# Patient Record
Sex: MALE | Race: WHITE | NOT HISPANIC OR LATINO | Employment: UNEMPLOYED | URBAN - METROPOLITAN AREA
[De-identification: names, ages, dates, MRNs, and addresses within clinical notes are randomized per-mention and may not be internally consistent; named-entity substitution may affect disease eponyms.]

---

## 2020-08-24 ENCOUNTER — OFFICE VISIT (OUTPATIENT)
Dept: INTERNAL MEDICINE | Facility: CLINIC | Age: 60
End: 2020-08-24
Payer: COMMERCIAL

## 2020-08-24 ENCOUNTER — HOSPITAL ENCOUNTER (OUTPATIENT)
Dept: CARDIOLOGY | Facility: HOSPITAL | Age: 60
Discharge: HOME | End: 2020-08-24
Attending: FAMILY MEDICINE
Payer: COMMERCIAL

## 2020-08-24 ENCOUNTER — HOSPITAL ENCOUNTER (OUTPATIENT)
Dept: RADIOLOGY | Facility: HOSPITAL | Age: 60
Discharge: HOME | End: 2020-08-24
Attending: FAMILY MEDICINE
Payer: COMMERCIAL

## 2020-08-24 VITALS
DIASTOLIC BLOOD PRESSURE: 86 MMHG | WEIGHT: 211.63 LBS | TEMPERATURE: 98 F | HEART RATE: 75 BPM | SYSTOLIC BLOOD PRESSURE: 132 MMHG | HEIGHT: 72 IN | RESPIRATION RATE: 18 BRPM | BODY MASS INDEX: 28.66 KG/M2 | OXYGEN SATURATION: 98 %

## 2020-08-24 DIAGNOSIS — I10 ESSENTIAL HYPERTENSION: ICD-10-CM

## 2020-08-24 DIAGNOSIS — F41.1 GAD (GENERALIZED ANXIETY DISORDER): Primary | ICD-10-CM

## 2020-08-24 DIAGNOSIS — Z00.00 ROUTINE GENERAL MEDICAL EXAMINATION AT HEALTH CARE FACILITY: ICD-10-CM

## 2020-08-24 DIAGNOSIS — Z11.4 ENCOUNTER FOR SCREENING FOR HIV: ICD-10-CM

## 2020-08-24 DIAGNOSIS — M79.89 SWELLING OF FINGER OF BOTH HANDS: ICD-10-CM

## 2020-08-24 PROCEDURE — 99204 PR OFFICE/OUTPT VISIT, NEW, LEVL IV, 45-59 MIN: ICD-10-PCS | Mod: S$GLB,,, | Performed by: FAMILY MEDICINE

## 2020-08-24 PROCEDURE — 93005 ELECTROCARDIOGRAM TRACING: CPT | Mod: PO

## 2020-08-24 PROCEDURE — 3008F PR BODY MASS INDEX (BMI) DOCUMENTED: ICD-10-PCS | Mod: CPTII,S$GLB,, | Performed by: FAMILY MEDICINE

## 2020-08-24 PROCEDURE — 71046 X-RAY EXAM CHEST 2 VIEWS: CPT | Mod: TC,PO

## 2020-08-24 PROCEDURE — 99999 PR PBB SHADOW E&M-NEW PATIENT-LVL IV: ICD-10-PCS | Mod: PBBFAC,,, | Performed by: FAMILY MEDICINE

## 2020-08-24 PROCEDURE — 71046 X-RAY EXAM CHEST 2 VIEWS: CPT | Mod: 26,,, | Performed by: RADIOLOGY

## 2020-08-24 PROCEDURE — 93010 EKG 12-LEAD: ICD-10-PCS | Mod: ,,, | Performed by: INTERNAL MEDICINE

## 2020-08-24 PROCEDURE — 3008F BODY MASS INDEX DOCD: CPT | Mod: CPTII,S$GLB,, | Performed by: FAMILY MEDICINE

## 2020-08-24 PROCEDURE — 93010 ELECTROCARDIOGRAM REPORT: CPT | Mod: ,,, | Performed by: INTERNAL MEDICINE

## 2020-08-24 PROCEDURE — 71046 XR CHEST PA AND LATERAL: ICD-10-PCS | Mod: 26,,, | Performed by: RADIOLOGY

## 2020-08-24 PROCEDURE — 99204 OFFICE O/P NEW MOD 45 MIN: CPT | Mod: S$GLB,,, | Performed by: FAMILY MEDICINE

## 2020-08-24 PROCEDURE — 99999 PR PBB SHADOW E&M-NEW PATIENT-LVL IV: CPT | Mod: PBBFAC,,, | Performed by: FAMILY MEDICINE

## 2020-08-24 RX ORDER — ASPIRIN 81 MG/1
81 TABLET ORAL DAILY
COMMUNITY

## 2020-08-24 RX ORDER — SERTRALINE HYDROCHLORIDE 25 MG/1
25 TABLET, FILM COATED ORAL DAILY
Qty: 30 TABLET | Refills: 6 | Status: SHIPPED | OUTPATIENT
Start: 2020-08-24 | End: 2020-09-22

## 2020-08-24 RX ORDER — LISINOPRIL 10 MG/1
10 TABLET ORAL DAILY
COMMUNITY
End: 2021-06-11 | Stop reason: SDUPTHER

## 2020-08-24 NOTE — PROGRESS NOTES
Patient ID: Aguilar Rowe is a 60 y.o. male.    Chief Complaint: Panic attacks, Hypertention, and Fingers swelling    HPI Patient is a 60-year-old male who presents with complaints of anxiety and panic attack that 1st began a couple months ago.  Says most attack was last week when he began having trouble breathing with chest tightness.  Also had excess sweating.  Was at home when this occurred. No identifiable triggers. Rode around town and felt better.  Has had 2 other episodes prior to this.  Does report having more responsibility at work as he was promoted and oversees other people.  Works at local [employer]. This does cause him a lot of stress and anxiety.  Has been taking Benadryl to help with sleep and symptoms.    Takes lisinopril for hypertension.  Does report that at work earlier today blood pressure was somewhat elevated.    Also reports b/l finger swelling that began about a month ago.  No joint pain or leg swelling. No erythema.  Does work outside in the heat.  He denies PND and orthopnea.  No chest pain. Denies tobacco use. No prior cardiac problems. Drinks 3-5 beers after work each day.    Does report that he has dyslexia and has trouble reading. Besides carpal tunnel denies any other medical problems.    Family History   Problem Relation Age of Onset    No Known Problems Mother     No Known Problems Father        Current Outpatient Medications:     aspirin (ECOTRIN) 81 MG EC tablet, Take 81 mg by mouth once daily., Disp: , Rfl:     lisinopriL 10 MG tablet, Take 10 mg by mouth once daily., Disp: , Rfl:     sertraline (ZOLOFT) 25 MG tablet, Take 1 tablet (25 mg total) by mouth once daily., Disp: 30 tablet, Rfl: 6    Review of Systems   Constitutional: Negative for chills and fever.   HENT: Negative for congestion and sore throat.    Eyes: Negative for visual disturbance.   Respiratory: Negative for cough and wheezing.    Cardiovascular: Negative for leg swelling.   Gastrointestinal: Negative  for abdominal pain, diarrhea and nausea.   Endocrine: Negative for polydipsia and polyuria.   Musculoskeletal: Positive for joint swelling. Negative for arthralgias.   Skin: Negative for rash.   Neurological: Negative for dizziness and headaches.   Psychiatric/Behavioral: Negative for dysphoric mood and sleep disturbance. The patient is nervous/anxious.        Objective:   /86 (BP Location: Left arm, Patient Position: Sitting, BP Method: Large (Manual))   Pulse 75   Temp 97.9 °F (36.6 °C) (Temporal)   Resp 18   Ht 6' (1.829 m)   Wt 96 kg (211 lb 10.3 oz)   SpO2 98%   BMI 28.70 kg/m²      Physical Exam  Constitutional:       Appearance: He is well-developed.   HENT:      Head: Normocephalic and atraumatic.   Eyes:      Conjunctiva/sclera: Conjunctivae normal.   Neck:      Musculoskeletal: Normal range of motion.   Cardiovascular:      Rate and Rhythm: Normal rate and regular rhythm.      Heart sounds: Normal heart sounds.   Pulmonary:      Effort: Pulmonary effort is normal.      Breath sounds: Normal breath sounds.   Abdominal:      Palpations: Abdomen is soft.   Musculoskeletal: Normal range of motion.      Comments: Mild bilateral finger swelling. Finger amputation. No other joint deformities   Skin:     General: Skin is warm.   Neurological:      Mental Status: He is alert and oriented to person, place, and time.   Psychiatric:         Mood and Affect: Mood normal.         Behavior: Behavior normal.         Thought Content: Thought content normal.         Judgment: Judgment normal.             Assessment & Plan     Problem List Items Addressed This Visit        Psychiatric    AYSHA (generalized anxiety disorder) - Primary    Relevant Medications    sertraline (ZOLOFT) 25 MG tablet    Other Relevant Orders    TSH (Completed)       Cardiac/Vascular    Essential hypertension    Current Assessment & Plan     -continue lisinopril         Relevant Orders    CBC auto differential (Completed)    Comprehensive  metabolic panel (Completed)    Hemoglobin A1C    Lipid Panel    X-Ray Chest PA And Lateral    EKG 12-lead       Other    Swelling of finger of both hands    Current Assessment & Plan     -No erythema or joint pain associated with finger swelling. Could be due to excess heat exposure at work.   -f/u labs           Other Visit Diagnoses     Encounter for screening for HIV        Routine general medical examination at health care facility        Relevant Orders    CBC auto differential (Completed)    Comprehensive metabolic panel (Completed)    Hemoglobin A1C    Lipid Panel    EKG 12-lead    Hepatitis C Antibody    HIV 1/2 Ag/Ab (4th Gen)    Zoster Recombinant Vaccine    Tdap Vaccine            Follow up in about 4 weeks (around 9/21/2020) for anxiety f/u.      Disclaimer:  This note may have been prepared using voice recognition software, without extensive proofreading. As such, there could be errors within the text such as sound alike errors.

## 2020-08-24 NOTE — ASSESSMENT & PLAN NOTE
-No erythema or joint pain associated with finger swelling. Could be due to excess heat exposure at work.   -f/u labs

## 2020-09-22 ENCOUNTER — OFFICE VISIT (OUTPATIENT)
Dept: INTERNAL MEDICINE | Facility: CLINIC | Age: 60
End: 2020-09-22
Payer: COMMERCIAL

## 2020-09-22 VITALS
HEIGHT: 72 IN | BODY MASS INDEX: 28.66 KG/M2 | TEMPERATURE: 98 F | RESPIRATION RATE: 20 BRPM | DIASTOLIC BLOOD PRESSURE: 84 MMHG | WEIGHT: 211.63 LBS | OXYGEN SATURATION: 98 % | SYSTOLIC BLOOD PRESSURE: 128 MMHG | HEART RATE: 73 BPM

## 2020-09-22 DIAGNOSIS — F10.29 ALCOHOL DEPENDENCE WITH UNSPECIFIED ALCOHOL-INDUCED DISORDER: ICD-10-CM

## 2020-09-22 DIAGNOSIS — I10 ESSENTIAL HYPERTENSION: ICD-10-CM

## 2020-09-22 DIAGNOSIS — F41.1 GAD (GENERALIZED ANXIETY DISORDER): Primary | ICD-10-CM

## 2020-09-22 PROBLEM — F10.20 ALCOHOL DEPENDENCE: Status: ACTIVE | Noted: 2020-09-22

## 2020-09-22 PROCEDURE — 3008F PR BODY MASS INDEX (BMI) DOCUMENTED: ICD-10-PCS | Mod: CPTII,S$GLB,, | Performed by: FAMILY MEDICINE

## 2020-09-22 PROCEDURE — 99214 OFFICE O/P EST MOD 30 MIN: CPT | Mod: 25,S$GLB,, | Performed by: FAMILY MEDICINE

## 2020-09-22 PROCEDURE — 90471 IMMUNIZATION ADMIN: CPT | Mod: S$GLB,,, | Performed by: FAMILY MEDICINE

## 2020-09-22 PROCEDURE — 99999 PR PBB SHADOW E&M-EST. PATIENT-LVL III: CPT | Mod: PBBFAC,,, | Performed by: FAMILY MEDICINE

## 2020-09-22 PROCEDURE — 99214 PR OFFICE/OUTPT VISIT, EST, LEVL IV, 30-39 MIN: ICD-10-PCS | Mod: 25,S$GLB,, | Performed by: FAMILY MEDICINE

## 2020-09-22 PROCEDURE — 99999 PR PBB SHADOW E&M-EST. PATIENT-LVL III: ICD-10-PCS | Mod: PBBFAC,,, | Performed by: FAMILY MEDICINE

## 2020-09-22 PROCEDURE — 90471 FLU VACCINE (QUAD) GREATER THAN OR EQUAL TO 3YO PRESERVATIVE FREE IM: ICD-10-PCS | Mod: S$GLB,,, | Performed by: FAMILY MEDICINE

## 2020-09-22 PROCEDURE — 3008F BODY MASS INDEX DOCD: CPT | Mod: CPTII,S$GLB,, | Performed by: FAMILY MEDICINE

## 2020-09-22 PROCEDURE — 90686 FLU VACCINE (QUAD) GREATER THAN OR EQUAL TO 3YO PRESERVATIVE FREE IM: ICD-10-PCS | Mod: S$GLB,,, | Performed by: FAMILY MEDICINE

## 2020-09-22 PROCEDURE — 90686 IIV4 VACC NO PRSV 0.5 ML IM: CPT | Mod: S$GLB,,, | Performed by: FAMILY MEDICINE

## 2020-09-22 NOTE — ASSESSMENT & PLAN NOTE
-we discussed most recent labs including elevated T bilirubin at 1.2.  Will continue to monitor.    -He has decreased beer intake from 18 daily to 12. I congratulated him. Offered resources to assist however he declined

## 2020-09-22 NOTE — ASSESSMENT & PLAN NOTE
-discussed prescribing new medication for anxiety.  Would like to hold off for now.  Says anxiety is better controlled since decreasing alcohol intake.  Encouraged his efforts and told him that resources/medications are available if needed

## 2020-09-22 NOTE — PROGRESS NOTES
" Patient ID: Aguilar Rowe is a 60 y.o. male.    Chief Complaint: Follow-up anxiety (patient d/c zoloft made him feel strange)    HPI Patient is 60-year-old male who presents for anxiety follow-up.  Zoloft 25 mg started at last visit.  Says he took medication for 1 week then discontinued because it made him feel "strange".  Says anxiety has been better controlled since last visit. No panic attacks. Takes benadryl at night for sleep.    Has decreased alcohol intake (18 beers--> 12 beers per day).  Plans to continue decrease.  Says alcohol helps him relax at end of day.  Says hand/finger swelling resolved after decreasing alcohol intake.    Last c-scope 12/2017.  Says scope was normal and was told to repeat in 10 years.    Family History   Problem Relation Age of Onset    No Known Problems Mother     No Known Problems Father        Current Outpatient Medications:     aspirin (ECOTRIN) 81 MG EC tablet, Take 81 mg by mouth once daily., Disp: , Rfl:     lisinopriL 10 MG tablet, Take 10 mg by mouth once daily., Disp: , Rfl:     Review of Systems   Constitutional: Negative for chills and fever.   HENT: Negative for congestion and sore throat.    Eyes: Negative for visual disturbance.   Respiratory: Negative for cough, shortness of breath and wheezing.    Cardiovascular: Negative for chest pain.   Gastrointestinal: Negative for abdominal pain, diarrhea and nausea.   Endocrine: Negative for polydipsia and polyuria.   Musculoskeletal: Negative for arthralgias.   Skin: Negative for rash.   Neurological: Negative for dizziness and headaches.   Psychiatric/Behavioral: The patient is not nervous/anxious.        Objective:   /84 (BP Location: Left arm, Patient Position: Sitting, BP Method: Large (Manual))   Pulse 73   Temp 97.9 °F (36.6 °C) (Temporal)   Resp 20   Ht 6' (1.829 m)   Wt 96 kg (211 lb 10.3 oz)   SpO2 98%   BMI 28.70 kg/m²      Physical Exam  Constitutional:       Appearance: He is well-developed. "   HENT:      Head: Normocephalic and atraumatic.   Eyes:      Conjunctiva/sclera: Conjunctivae normal.   Neck:      Musculoskeletal: Normal range of motion.   Cardiovascular:      Rate and Rhythm: Normal rate and regular rhythm.      Heart sounds: Normal heart sounds.   Pulmonary:      Effort: Pulmonary effort is normal.      Breath sounds: Normal breath sounds.   Abdominal:      Palpations: Abdomen is soft.      Tenderness: There is no abdominal tenderness.   Musculoskeletal: Normal range of motion.      Right lower leg: No edema.      Left lower leg: No edema.   Skin:     General: Skin is warm.   Neurological:      Mental Status: He is alert and oriented to person, place, and time.   Psychiatric:         Mood and Affect: Mood normal.         Behavior: Behavior normal.         Thought Content: Thought content normal.         Judgment: Judgment normal.         Assessment & Plan     Problem List Items Addressed This Visit        Psychiatric    AYSHA (generalized anxiety disorder) - Primary    Current Assessment & Plan     -discussed prescribing new medication for anxiety.  Would like to hold off for now.  Says anxiety is better controlled since decreasing alcohol intake.  Encouraged his efforts and told him that resources/medications are available if needed         Alcohol dependence    Current Assessment & Plan     -we discussed most recent labs including elevated T bilirubin at 1.2.  Will continue to monitor.    -He has decreased beer intake from 18 daily to 12. I congratulated him. Offered resources to assist however he declined            Cardiac/Vascular    Essential hypertension    Current Assessment & Plan     -controlled.  Continue lisinopril.             Discussed lab results. Declined shingles and tetanus vaccines. Flu shot today    Disclaimer:  This note may have been prepared using voice recognition software, without extensive proofreading. As such, there could be errors within the text such as sound alike  errors.

## 2021-02-22 ENCOUNTER — OFFICE VISIT (OUTPATIENT)
Dept: ORTHOPEDICS | Facility: CLINIC | Age: 61
End: 2021-02-22
Payer: COMMERCIAL

## 2021-02-22 ENCOUNTER — OFFICE VISIT (OUTPATIENT)
Dept: INTERNAL MEDICINE | Facility: CLINIC | Age: 61
End: 2021-02-22
Payer: COMMERCIAL

## 2021-02-22 VITALS — HEIGHT: 72 IN | BODY MASS INDEX: 29.12 KG/M2 | WEIGHT: 215 LBS

## 2021-02-22 VITALS
BODY MASS INDEX: 29.21 KG/M2 | SYSTOLIC BLOOD PRESSURE: 130 MMHG | DIASTOLIC BLOOD PRESSURE: 70 MMHG | OXYGEN SATURATION: 98 % | HEIGHT: 72 IN | WEIGHT: 215.63 LBS | TEMPERATURE: 98 F | HEART RATE: 72 BPM

## 2021-02-22 DIAGNOSIS — M65.341 TRIGGER FINGER, RIGHT RING FINGER: ICD-10-CM

## 2021-02-22 DIAGNOSIS — M79.641 PAIN OF RIGHT HAND: Primary | ICD-10-CM

## 2021-02-22 DIAGNOSIS — I10 ESSENTIAL HYPERTENSION: ICD-10-CM

## 2021-02-22 DIAGNOSIS — Z12.11 SPECIAL SCREENING FOR MALIGNANT NEOPLASM OF COLON: Primary | ICD-10-CM

## 2021-02-22 DIAGNOSIS — F41.1 GAD (GENERALIZED ANXIETY DISORDER): ICD-10-CM

## 2021-02-22 DIAGNOSIS — G56.01 RIGHT CARPAL TUNNEL SYNDROME: ICD-10-CM

## 2021-02-22 DIAGNOSIS — G56.01 CARPAL TUNNEL SYNDROME OF RIGHT WRIST: Primary | ICD-10-CM

## 2021-02-22 DIAGNOSIS — M79.641 PAIN OF RIGHT HAND: ICD-10-CM

## 2021-02-22 PROCEDURE — 1125F AMNT PAIN NOTED PAIN PRSNT: CPT | Mod: S$GLB,,, | Performed by: STUDENT IN AN ORGANIZED HEALTH CARE EDUCATION/TRAINING PROGRAM

## 2021-02-22 PROCEDURE — 99999 PR PBB SHADOW E&M-EST. PATIENT-LVL III: CPT | Mod: PBBFAC,,, | Performed by: STUDENT IN AN ORGANIZED HEALTH CARE EDUCATION/TRAINING PROGRAM

## 2021-02-22 PROCEDURE — 3008F BODY MASS INDEX DOCD: CPT | Mod: CPTII,S$GLB,, | Performed by: STUDENT IN AN ORGANIZED HEALTH CARE EDUCATION/TRAINING PROGRAM

## 2021-02-22 PROCEDURE — 1125F PR PAIN SEVERITY QUANTIFIED, PAIN PRESENT: ICD-10-PCS | Mod: S$GLB,,, | Performed by: NURSE PRACTITIONER

## 2021-02-22 PROCEDURE — 3008F PR BODY MASS INDEX (BMI) DOCUMENTED: ICD-10-PCS | Mod: CPTII,S$GLB,, | Performed by: NURSE PRACTITIONER

## 2021-02-22 PROCEDURE — 99214 OFFICE O/P EST MOD 30 MIN: CPT | Mod: S$GLB,,, | Performed by: NURSE PRACTITIONER

## 2021-02-22 PROCEDURE — 3078F DIAST BP <80 MM HG: CPT | Mod: CPTII,S$GLB,, | Performed by: NURSE PRACTITIONER

## 2021-02-22 PROCEDURE — 99203 OFFICE O/P NEW LOW 30 MIN: CPT | Mod: S$GLB,,, | Performed by: STUDENT IN AN ORGANIZED HEALTH CARE EDUCATION/TRAINING PROGRAM

## 2021-02-22 PROCEDURE — 3008F BODY MASS INDEX DOCD: CPT | Mod: CPTII,S$GLB,, | Performed by: NURSE PRACTITIONER

## 2021-02-22 PROCEDURE — 99999 PR PBB SHADOW E&M-EST. PATIENT-LVL III: ICD-10-PCS | Mod: PBBFAC,,, | Performed by: STUDENT IN AN ORGANIZED HEALTH CARE EDUCATION/TRAINING PROGRAM

## 2021-02-22 PROCEDURE — 99999 PR PBB SHADOW E&M-EST. PATIENT-LVL IV: ICD-10-PCS | Mod: PBBFAC,,, | Performed by: NURSE PRACTITIONER

## 2021-02-22 PROCEDURE — 3075F SYST BP GE 130 - 139MM HG: CPT | Mod: CPTII,S$GLB,, | Performed by: NURSE PRACTITIONER

## 2021-02-22 PROCEDURE — 3078F PR MOST RECENT DIASTOLIC BLOOD PRESSURE < 80 MM HG: ICD-10-PCS | Mod: CPTII,S$GLB,, | Performed by: NURSE PRACTITIONER

## 2021-02-22 PROCEDURE — 99999 PR PBB SHADOW E&M-EST. PATIENT-LVL IV: CPT | Mod: PBBFAC,,, | Performed by: NURSE PRACTITIONER

## 2021-02-22 PROCEDURE — 99214 PR OFFICE/OUTPT VISIT, EST, LEVL IV, 30-39 MIN: ICD-10-PCS | Mod: S$GLB,,, | Performed by: NURSE PRACTITIONER

## 2021-02-22 PROCEDURE — 1125F PR PAIN SEVERITY QUANTIFIED, PAIN PRESENT: ICD-10-PCS | Mod: S$GLB,,, | Performed by: STUDENT IN AN ORGANIZED HEALTH CARE EDUCATION/TRAINING PROGRAM

## 2021-02-22 PROCEDURE — 3075F PR MOST RECENT SYSTOLIC BLOOD PRESS GE 130-139MM HG: ICD-10-PCS | Mod: CPTII,S$GLB,, | Performed by: NURSE PRACTITIONER

## 2021-02-22 PROCEDURE — 1125F AMNT PAIN NOTED PAIN PRSNT: CPT | Mod: S$GLB,,, | Performed by: NURSE PRACTITIONER

## 2021-02-22 PROCEDURE — 99203 PR OFFICE/OUTPT VISIT, NEW, LEVL III, 30-44 MIN: ICD-10-PCS | Mod: S$GLB,,, | Performed by: STUDENT IN AN ORGANIZED HEALTH CARE EDUCATION/TRAINING PROGRAM

## 2021-02-22 PROCEDURE — 3008F PR BODY MASS INDEX (BMI) DOCUMENTED: ICD-10-PCS | Mod: CPTII,S$GLB,, | Performed by: STUDENT IN AN ORGANIZED HEALTH CARE EDUCATION/TRAINING PROGRAM

## 2021-02-22 RX ORDER — HYDROXYZINE HYDROCHLORIDE 25 MG/1
25 TABLET, FILM COATED ORAL 3 TIMES DAILY PRN
Qty: 30 TABLET | Refills: 5 | Status: SHIPPED | OUTPATIENT
Start: 2021-02-22 | End: 2021-06-11

## 2021-02-22 RX ORDER — ESCITALOPRAM OXALATE 10 MG/1
5 TABLET ORAL DAILY
Qty: 30 TABLET | Refills: 0 | Status: SHIPPED | OUTPATIENT
Start: 2021-02-22 | End: 2021-06-11

## 2021-02-22 RX ORDER — MELOXICAM 7.5 MG/1
7.5 TABLET ORAL DAILY
Qty: 30 TABLET | Refills: 1 | Status: SHIPPED | OUTPATIENT
Start: 2021-02-22 | End: 2021-06-11

## 2021-03-22 ENCOUNTER — HOSPITAL ENCOUNTER (OUTPATIENT)
Dept: RADIOLOGY | Facility: HOSPITAL | Age: 61
Discharge: HOME | End: 2021-03-22
Attending: NURSE PRACTITIONER
Payer: COMMERCIAL

## 2021-03-22 ENCOUNTER — OFFICE VISIT (OUTPATIENT)
Dept: ORTHOPEDICS | Facility: CLINIC | Age: 61
End: 2021-03-22
Payer: COMMERCIAL

## 2021-03-22 VITALS — HEIGHT: 72 IN | WEIGHT: 215 LBS | BODY MASS INDEX: 29.12 KG/M2

## 2021-03-22 DIAGNOSIS — M65.331 TRIGGER FINGER, RIGHT MIDDLE FINGER: ICD-10-CM

## 2021-03-22 DIAGNOSIS — M79.641 PAIN OF RIGHT HAND: Primary | ICD-10-CM

## 2021-03-22 DIAGNOSIS — M25.331 SCAPHOLUNATE DISSOCIATION OF RIGHT WRIST: ICD-10-CM

## 2021-03-22 DIAGNOSIS — M65.341 TRIGGER FINGER, RIGHT RING FINGER: ICD-10-CM

## 2021-03-22 DIAGNOSIS — Z12.11 SPECIAL SCREENING FOR MALIGNANT NEOPLASM OF COLON: Primary | ICD-10-CM

## 2021-03-22 PROCEDURE — 20550 NJX 1 TENDON SHEATH/LIGAMENT: CPT | Mod: 51,F8,S$GLB, | Performed by: STUDENT IN AN ORGANIZED HEALTH CARE EDUCATION/TRAINING PROGRAM

## 2021-03-22 PROCEDURE — 99999 PR PBB SHADOW E&M-EST. PATIENT-LVL III: ICD-10-PCS | Mod: PBBFAC,,, | Performed by: STUDENT IN AN ORGANIZED HEALTH CARE EDUCATION/TRAINING PROGRAM

## 2021-03-22 PROCEDURE — 73130 X-RAY EXAM OF HAND: CPT | Mod: 26,RT,, | Performed by: RADIOLOGY

## 2021-03-22 PROCEDURE — 99999 PR PBB SHADOW E&M-EST. PATIENT-LVL III: CPT | Mod: PBBFAC,,, | Performed by: STUDENT IN AN ORGANIZED HEALTH CARE EDUCATION/TRAINING PROGRAM

## 2021-03-22 PROCEDURE — 99215 OFFICE O/P EST HI 40 MIN: CPT | Mod: 25,S$GLB,, | Performed by: STUDENT IN AN ORGANIZED HEALTH CARE EDUCATION/TRAINING PROGRAM

## 2021-03-22 PROCEDURE — 73130 XR HAND COMPLETE 3 VIEW RIGHT: ICD-10-PCS | Mod: 26,RT,, | Performed by: RADIOLOGY

## 2021-03-22 PROCEDURE — 20550 PR INJECT TENDON SHEATH/LIGAMENT: ICD-10-PCS | Mod: 51,F8,S$GLB, | Performed by: STUDENT IN AN ORGANIZED HEALTH CARE EDUCATION/TRAINING PROGRAM

## 2021-03-22 PROCEDURE — 20550 NJX 1 TENDON SHEATH/LIGAMENT: CPT | Mod: PBBFAC,RT | Performed by: STUDENT IN AN ORGANIZED HEALTH CARE EDUCATION/TRAINING PROGRAM

## 2021-03-22 PROCEDURE — 99215 PR OFFICE/OUTPT VISIT, EST, LEVL V, 40-54 MIN: ICD-10-PCS | Mod: 25,S$GLB,, | Performed by: STUDENT IN AN ORGANIZED HEALTH CARE EDUCATION/TRAINING PROGRAM

## 2021-03-22 PROCEDURE — 73130 X-RAY EXAM OF HAND: CPT | Mod: TC,RT

## 2021-03-22 RX ORDER — LIDOCAINE HYDROCHLORIDE 10 MG/ML
1 INJECTION INFILTRATION; PERINEURAL
Status: DISCONTINUED | OUTPATIENT
Start: 2021-03-22 | End: 2021-03-22 | Stop reason: HOSPADM

## 2021-03-22 RX ORDER — BETAMETHASONE SODIUM PHOSPHATE AND BETAMETHASONE ACETATE 3; 3 MG/ML; MG/ML
3 INJECTION, SUSPENSION INTRA-ARTICULAR; INTRALESIONAL; INTRAMUSCULAR; SOFT TISSUE
Status: DISCONTINUED | OUTPATIENT
Start: 2021-03-22 | End: 2021-03-22 | Stop reason: HOSPADM

## 2021-03-22 RX ADMIN — LIDOCAINE HYDROCHLORIDE 1 ML: 10 INJECTION INFILTRATION; PERINEURAL at 01:03

## 2021-03-22 RX ADMIN — BETAMETHASONE SODIUM PHOSPHATE AND BETAMETHASONE ACETATE 3 MG: 3; 3 INJECTION, SUSPENSION INTRA-ARTICULAR; INTRALESIONAL; INTRAMUSCULAR; SOFT TISSUE at 01:03

## 2021-03-22 RX ADMIN — BETAMETHASONE ACETATE AND BETAMETHASONE SODIUM PHOSPHATE 3 MG: 3; 3 INJECTION, SUSPENSION INTRA-ARTICULAR; INTRALESIONAL; INTRAMUSCULAR; SOFT TISSUE at 01:03

## 2021-03-23 ENCOUNTER — TELEPHONE (OUTPATIENT)
Dept: ENDOSCOPY | Facility: HOSPITAL | Age: 61
End: 2021-03-23

## 2021-04-07 ENCOUNTER — OFFICE VISIT (OUTPATIENT)
Dept: ORTHOPEDICS | Facility: CLINIC | Age: 61
End: 2021-04-07
Payer: COMMERCIAL

## 2021-04-07 VITALS
DIASTOLIC BLOOD PRESSURE: 88 MMHG | SYSTOLIC BLOOD PRESSURE: 132 MMHG | BODY MASS INDEX: 29.12 KG/M2 | HEIGHT: 72 IN | HEART RATE: 75 BPM | WEIGHT: 215 LBS

## 2021-04-07 DIAGNOSIS — M65.341 TRIGGER FINGER, RIGHT RING FINGER: Primary | ICD-10-CM

## 2021-04-07 DIAGNOSIS — G56.01 CARPAL TUNNEL SYNDROME OF RIGHT WRIST: ICD-10-CM

## 2021-04-07 PROCEDURE — 99999 PR PBB SHADOW E&M-EST. PATIENT-LVL III: CPT | Mod: PBBFAC,,, | Performed by: ORTHOPAEDIC SURGERY

## 2021-04-07 PROCEDURE — 99213 PR OFFICE/OUTPT VISIT, EST, LEVL III, 20-29 MIN: ICD-10-PCS | Mod: S$PBB,,, | Performed by: ORTHOPAEDIC SURGERY

## 2021-04-07 PROCEDURE — 99999 PR PBB SHADOW E&M-EST. PATIENT-LVL III: ICD-10-PCS | Mod: PBBFAC,,, | Performed by: ORTHOPAEDIC SURGERY

## 2021-04-07 PROCEDURE — 99213 OFFICE O/P EST LOW 20 MIN: CPT | Mod: S$PBB,,, | Performed by: ORTHOPAEDIC SURGERY

## 2021-06-11 ENCOUNTER — LAB VISIT (OUTPATIENT)
Dept: LAB | Facility: HOSPITAL | Age: 61
End: 2021-06-11
Attending: NURSE PRACTITIONER
Payer: COMMERCIAL

## 2021-06-11 ENCOUNTER — OFFICE VISIT (OUTPATIENT)
Dept: INTERNAL MEDICINE | Facility: CLINIC | Age: 61
End: 2021-06-11
Payer: COMMERCIAL

## 2021-06-11 VITALS
WEIGHT: 212.75 LBS | TEMPERATURE: 99 F | OXYGEN SATURATION: 96 % | HEART RATE: 82 BPM | DIASTOLIC BLOOD PRESSURE: 88 MMHG | BODY MASS INDEX: 28.82 KG/M2 | HEIGHT: 72 IN | SYSTOLIC BLOOD PRESSURE: 136 MMHG

## 2021-06-11 DIAGNOSIS — Z12.5 PROSTATE CANCER SCREENING: ICD-10-CM

## 2021-06-11 DIAGNOSIS — R55 NEAR SYNCOPE: ICD-10-CM

## 2021-06-11 DIAGNOSIS — I10 ESSENTIAL HYPERTENSION: Primary | ICD-10-CM

## 2021-06-11 DIAGNOSIS — I10 ESSENTIAL HYPERTENSION: ICD-10-CM

## 2021-06-11 PROBLEM — K44.9 HIATAL HERNIA: Status: ACTIVE | Noted: 2018-04-16

## 2021-06-11 LAB
ALBUMIN SERPL BCP-MCNC: 4 G/DL (ref 3.5–5.2)
ALP SERPL-CCNC: 57 U/L (ref 55–135)
ALT SERPL W/O P-5'-P-CCNC: 32 U/L (ref 10–44)
ANION GAP SERPL CALC-SCNC: 12 MMOL/L (ref 8–16)
AST SERPL-CCNC: 26 U/L (ref 10–40)
BASOPHILS # BLD AUTO: 0.04 K/UL (ref 0–0.2)
BASOPHILS NFR BLD: 0.5 % (ref 0–1.9)
BILIRUB SERPL-MCNC: 1.1 MG/DL (ref 0.1–1)
BUN SERPL-MCNC: 11 MG/DL (ref 6–20)
CALCIUM SERPL-MCNC: 8.7 MG/DL (ref 8.7–10.5)
CHLORIDE SERPL-SCNC: 108 MMOL/L (ref 95–110)
CO2 SERPL-SCNC: 21 MMOL/L (ref 23–29)
CREAT SERPL-MCNC: 1.1 MG/DL (ref 0.5–1.4)
DIFFERENTIAL METHOD: ABNORMAL
EOSINOPHIL # BLD AUTO: 0 K/UL (ref 0–0.5)
EOSINOPHIL NFR BLD: 0.4 % (ref 0–8)
ERYTHROCYTE [DISTWIDTH] IN BLOOD BY AUTOMATED COUNT: 12 % (ref 11.5–14.5)
EST. GFR  (AFRICAN AMERICAN): >60 ML/MIN/1.73 M^2
EST. GFR  (NON AFRICAN AMERICAN): >60 ML/MIN/1.73 M^2
GLUCOSE SERPL-MCNC: 95 MG/DL (ref 70–110)
HCT VFR BLD AUTO: 40.1 % (ref 40–54)
HGB BLD-MCNC: 14.3 G/DL (ref 14–18)
IMM GRANULOCYTES # BLD AUTO: 0.03 K/UL (ref 0–0.04)
IMM GRANULOCYTES NFR BLD AUTO: 0.4 % (ref 0–0.5)
LYMPHOCYTES # BLD AUTO: 2 K/UL (ref 1–4.8)
LYMPHOCYTES NFR BLD: 25.7 % (ref 18–48)
MCH RBC QN AUTO: 34.3 PG (ref 27–31)
MCHC RBC AUTO-ENTMCNC: 35.7 G/DL (ref 32–36)
MCV RBC AUTO: 96 FL (ref 82–98)
MONOCYTES # BLD AUTO: 0.8 K/UL (ref 0.3–1)
MONOCYTES NFR BLD: 9.9 % (ref 4–15)
NEUTROPHILS # BLD AUTO: 4.8 K/UL (ref 1.8–7.7)
NEUTROPHILS NFR BLD: 63.1 % (ref 38–73)
NRBC BLD-RTO: 0 /100 WBC
PLATELET # BLD AUTO: 216 K/UL (ref 150–450)
PMV BLD AUTO: 9.8 FL (ref 9.2–12.9)
POTASSIUM SERPL-SCNC: 3.7 MMOL/L (ref 3.5–5.1)
PROT SERPL-MCNC: 7.1 G/DL (ref 6–8.4)
RBC # BLD AUTO: 4.17 M/UL (ref 4.6–6.2)
SODIUM SERPL-SCNC: 141 MMOL/L (ref 136–145)
WBC # BLD AUTO: 7.58 K/UL (ref 3.9–12.7)

## 2021-06-11 PROCEDURE — 3008F BODY MASS INDEX DOCD: CPT | Mod: CPTII,S$GLB,, | Performed by: NURSE PRACTITIONER

## 2021-06-11 PROCEDURE — 80053 COMPREHEN METABOLIC PANEL: CPT | Mod: PO | Performed by: NURSE PRACTITIONER

## 2021-06-11 PROCEDURE — 1126F PR PAIN SEVERITY QUANTIFIED, NO PAIN PRESENT: ICD-10-PCS | Mod: S$GLB,,, | Performed by: NURSE PRACTITIONER

## 2021-06-11 PROCEDURE — 99999 PR PBB SHADOW E&M-EST. PATIENT-LVL III: ICD-10-PCS | Mod: PBBFAC,,, | Performed by: NURSE PRACTITIONER

## 2021-06-11 PROCEDURE — 99214 OFFICE O/P EST MOD 30 MIN: CPT | Mod: S$GLB,,, | Performed by: NURSE PRACTITIONER

## 2021-06-11 PROCEDURE — 3075F SYST BP GE 130 - 139MM HG: CPT | Mod: CPTII,S$GLB,, | Performed by: NURSE PRACTITIONER

## 2021-06-11 PROCEDURE — 1126F AMNT PAIN NOTED NONE PRSNT: CPT | Mod: S$GLB,,, | Performed by: NURSE PRACTITIONER

## 2021-06-11 PROCEDURE — 3079F PR MOST RECENT DIASTOLIC BLOOD PRESSURE 80-89 MM HG: ICD-10-PCS | Mod: CPTII,S$GLB,, | Performed by: NURSE PRACTITIONER

## 2021-06-11 PROCEDURE — 84153 ASSAY OF PSA TOTAL: CPT | Performed by: NURSE PRACTITIONER

## 2021-06-11 PROCEDURE — 99214 PR OFFICE/OUTPT VISIT, EST, LEVL IV, 30-39 MIN: ICD-10-PCS | Mod: S$GLB,,, | Performed by: NURSE PRACTITIONER

## 2021-06-11 PROCEDURE — 3008F PR BODY MASS INDEX (BMI) DOCUMENTED: ICD-10-PCS | Mod: CPTII,S$GLB,, | Performed by: NURSE PRACTITIONER

## 2021-06-11 PROCEDURE — 99999 PR PBB SHADOW E&M-EST. PATIENT-LVL III: CPT | Mod: PBBFAC,,, | Performed by: NURSE PRACTITIONER

## 2021-06-11 PROCEDURE — 3079F DIAST BP 80-89 MM HG: CPT | Mod: CPTII,S$GLB,, | Performed by: NURSE PRACTITIONER

## 2021-06-11 PROCEDURE — 36415 COLL VENOUS BLD VENIPUNCTURE: CPT | Mod: PO | Performed by: NURSE PRACTITIONER

## 2021-06-11 PROCEDURE — 85025 COMPLETE CBC W/AUTO DIFF WBC: CPT | Mod: PO | Performed by: NURSE PRACTITIONER

## 2021-06-11 PROCEDURE — 3075F PR MOST RECENT SYSTOLIC BLOOD PRESS GE 130-139MM HG: ICD-10-PCS | Mod: CPTII,S$GLB,, | Performed by: NURSE PRACTITIONER

## 2021-06-11 RX ORDER — LISINOPRIL 10 MG/1
10 TABLET ORAL DAILY
Qty: 90 TABLET | Refills: 1 | Status: SHIPPED | OUTPATIENT
Start: 2021-06-11 | End: 2021-11-29

## 2021-06-12 LAB — COMPLEXED PSA SERPL-MCNC: 0.26 NG/ML (ref 0–4)

## 2021-06-30 DIAGNOSIS — M79.641 RIGHT HAND PAIN: Primary | ICD-10-CM

## 2021-07-06 ENCOUNTER — TELEPHONE (OUTPATIENT)
Dept: ORTHOPEDICS | Facility: CLINIC | Age: 61
End: 2021-07-06

## 2021-07-20 ENCOUNTER — PATIENT OUTREACH (OUTPATIENT)
Dept: ADMINISTRATIVE | Facility: OTHER | Age: 61
End: 2021-07-20

## 2021-07-21 ENCOUNTER — HOSPITAL ENCOUNTER (OUTPATIENT)
Dept: RADIOLOGY | Facility: HOSPITAL | Age: 61
Discharge: HOME | End: 2021-07-21
Attending: ORTHOPAEDIC SURGERY
Payer: COMMERCIAL

## 2021-07-21 ENCOUNTER — OFFICE VISIT (OUTPATIENT)
Dept: ORTHOPEDICS | Facility: CLINIC | Age: 61
End: 2021-07-21
Payer: COMMERCIAL

## 2021-07-21 VITALS
HEART RATE: 79 BPM | HEIGHT: 72 IN | WEIGHT: 212 LBS | BODY MASS INDEX: 28.71 KG/M2 | SYSTOLIC BLOOD PRESSURE: 115 MMHG | DIASTOLIC BLOOD PRESSURE: 78 MMHG

## 2021-07-21 DIAGNOSIS — M65.30 TRIGGER FINGER, ACQUIRED: Primary | ICD-10-CM

## 2021-07-21 DIAGNOSIS — Z01.818 PREOP TESTING: Primary | ICD-10-CM

## 2021-07-21 DIAGNOSIS — G56.01 CARPAL TUNNEL SYNDROME OF RIGHT WRIST: ICD-10-CM

## 2021-07-21 DIAGNOSIS — M79.641 RIGHT HAND PAIN: ICD-10-CM

## 2021-07-21 PROCEDURE — 3074F PR MOST RECENT SYSTOLIC BLOOD PRESSURE < 130 MM HG: ICD-10-PCS | Mod: CPTII,S$GLB,, | Performed by: ORTHOPAEDIC SURGERY

## 2021-07-21 PROCEDURE — 3008F PR BODY MASS INDEX (BMI) DOCUMENTED: ICD-10-PCS | Mod: CPTII,S$GLB,, | Performed by: ORTHOPAEDIC SURGERY

## 2021-07-21 PROCEDURE — 73130 X-RAY EXAM OF HAND: CPT | Mod: TC,RT

## 2021-07-21 PROCEDURE — 99999 PR PBB SHADOW E&M-EST. PATIENT-LVL III: ICD-10-PCS | Mod: PBBFAC,,, | Performed by: ORTHOPAEDIC SURGERY

## 2021-07-21 PROCEDURE — 3078F DIAST BP <80 MM HG: CPT | Mod: CPTII,S$GLB,, | Performed by: ORTHOPAEDIC SURGERY

## 2021-07-21 PROCEDURE — 3078F PR MOST RECENT DIASTOLIC BLOOD PRESSURE < 80 MM HG: ICD-10-PCS | Mod: CPTII,S$GLB,, | Performed by: ORTHOPAEDIC SURGERY

## 2021-07-21 PROCEDURE — 99213 OFFICE O/P EST LOW 20 MIN: CPT | Mod: S$GLB,,, | Performed by: ORTHOPAEDIC SURGERY

## 2021-07-21 PROCEDURE — 73130 X-RAY EXAM OF HAND: CPT | Mod: 26,RT,, | Performed by: RADIOLOGY

## 2021-07-21 PROCEDURE — 1125F AMNT PAIN NOTED PAIN PRSNT: CPT | Mod: CPTII,S$GLB,, | Performed by: ORTHOPAEDIC SURGERY

## 2021-07-21 PROCEDURE — 73130 XR HAND COMPLETE 3 VIEW RIGHT: ICD-10-PCS | Mod: 26,RT,, | Performed by: RADIOLOGY

## 2021-07-21 PROCEDURE — 99213 PR OFFICE/OUTPT VISIT, EST, LEVL III, 20-29 MIN: ICD-10-PCS | Mod: S$GLB,,, | Performed by: ORTHOPAEDIC SURGERY

## 2021-07-21 PROCEDURE — 3008F BODY MASS INDEX DOCD: CPT | Mod: CPTII,S$GLB,, | Performed by: ORTHOPAEDIC SURGERY

## 2021-07-21 PROCEDURE — 1125F PR PAIN SEVERITY QUANTIFIED, PAIN PRESENT: ICD-10-PCS | Mod: CPTII,S$GLB,, | Performed by: ORTHOPAEDIC SURGERY

## 2021-07-21 PROCEDURE — 3074F SYST BP LT 130 MM HG: CPT | Mod: CPTII,S$GLB,, | Performed by: ORTHOPAEDIC SURGERY

## 2021-07-21 PROCEDURE — 99999 PR PBB SHADOW E&M-EST. PATIENT-LVL III: CPT | Mod: PBBFAC,,, | Performed by: ORTHOPAEDIC SURGERY

## 2021-07-22 DIAGNOSIS — G56.01 CARPAL TUNNEL SYNDROME OF RIGHT WRIST: ICD-10-CM

## 2021-07-22 DIAGNOSIS — M65.30 TRIGGER FINGER, ACQUIRED: Primary | ICD-10-CM

## 2021-07-28 ENCOUNTER — HOSPITAL ENCOUNTER (OUTPATIENT)
Dept: RADIOLOGY | Facility: HOSPITAL | Age: 61
Discharge: HOME | End: 2021-07-28
Attending: ORTHOPAEDIC SURGERY
Payer: COMMERCIAL

## 2021-07-28 ENCOUNTER — HOSPITAL ENCOUNTER (OUTPATIENT)
Dept: CARDIOLOGY | Facility: HOSPITAL | Age: 61
Discharge: HOME | End: 2021-07-28
Attending: ORTHOPAEDIC SURGERY
Payer: COMMERCIAL

## 2021-07-28 DIAGNOSIS — Z01.818 PREOP TESTING: ICD-10-CM

## 2021-07-28 PROCEDURE — 93010 ELECTROCARDIOGRAM REPORT: CPT | Mod: ,,, | Performed by: INTERNAL MEDICINE

## 2021-07-28 PROCEDURE — 71046 XR CHEST PA AND LATERAL PRE-OP: ICD-10-PCS | Mod: 26,,, | Performed by: RADIOLOGY

## 2021-07-28 PROCEDURE — 93010 EKG 12-LEAD: ICD-10-PCS | Mod: ,,, | Performed by: INTERNAL MEDICINE

## 2021-07-28 PROCEDURE — 93005 ELECTROCARDIOGRAM TRACING: CPT

## 2021-07-28 PROCEDURE — 71046 X-RAY EXAM CHEST 2 VIEWS: CPT | Mod: TC

## 2021-07-28 PROCEDURE — 71046 X-RAY EXAM CHEST 2 VIEWS: CPT | Mod: 26,,, | Performed by: RADIOLOGY

## 2021-08-12 ENCOUNTER — NURSE TRIAGE (OUTPATIENT)
Dept: ADMINISTRATIVE | Facility: CLINIC | Age: 61
End: 2021-08-12

## 2021-08-12 ENCOUNTER — TELEPHONE (OUTPATIENT)
Dept: PREADMISSION TESTING | Facility: HOSPITAL | Age: 61
End: 2021-08-12

## 2021-08-18 ENCOUNTER — TELEPHONE (OUTPATIENT)
Dept: ORTHOPEDICS | Facility: CLINIC | Age: 61
End: 2021-08-18

## 2021-09-03 ENCOUNTER — TELEPHONE (OUTPATIENT)
Dept: PREADMISSION TESTING | Facility: HOSPITAL | Age: 61
End: 2021-09-03

## 2021-09-03 ENCOUNTER — TELEPHONE (OUTPATIENT)
Dept: INTERNAL MEDICINE | Facility: CLINIC | Age: 61
End: 2021-09-03

## 2021-09-08 ENCOUNTER — ANESTHESIA EVENT (OUTPATIENT)
Dept: SURGERY | Facility: HOSPITAL | Age: 61
End: 2021-09-08
Payer: COMMERCIAL

## 2021-09-08 ENCOUNTER — TELEPHONE (OUTPATIENT)
Dept: PREADMISSION TESTING | Facility: HOSPITAL | Age: 61
End: 2021-09-08

## 2021-09-09 ENCOUNTER — ANESTHESIA (OUTPATIENT)
Dept: SURGERY | Facility: HOSPITAL | Age: 61
End: 2021-09-09
Payer: COMMERCIAL

## 2021-09-09 ENCOUNTER — TELEPHONE (OUTPATIENT)
Dept: ORTHOPEDICS | Facility: CLINIC | Age: 61
End: 2021-09-09

## 2021-09-09 ENCOUNTER — HOSPITAL ENCOUNTER (OUTPATIENT)
Facility: HOSPITAL | Age: 61
Discharge: HOME | End: 2021-09-09
Attending: ORTHOPAEDIC SURGERY | Admitting: ORTHOPAEDIC SURGERY
Payer: COMMERCIAL

## 2021-09-09 VITALS
WEIGHT: 198.06 LBS | BODY MASS INDEX: 29.34 KG/M2 | TEMPERATURE: 98 F | RESPIRATION RATE: 20 BRPM | HEART RATE: 59 BPM | HEIGHT: 69 IN | SYSTOLIC BLOOD PRESSURE: 135 MMHG | OXYGEN SATURATION: 100 % | DIASTOLIC BLOOD PRESSURE: 87 MMHG

## 2021-09-09 DIAGNOSIS — M65.341 TRIGGER RING FINGER OF RIGHT HAND: ICD-10-CM

## 2021-09-09 DIAGNOSIS — G56.01 RIGHT CARPAL TUNNEL SYNDROME: Primary | ICD-10-CM

## 2021-09-09 LAB — SARS-COV-2 RDRP RESP QL NAA+PROBE: NEGATIVE

## 2021-09-09 PROCEDURE — 36000706: Performed by: ORTHOPAEDIC SURGERY

## 2021-09-09 PROCEDURE — 36000707: Performed by: ORTHOPAEDIC SURGERY

## 2021-09-09 PROCEDURE — 64721 CARPAL TUNNEL SURGERY: CPT | Mod: RT,,, | Performed by: ORTHOPAEDIC SURGERY

## 2021-09-09 PROCEDURE — U0002 COVID-19 LAB TEST NON-CDC: HCPCS | Performed by: ORTHOPAEDIC SURGERY

## 2021-09-09 PROCEDURE — 63600175 PHARM REV CODE 636 W HCPCS: Performed by: ANESTHESIOLOGY

## 2021-09-09 PROCEDURE — 26055 PR INCISE FINGER TENDON SHEATH: ICD-10-PCS | Mod: 51,F7,, | Performed by: ORTHOPAEDIC SURGERY

## 2021-09-09 PROCEDURE — D9220A PRA ANESTHESIA: ICD-10-PCS | Mod: CRNA,,, | Performed by: NURSE ANESTHETIST, CERTIFIED REGISTERED

## 2021-09-09 PROCEDURE — 26055 INCISE FINGER TENDON SHEATH: CPT | Mod: 51,F7,, | Performed by: ORTHOPAEDIC SURGERY

## 2021-09-09 PROCEDURE — 63600175 PHARM REV CODE 636 W HCPCS: Performed by: NURSE ANESTHETIST, CERTIFIED REGISTERED

## 2021-09-09 PROCEDURE — 64721 PR REVISE MEDIAN N/CARPAL TUNNEL SURG: ICD-10-PCS | Mod: RT,,, | Performed by: ORTHOPAEDIC SURGERY

## 2021-09-09 PROCEDURE — 71000015 HC POSTOP RECOV 1ST HR: Performed by: ORTHOPAEDIC SURGERY

## 2021-09-09 PROCEDURE — 71000033 HC RECOVERY, INTIAL HOUR: Performed by: ORTHOPAEDIC SURGERY

## 2021-09-09 PROCEDURE — D9220A PRA ANESTHESIA: ICD-10-PCS | Mod: ANES,,, | Performed by: ANESTHESIOLOGY

## 2021-09-09 PROCEDURE — D9220A PRA ANESTHESIA: Mod: CRNA,,, | Performed by: NURSE ANESTHETIST, CERTIFIED REGISTERED

## 2021-09-09 PROCEDURE — 37000008 HC ANESTHESIA 1ST 15 MINUTES: Performed by: ORTHOPAEDIC SURGERY

## 2021-09-09 PROCEDURE — 37000009 HC ANESTHESIA EA ADD 15 MINS: Performed by: ORTHOPAEDIC SURGERY

## 2021-09-09 PROCEDURE — 25000003 PHARM REV CODE 250: Performed by: ORTHOPAEDIC SURGERY

## 2021-09-09 PROCEDURE — D9220A PRA ANESTHESIA: Mod: ANES,,, | Performed by: ANESTHESIOLOGY

## 2021-09-09 RX ORDER — ALBUTEROL SULFATE 0.83 MG/ML
2.5 SOLUTION RESPIRATORY (INHALATION) EVERY 4 HOURS PRN
Status: DISCONTINUED | OUTPATIENT
Start: 2021-09-09 | End: 2021-09-09 | Stop reason: HOSPADM

## 2021-09-09 RX ORDER — CEFAZOLIN SODIUM 1 G/3ML
INJECTION, POWDER, FOR SOLUTION INTRAMUSCULAR; INTRAVENOUS
Status: DISCONTINUED | OUTPATIENT
Start: 2021-09-09 | End: 2021-09-09

## 2021-09-09 RX ORDER — DIPHENHYDRAMINE HYDROCHLORIDE 50 MG/ML
25 INJECTION INTRAMUSCULAR; INTRAVENOUS EVERY 6 HOURS PRN
Status: DISCONTINUED | OUTPATIENT
Start: 2021-09-09 | End: 2021-09-09 | Stop reason: HOSPADM

## 2021-09-09 RX ORDER — SODIUM CHLORIDE, SODIUM LACTATE, POTASSIUM CHLORIDE, CALCIUM CHLORIDE 600; 310; 30; 20 MG/100ML; MG/100ML; MG/100ML; MG/100ML
INJECTION, SOLUTION INTRAVENOUS CONTINUOUS
Status: DISCONTINUED | OUTPATIENT
Start: 2021-09-09 | End: 2021-09-09 | Stop reason: HOSPADM

## 2021-09-09 RX ORDER — LIDOCAINE HCL/PF 100 MG/5ML
SYRINGE (ML) INTRAVENOUS
Status: DISCONTINUED | OUTPATIENT
Start: 2021-09-09 | End: 2021-09-09

## 2021-09-09 RX ORDER — LIDOCAINE HYDROCHLORIDE 20 MG/ML
INJECTION, SOLUTION INFILTRATION; PERINEURAL
Status: DISCONTINUED
Start: 2021-09-09 | End: 2021-09-09 | Stop reason: HOSPADM

## 2021-09-09 RX ORDER — CHLORHEXIDINE GLUCONATE ORAL RINSE 1.2 MG/ML
10 SOLUTION DENTAL 2 TIMES DAILY
Status: DISCONTINUED | OUTPATIENT
Start: 2021-09-09 | End: 2021-09-09 | Stop reason: HOSPADM

## 2021-09-09 RX ORDER — HYDROCODONE BITARTRATE AND ACETAMINOPHEN 5; 325 MG/1; MG/1
1 TABLET ORAL EVERY 4 HOURS PRN
Status: DISCONTINUED | OUTPATIENT
Start: 2021-09-09 | End: 2021-09-09 | Stop reason: HOSPADM

## 2021-09-09 RX ORDER — CHLORHEXIDINE GLUCONATE ORAL RINSE 1.2 MG/ML
10 SOLUTION DENTAL 2 TIMES DAILY
Status: CANCELLED | OUTPATIENT
Start: 2021-09-09 | End: 2021-09-14

## 2021-09-09 RX ORDER — HYDROCODONE BITARTRATE AND ACETAMINOPHEN 5; 325 MG/1; MG/1
1 TABLET ORAL EVERY 6 HOURS PRN
Qty: 15 TABLET | Refills: 0 | Status: SHIPPED | OUTPATIENT
Start: 2021-09-09 | End: 2022-10-26 | Stop reason: ALTCHOICE

## 2021-09-09 RX ORDER — MEPERIDINE HYDROCHLORIDE 25 MG/ML
12.5 INJECTION INTRAMUSCULAR; INTRAVENOUS; SUBCUTANEOUS ONCE
Status: DISCONTINUED | OUTPATIENT
Start: 2021-09-09 | End: 2021-09-09 | Stop reason: HOSPADM

## 2021-09-09 RX ORDER — LIDOCAINE HYDROCHLORIDE 20 MG/ML
INJECTION, SOLUTION EPIDURAL; INFILTRATION; INTRACAUDAL; PERINEURAL
Status: DISCONTINUED | OUTPATIENT
Start: 2021-09-09 | End: 2021-09-09 | Stop reason: HOSPADM

## 2021-09-09 RX ORDER — FENTANYL CITRATE 50 UG/ML
INJECTION, SOLUTION INTRAMUSCULAR; INTRAVENOUS
Status: DISCONTINUED | OUTPATIENT
Start: 2021-09-09 | End: 2021-09-09

## 2021-09-09 RX ORDER — PROPOFOL 10 MG/ML
VIAL (ML) INTRAVENOUS
Status: DISCONTINUED | OUTPATIENT
Start: 2021-09-09 | End: 2021-09-09

## 2021-09-09 RX ORDER — ONDANSETRON 2 MG/ML
4 INJECTION INTRAMUSCULAR; INTRAVENOUS ONCE AS NEEDED
Status: DISCONTINUED | OUTPATIENT
Start: 2021-09-09 | End: 2021-09-09 | Stop reason: HOSPADM

## 2021-09-09 RX ORDER — HYDROCODONE BITARTRATE AND ACETAMINOPHEN 5; 325 MG/1; MG/1
1 TABLET ORAL
Status: DISCONTINUED | OUTPATIENT
Start: 2021-09-09 | End: 2021-09-09 | Stop reason: HOSPADM

## 2021-09-09 RX ORDER — MIDAZOLAM HYDROCHLORIDE 1 MG/ML
INJECTION INTRAMUSCULAR; INTRAVENOUS
Status: DISCONTINUED | OUTPATIENT
Start: 2021-09-09 | End: 2021-09-09

## 2021-09-09 RX ORDER — FENTANYL CITRATE 50 UG/ML
25 INJECTION, SOLUTION INTRAMUSCULAR; INTRAVENOUS EVERY 5 MIN PRN
Status: DISCONTINUED | OUTPATIENT
Start: 2021-09-09 | End: 2021-09-09 | Stop reason: HOSPADM

## 2021-09-09 RX ORDER — HYDROCODONE BITARTRATE AND ACETAMINOPHEN 5; 325 MG/1; MG/1
1 TABLET ORAL EVERY 4 HOURS PRN
Status: CANCELLED | OUTPATIENT
Start: 2021-09-09

## 2021-09-09 RX ORDER — HYDROCODONE BITARTRATE AND ACETAMINOPHEN 5; 325 MG/1; MG/1
1 TABLET ORAL EVERY 6 HOURS PRN
Qty: 15 TABLET | Refills: 0 | Status: SHIPPED | OUTPATIENT
Start: 2021-09-09 | End: 2022-09-28 | Stop reason: ALTCHOICE

## 2021-09-09 RX ADMIN — PROPOFOL 30 MG: 10 INJECTION, EMULSION INTRAVENOUS at 11:09

## 2021-09-09 RX ADMIN — SODIUM CHLORIDE, SODIUM LACTATE, POTASSIUM CHLORIDE, AND CALCIUM CHLORIDE: .6; .31; .03; .02 INJECTION, SOLUTION INTRAVENOUS at 07:09

## 2021-09-09 RX ADMIN — FENTANYL CITRATE 50 MCG: 50 INJECTION, SOLUTION INTRAMUSCULAR; INTRAVENOUS at 11:09

## 2021-09-09 RX ADMIN — PROPOFOL 40 MG: 10 INJECTION, EMULSION INTRAVENOUS at 11:09

## 2021-09-09 RX ADMIN — Medication 100 MG: at 11:09

## 2021-09-09 RX ADMIN — FENTANYL CITRATE 25 MCG: 50 INJECTION, SOLUTION INTRAMUSCULAR; INTRAVENOUS at 11:09

## 2021-09-09 RX ADMIN — MIDAZOLAM HYDROCHLORIDE 2 MG: 1 INJECTION INTRAMUSCULAR; INTRAVENOUS at 11:09

## 2021-09-09 RX ADMIN — CEFAZOLIN 2 G: 1 INJECTION, POWDER, FOR SOLUTION INTRAMUSCULAR; INTRAVENOUS at 11:09

## 2021-09-09 RX ADMIN — PROPOFOL 100 MG: 10 INJECTION, EMULSION INTRAVENOUS at 11:09

## 2021-09-10 ENCOUNTER — NURSE TRIAGE (OUTPATIENT)
Dept: ADMINISTRATIVE | Facility: CLINIC | Age: 61
End: 2021-09-10

## 2021-09-13 ENCOUNTER — OFFICE VISIT (OUTPATIENT)
Dept: INTERNAL MEDICINE | Facility: CLINIC | Age: 61
End: 2021-09-13
Payer: COMMERCIAL

## 2021-09-13 VITALS
HEIGHT: 69 IN | OXYGEN SATURATION: 96 % | BODY MASS INDEX: 29.29 KG/M2 | DIASTOLIC BLOOD PRESSURE: 78 MMHG | HEART RATE: 112 BPM | SYSTOLIC BLOOD PRESSURE: 110 MMHG | TEMPERATURE: 99 F | WEIGHT: 197.75 LBS

## 2021-09-13 DIAGNOSIS — F10.29 ALCOHOL DEPENDENCE WITH UNSPECIFIED ALCOHOL-INDUCED DISORDER: ICD-10-CM

## 2021-09-13 DIAGNOSIS — F41.1 GAD (GENERALIZED ANXIETY DISORDER): Primary | ICD-10-CM

## 2021-09-13 PROCEDURE — 3008F PR BODY MASS INDEX (BMI) DOCUMENTED: ICD-10-PCS | Mod: CPTII,S$GLB,, | Performed by: NURSE PRACTITIONER

## 2021-09-13 PROCEDURE — 3074F SYST BP LT 130 MM HG: CPT | Mod: CPTII,S$GLB,, | Performed by: NURSE PRACTITIONER

## 2021-09-13 PROCEDURE — 1160F RVW MEDS BY RX/DR IN RCRD: CPT | Mod: CPTII,S$GLB,, | Performed by: NURSE PRACTITIONER

## 2021-09-13 PROCEDURE — 1159F MED LIST DOCD IN RCRD: CPT | Mod: CPTII,S$GLB,, | Performed by: NURSE PRACTITIONER

## 2021-09-13 PROCEDURE — 1160F PR REVIEW ALL MEDS BY PRESCRIBER/CLIN PHARMACIST DOCUMENTED: ICD-10-PCS | Mod: CPTII,S$GLB,, | Performed by: NURSE PRACTITIONER

## 2021-09-13 PROCEDURE — 3078F DIAST BP <80 MM HG: CPT | Mod: CPTII,S$GLB,, | Performed by: NURSE PRACTITIONER

## 2021-09-13 PROCEDURE — 3074F PR MOST RECENT SYSTOLIC BLOOD PRESSURE < 130 MM HG: ICD-10-PCS | Mod: CPTII,S$GLB,, | Performed by: NURSE PRACTITIONER

## 2021-09-13 PROCEDURE — 99214 PR OFFICE/OUTPT VISIT, EST, LEVL IV, 30-39 MIN: ICD-10-PCS | Mod: S$GLB,,, | Performed by: NURSE PRACTITIONER

## 2021-09-13 PROCEDURE — 4010F ACE/ARB THERAPY RXD/TAKEN: CPT | Mod: CPTII,S$GLB,, | Performed by: NURSE PRACTITIONER

## 2021-09-13 PROCEDURE — 3008F BODY MASS INDEX DOCD: CPT | Mod: CPTII,S$GLB,, | Performed by: NURSE PRACTITIONER

## 2021-09-13 PROCEDURE — 99214 OFFICE O/P EST MOD 30 MIN: CPT | Mod: S$GLB,,, | Performed by: NURSE PRACTITIONER

## 2021-09-13 PROCEDURE — 1159F PR MEDICATION LIST DOCUMENTED IN MEDICAL RECORD: ICD-10-PCS | Mod: CPTII,S$GLB,, | Performed by: NURSE PRACTITIONER

## 2021-09-13 PROCEDURE — 99999 PR PBB SHADOW E&M-EST. PATIENT-LVL III: ICD-10-PCS | Mod: PBBFAC,,, | Performed by: NURSE PRACTITIONER

## 2021-09-13 PROCEDURE — 4010F PR ACE/ARB THEARPY RXD/TAKEN: ICD-10-PCS | Mod: CPTII,S$GLB,, | Performed by: NURSE PRACTITIONER

## 2021-09-13 PROCEDURE — 99999 PR PBB SHADOW E&M-EST. PATIENT-LVL III: CPT | Mod: PBBFAC,,, | Performed by: NURSE PRACTITIONER

## 2021-09-13 PROCEDURE — 3078F PR MOST RECENT DIASTOLIC BLOOD PRESSURE < 80 MM HG: ICD-10-PCS | Mod: CPTII,S$GLB,, | Performed by: NURSE PRACTITIONER

## 2021-09-13 RX ORDER — VENLAFAXINE HYDROCHLORIDE 37.5 MG/1
37.5 CAPSULE, EXTENDED RELEASE ORAL DAILY
Qty: 30 CAPSULE | Refills: 1 | Status: SHIPPED | OUTPATIENT
Start: 2021-09-13 | End: 2021-09-13 | Stop reason: CLARIF

## 2021-09-13 RX ORDER — VENLAFAXINE HYDROCHLORIDE 37.5 MG/1
37.5 CAPSULE, EXTENDED RELEASE ORAL DAILY
Qty: 30 CAPSULE | Refills: 1 | Status: SHIPPED | OUTPATIENT
Start: 2021-09-13 | End: 2022-09-28 | Stop reason: ALTCHOICE

## 2021-09-14 ENCOUNTER — OFFICE VISIT (OUTPATIENT)
Dept: ORTHOPEDICS | Facility: CLINIC | Age: 61
End: 2021-09-14
Payer: COMMERCIAL

## 2021-09-14 VITALS
DIASTOLIC BLOOD PRESSURE: 92 MMHG | HEART RATE: 76 BPM | BODY MASS INDEX: 29.18 KG/M2 | HEIGHT: 69 IN | WEIGHT: 197 LBS | SYSTOLIC BLOOD PRESSURE: 137 MMHG

## 2021-09-14 DIAGNOSIS — M65.331 TRIGGER FINGER, RIGHT MIDDLE FINGER: ICD-10-CM

## 2021-09-14 DIAGNOSIS — G56.01 CARPAL TUNNEL SYNDROME OF RIGHT WRIST: Primary | ICD-10-CM

## 2021-09-14 DIAGNOSIS — M65.341 TRIGGER FINGER, RIGHT RING FINGER: ICD-10-CM

## 2021-09-14 PROCEDURE — 99999 PR PBB SHADOW E&M-EST. PATIENT-LVL III: CPT | Mod: PBBFAC,,, | Performed by: PHYSICIAN ASSISTANT

## 2021-09-14 PROCEDURE — 1160F RVW MEDS BY RX/DR IN RCRD: CPT | Mod: CPTII,S$GLB,, | Performed by: PHYSICIAN ASSISTANT

## 2021-09-14 PROCEDURE — 3075F SYST BP GE 130 - 139MM HG: CPT | Mod: CPTII,S$GLB,, | Performed by: PHYSICIAN ASSISTANT

## 2021-09-14 PROCEDURE — 99024 POSTOP FOLLOW-UP VISIT: CPT | Mod: S$GLB,,, | Performed by: PHYSICIAN ASSISTANT

## 2021-09-14 PROCEDURE — 99024 PR POST-OP FOLLOW-UP VISIT: ICD-10-PCS | Mod: S$GLB,,, | Performed by: PHYSICIAN ASSISTANT

## 2021-09-14 PROCEDURE — 3008F PR BODY MASS INDEX (BMI) DOCUMENTED: ICD-10-PCS | Mod: CPTII,S$GLB,, | Performed by: PHYSICIAN ASSISTANT

## 2021-09-14 PROCEDURE — 4010F ACE/ARB THERAPY RXD/TAKEN: CPT | Mod: CPTII,S$GLB,, | Performed by: PHYSICIAN ASSISTANT

## 2021-09-14 PROCEDURE — 1159F PR MEDICATION LIST DOCUMENTED IN MEDICAL RECORD: ICD-10-PCS | Mod: CPTII,S$GLB,, | Performed by: PHYSICIAN ASSISTANT

## 2021-09-14 PROCEDURE — 1159F MED LIST DOCD IN RCRD: CPT | Mod: CPTII,S$GLB,, | Performed by: PHYSICIAN ASSISTANT

## 2021-09-14 PROCEDURE — 1160F PR REVIEW ALL MEDS BY PRESCRIBER/CLIN PHARMACIST DOCUMENTED: ICD-10-PCS | Mod: CPTII,S$GLB,, | Performed by: PHYSICIAN ASSISTANT

## 2021-09-14 PROCEDURE — 3008F BODY MASS INDEX DOCD: CPT | Mod: CPTII,S$GLB,, | Performed by: PHYSICIAN ASSISTANT

## 2021-09-14 PROCEDURE — 3080F PR MOST RECENT DIASTOLIC BLOOD PRESSURE >= 90 MM HG: ICD-10-PCS | Mod: CPTII,S$GLB,, | Performed by: PHYSICIAN ASSISTANT

## 2021-09-14 PROCEDURE — 3075F PR MOST RECENT SYSTOLIC BLOOD PRESS GE 130-139MM HG: ICD-10-PCS | Mod: CPTII,S$GLB,, | Performed by: PHYSICIAN ASSISTANT

## 2021-09-14 PROCEDURE — 3080F DIAST BP >= 90 MM HG: CPT | Mod: CPTII,S$GLB,, | Performed by: PHYSICIAN ASSISTANT

## 2021-09-14 PROCEDURE — 99999 PR PBB SHADOW E&M-EST. PATIENT-LVL III: ICD-10-PCS | Mod: PBBFAC,,, | Performed by: PHYSICIAN ASSISTANT

## 2021-09-14 PROCEDURE — 4010F PR ACE/ARB THEARPY RXD/TAKEN: ICD-10-PCS | Mod: CPTII,S$GLB,, | Performed by: PHYSICIAN ASSISTANT

## 2021-09-23 ENCOUNTER — OFFICE VISIT (OUTPATIENT)
Dept: ORTHOPEDICS | Facility: CLINIC | Age: 61
End: 2021-09-23
Payer: COMMERCIAL

## 2021-09-23 VITALS
BODY MASS INDEX: 29.19 KG/M2 | DIASTOLIC BLOOD PRESSURE: 80 MMHG | HEART RATE: 78 BPM | HEIGHT: 69 IN | WEIGHT: 197.06 LBS | TEMPERATURE: 98 F | SYSTOLIC BLOOD PRESSURE: 118 MMHG

## 2021-09-23 DIAGNOSIS — M65.331 TRIGGER FINGER, RIGHT MIDDLE FINGER: ICD-10-CM

## 2021-09-23 DIAGNOSIS — G56.01 CARPAL TUNNEL SYNDROME OF RIGHT WRIST: Primary | ICD-10-CM

## 2021-09-23 DIAGNOSIS — M65.341 TRIGGER FINGER, RIGHT RING FINGER: ICD-10-CM

## 2021-09-23 PROCEDURE — 1159F PR MEDICATION LIST DOCUMENTED IN MEDICAL RECORD: ICD-10-PCS | Mod: CPTII,S$GLB,, | Performed by: PHYSICIAN ASSISTANT

## 2021-09-23 PROCEDURE — 99999 PR PBB SHADOW E&M-EST. PATIENT-LVL IV: ICD-10-PCS | Mod: PBBFAC,,, | Performed by: PHYSICIAN ASSISTANT

## 2021-09-23 PROCEDURE — 3008F PR BODY MASS INDEX (BMI) DOCUMENTED: ICD-10-PCS | Mod: CPTII,S$GLB,, | Performed by: PHYSICIAN ASSISTANT

## 2021-09-23 PROCEDURE — 4010F ACE/ARB THERAPY RXD/TAKEN: CPT | Mod: CPTII,S$GLB,, | Performed by: PHYSICIAN ASSISTANT

## 2021-09-23 PROCEDURE — 1160F RVW MEDS BY RX/DR IN RCRD: CPT | Mod: CPTII,S$GLB,, | Performed by: PHYSICIAN ASSISTANT

## 2021-09-23 PROCEDURE — 99999 PR PBB SHADOW E&M-EST. PATIENT-LVL IV: CPT | Mod: PBBFAC,,, | Performed by: PHYSICIAN ASSISTANT

## 2021-09-23 PROCEDURE — 99024 POSTOP FOLLOW-UP VISIT: CPT | Mod: S$GLB,,, | Performed by: PHYSICIAN ASSISTANT

## 2021-09-23 PROCEDURE — 99024 PR POST-OP FOLLOW-UP VISIT: ICD-10-PCS | Mod: S$GLB,,, | Performed by: PHYSICIAN ASSISTANT

## 2021-09-23 PROCEDURE — 3079F PR MOST RECENT DIASTOLIC BLOOD PRESSURE 80-89 MM HG: ICD-10-PCS | Mod: CPTII,S$GLB,, | Performed by: PHYSICIAN ASSISTANT

## 2021-09-23 PROCEDURE — 3074F SYST BP LT 130 MM HG: CPT | Mod: CPTII,S$GLB,, | Performed by: PHYSICIAN ASSISTANT

## 2021-09-23 PROCEDURE — 3079F DIAST BP 80-89 MM HG: CPT | Mod: CPTII,S$GLB,, | Performed by: PHYSICIAN ASSISTANT

## 2021-09-23 PROCEDURE — 3074F PR MOST RECENT SYSTOLIC BLOOD PRESSURE < 130 MM HG: ICD-10-PCS | Mod: CPTII,S$GLB,, | Performed by: PHYSICIAN ASSISTANT

## 2021-09-23 PROCEDURE — 3008F BODY MASS INDEX DOCD: CPT | Mod: CPTII,S$GLB,, | Performed by: PHYSICIAN ASSISTANT

## 2021-09-23 PROCEDURE — 4010F PR ACE/ARB THEARPY RXD/TAKEN: ICD-10-PCS | Mod: CPTII,S$GLB,, | Performed by: PHYSICIAN ASSISTANT

## 2021-09-23 PROCEDURE — 1159F MED LIST DOCD IN RCRD: CPT | Mod: CPTII,S$GLB,, | Performed by: PHYSICIAN ASSISTANT

## 2021-09-23 PROCEDURE — 1160F PR REVIEW ALL MEDS BY PRESCRIBER/CLIN PHARMACIST DOCUMENTED: ICD-10-PCS | Mod: CPTII,S$GLB,, | Performed by: PHYSICIAN ASSISTANT

## 2021-09-27 ENCOUNTER — CLINICAL SUPPORT (OUTPATIENT)
Dept: REHABILITATION | Facility: HOSPITAL | Age: 61
End: 2021-09-27
Payer: COMMERCIAL

## 2021-09-27 DIAGNOSIS — M65.331 TRIGGER FINGER, RIGHT MIDDLE FINGER: ICD-10-CM

## 2021-09-27 DIAGNOSIS — M62.81 DECREASED MUSCLE STRENGTH: ICD-10-CM

## 2021-09-27 DIAGNOSIS — M25.60 DECREASED RANGE OF MOTION: ICD-10-CM

## 2021-09-27 DIAGNOSIS — M65.341 TRIGGER FINGER, RIGHT RING FINGER: ICD-10-CM

## 2021-09-27 DIAGNOSIS — G56.01 CARPAL TUNNEL SYNDROME OF RIGHT WRIST: ICD-10-CM

## 2021-09-27 DIAGNOSIS — R60.9 SWELLING: ICD-10-CM

## 2021-09-27 PROCEDURE — 97166 OT EVAL MOD COMPLEX 45 MIN: CPT | Performed by: OCCUPATIONAL THERAPIST

## 2021-09-27 PROCEDURE — 97110 THERAPEUTIC EXERCISES: CPT | Performed by: OCCUPATIONAL THERAPIST

## 2021-09-29 ENCOUNTER — CLINICAL SUPPORT (OUTPATIENT)
Dept: REHABILITATION | Facility: HOSPITAL | Age: 61
End: 2021-09-29
Payer: COMMERCIAL

## 2021-09-29 DIAGNOSIS — R60.9 SWELLING: ICD-10-CM

## 2021-09-29 DIAGNOSIS — M25.60 DECREASED RANGE OF MOTION: ICD-10-CM

## 2021-09-29 DIAGNOSIS — M62.81 DECREASED MUSCLE STRENGTH: ICD-10-CM

## 2021-09-29 PROCEDURE — 97140 MANUAL THERAPY 1/> REGIONS: CPT | Performed by: OCCUPATIONAL THERAPIST

## 2021-09-29 PROCEDURE — 97110 THERAPEUTIC EXERCISES: CPT | Performed by: OCCUPATIONAL THERAPIST

## 2021-10-07 ENCOUNTER — CLINICAL SUPPORT (OUTPATIENT)
Dept: REHABILITATION | Facility: HOSPITAL | Age: 61
End: 2021-10-07
Payer: COMMERCIAL

## 2021-10-07 DIAGNOSIS — M62.81 DECREASED MUSCLE STRENGTH: ICD-10-CM

## 2021-10-07 DIAGNOSIS — M25.60 DECREASED RANGE OF MOTION: ICD-10-CM

## 2021-10-07 DIAGNOSIS — R60.9 SWELLING: ICD-10-CM

## 2021-10-07 PROCEDURE — 97140 MANUAL THERAPY 1/> REGIONS: CPT

## 2021-10-07 PROCEDURE — 97110 THERAPEUTIC EXERCISES: CPT

## 2021-10-08 ENCOUNTER — OFFICE VISIT (OUTPATIENT)
Dept: ORTHOPEDICS | Facility: CLINIC | Age: 61
End: 2021-10-08
Payer: COMMERCIAL

## 2021-10-08 VITALS
SYSTOLIC BLOOD PRESSURE: 120 MMHG | DIASTOLIC BLOOD PRESSURE: 86 MMHG | HEART RATE: 73 BPM | BODY MASS INDEX: 29.18 KG/M2 | HEIGHT: 69 IN | WEIGHT: 197 LBS

## 2021-10-08 DIAGNOSIS — M65.331 TRIGGER FINGER, RIGHT MIDDLE FINGER: ICD-10-CM

## 2021-10-08 DIAGNOSIS — G56.01 CARPAL TUNNEL SYNDROME OF RIGHT WRIST: Primary | ICD-10-CM

## 2021-10-08 PROCEDURE — 3074F PR MOST RECENT SYSTOLIC BLOOD PRESSURE < 130 MM HG: ICD-10-PCS | Mod: CPTII,S$GLB,, | Performed by: PHYSICIAN ASSISTANT

## 2021-10-08 PROCEDURE — 3074F SYST BP LT 130 MM HG: CPT | Mod: CPTII,S$GLB,, | Performed by: PHYSICIAN ASSISTANT

## 2021-10-08 PROCEDURE — 3079F DIAST BP 80-89 MM HG: CPT | Mod: CPTII,S$GLB,, | Performed by: PHYSICIAN ASSISTANT

## 2021-10-08 PROCEDURE — 3079F PR MOST RECENT DIASTOLIC BLOOD PRESSURE 80-89 MM HG: ICD-10-PCS | Mod: CPTII,S$GLB,, | Performed by: PHYSICIAN ASSISTANT

## 2021-10-08 PROCEDURE — 3008F BODY MASS INDEX DOCD: CPT | Mod: CPTII,S$GLB,, | Performed by: PHYSICIAN ASSISTANT

## 2021-10-08 PROCEDURE — 4010F PR ACE/ARB THEARPY RXD/TAKEN: ICD-10-PCS | Mod: CPTII,S$GLB,, | Performed by: PHYSICIAN ASSISTANT

## 2021-10-08 PROCEDURE — 3008F PR BODY MASS INDEX (BMI) DOCUMENTED: ICD-10-PCS | Mod: CPTII,S$GLB,, | Performed by: PHYSICIAN ASSISTANT

## 2021-10-08 PROCEDURE — 1160F RVW MEDS BY RX/DR IN RCRD: CPT | Mod: CPTII,S$GLB,, | Performed by: PHYSICIAN ASSISTANT

## 2021-10-08 PROCEDURE — 99024 POSTOP FOLLOW-UP VISIT: CPT | Mod: S$GLB,,, | Performed by: PHYSICIAN ASSISTANT

## 2021-10-08 PROCEDURE — 99999 PR PBB SHADOW E&M-EST. PATIENT-LVL III: CPT | Mod: PBBFAC,,, | Performed by: PHYSICIAN ASSISTANT

## 2021-10-08 PROCEDURE — 99024 PR POST-OP FOLLOW-UP VISIT: ICD-10-PCS | Mod: S$GLB,,, | Performed by: PHYSICIAN ASSISTANT

## 2021-10-08 PROCEDURE — 1160F PR REVIEW ALL MEDS BY PRESCRIBER/CLIN PHARMACIST DOCUMENTED: ICD-10-PCS | Mod: CPTII,S$GLB,, | Performed by: PHYSICIAN ASSISTANT

## 2021-10-08 PROCEDURE — 4010F ACE/ARB THERAPY RXD/TAKEN: CPT | Mod: CPTII,S$GLB,, | Performed by: PHYSICIAN ASSISTANT

## 2021-10-08 PROCEDURE — 1159F MED LIST DOCD IN RCRD: CPT | Mod: CPTII,S$GLB,, | Performed by: PHYSICIAN ASSISTANT

## 2021-10-08 PROCEDURE — 99999 PR PBB SHADOW E&M-EST. PATIENT-LVL III: ICD-10-PCS | Mod: PBBFAC,,, | Performed by: PHYSICIAN ASSISTANT

## 2021-10-08 PROCEDURE — 1159F PR MEDICATION LIST DOCUMENTED IN MEDICAL RECORD: ICD-10-PCS | Mod: CPTII,S$GLB,, | Performed by: PHYSICIAN ASSISTANT

## 2021-10-12 ENCOUNTER — CLINICAL SUPPORT (OUTPATIENT)
Dept: REHABILITATION | Facility: HOSPITAL | Age: 61
End: 2021-10-12
Payer: COMMERCIAL

## 2021-10-12 DIAGNOSIS — M25.60 DECREASED RANGE OF MOTION: ICD-10-CM

## 2021-10-12 DIAGNOSIS — G56.01 CARPAL TUNNEL SYNDROME OF RIGHT WRIST: ICD-10-CM

## 2021-10-12 DIAGNOSIS — M65.331 TRIGGER FINGER, RIGHT MIDDLE FINGER: ICD-10-CM

## 2021-10-12 DIAGNOSIS — M62.81 DECREASED MUSCLE STRENGTH: ICD-10-CM

## 2021-10-12 DIAGNOSIS — R60.9 SWELLING: ICD-10-CM

## 2021-10-12 PROCEDURE — 97110 THERAPEUTIC EXERCISES: CPT | Performed by: OCCUPATIONAL THERAPIST

## 2021-10-12 PROCEDURE — 97530 THERAPEUTIC ACTIVITIES: CPT | Performed by: OCCUPATIONAL THERAPIST

## 2021-10-12 PROCEDURE — 97035 APP MDLTY 1+ULTRASOUND EA 15: CPT | Performed by: OCCUPATIONAL THERAPIST

## 2021-10-15 ENCOUNTER — TELEPHONE (OUTPATIENT)
Dept: ORTHOPEDICS | Facility: CLINIC | Age: 61
End: 2021-10-15

## 2021-10-15 ENCOUNTER — CLINICAL SUPPORT (OUTPATIENT)
Dept: REHABILITATION | Facility: HOSPITAL | Age: 61
End: 2021-10-15
Payer: COMMERCIAL

## 2021-10-15 DIAGNOSIS — M25.60 DECREASED RANGE OF MOTION: ICD-10-CM

## 2021-10-15 DIAGNOSIS — M62.81 DECREASED MUSCLE STRENGTH: ICD-10-CM

## 2021-10-15 DIAGNOSIS — R60.9 SWELLING: ICD-10-CM

## 2021-10-15 PROCEDURE — 97530 THERAPEUTIC ACTIVITIES: CPT | Performed by: OCCUPATIONAL THERAPIST

## 2021-10-15 PROCEDURE — 97110 THERAPEUTIC EXERCISES: CPT | Performed by: OCCUPATIONAL THERAPIST

## 2021-10-15 PROCEDURE — 97140 MANUAL THERAPY 1/> REGIONS: CPT | Performed by: OCCUPATIONAL THERAPIST

## 2022-01-28 ENCOUNTER — DOCUMENTATION ONLY (OUTPATIENT)
Dept: REHABILITATION | Facility: HOSPITAL | Age: 62
End: 2022-01-28
Payer: MEDICAID

## 2022-01-28 NOTE — PROGRESS NOTES
[facility]  Occupational therapy  Discharge Note    Name: Aguilar Rowe  [facility] Number: [id number]    Therapy Diagnosis:        Encounter Diagnoses   Name Primary?    Swelling      Decreased range of motion      Decreased muscle strength        Physician: Ruth Vail PA     Visit Date: 10/15/2021  Physician Orders: Evaluation and treatment   Currently a patient at the [facility], adding R shoulder to the treatment  New order 10/8/2021     Medical Diagnosis:   M65.341 (ICD-10-CM) - Trigger finger, right ring finger   M65.331 (ICD-10-CM) - Trigger finger, right middle finger   G56.01 (ICD-10-CM) - Carpal tunnel syndrome of right wrist      Surgical Procedure and Date: right trigger finger release, 9/9/2021  S/p 3 weeks post op          Date of Last visit: 10/15/2022  Total Visits Received: 5    ASSESSMENT      Unable to reassess as patient did not return for therapy.    Discharge reason: Patient has not attended therapy since 10/15/2021.    Discharge FOTO Score: NA    Goals: GOALS:     Short Term Goals:  2 weeks     1. Pain: Pt will demonstrate improved pain by reports of less than or equal to 3/10 worst pain on the verbal rating scale in order to progress toward maximal functional ability and improve QOL.  Met 10/12/2021   2. Mobility: Patient will improve AROM to 50% of stated goals, listed in objective measures above, in order to progress towards independence with functional activities.   Met 10/12/2021   3. Strength: Patient will improve strength to 50% of stated goals, listed in objective measures above, in order to progress towards independence with functional activities.      4. Self Care: Patient will demonstrate improved ability to weight bear on his right hand with mild difficulty when getting out of the tub.  Met 10/12/2021   5. HEP: Patient will demonstrate independence with HEP in order to progress toward functional independence.  Progressing      Long Term Goals:  4 weeks      1. Pain: Pt will demonstrate improved pain by reports of less than or equal to 0/10 worst pain on the verbal rating scale in order to progress toward maximal functional ability and improve QOL.       2. Function: Patient will demonstrate improved function as indicated by a functional limitation score of less than or equal to 19 out of 100 on FOTO.     3. Mobility: Patient will improve AROM to stated goals, listed in objective measures above, in order to return to maximal functional potential and improve quality of life.     4. Strength: Patient will improve strength to stated goals, listed in objective measures above, in order to improve functional independence and quality of life.     5. Self Care: Patient will demonstrate the ability to weight bear on his hand to get out of the tub without difficulty.  with mild difficulty   6. Patient will return to normal ADL's, IADL's, community involvement, recreational activities, and work-related activities with less than or equal to 0/10 pain and maximal function.                     PLAN   This patient is discharged from Occupational Therapy      DEBORAH Tracey

## 2022-05-09 ENCOUNTER — HOSPITAL ENCOUNTER (EMERGENCY)
Facility: HOSPITAL | Age: 62
Discharge: HOME | End: 2022-05-09
Attending: EMERGENCY MEDICINE
Payer: MEDICAID

## 2022-05-09 VITALS
WEIGHT: 198.5 LBS | OXYGEN SATURATION: 99 % | HEART RATE: 63 BPM | DIASTOLIC BLOOD PRESSURE: 53 MMHG | RESPIRATION RATE: 18 BRPM | SYSTOLIC BLOOD PRESSURE: 109 MMHG | BODY MASS INDEX: 29.4 KG/M2 | TEMPERATURE: 99 F | HEIGHT: 69 IN

## 2022-05-09 DIAGNOSIS — F41.1 ANXIETY REACTION: ICD-10-CM

## 2022-05-09 DIAGNOSIS — R07.9 CHEST PAIN, UNSPECIFIED TYPE: ICD-10-CM

## 2022-05-09 DIAGNOSIS — R06.02 SOB (SHORTNESS OF BREATH): Primary | ICD-10-CM

## 2022-05-09 LAB
ALBUMIN SERPL BCP-MCNC: 4 G/DL (ref 3.5–5.2)
ALP SERPL-CCNC: 55 U/L (ref 55–135)
ALT SERPL W/O P-5'-P-CCNC: 29 U/L (ref 10–44)
ANION GAP SERPL CALC-SCNC: 9 MMOL/L (ref 8–16)
AST SERPL-CCNC: 24 U/L (ref 10–40)
BASOPHILS # BLD AUTO: 0.04 K/UL (ref 0–0.2)
BASOPHILS NFR BLD: 0.7 % (ref 0–1.9)
BILIRUB SERPL-MCNC: 0.9 MG/DL (ref 0.1–1)
BNP SERPL-MCNC: 40 PG/ML (ref 0–99)
BUN SERPL-MCNC: 16 MG/DL (ref 6–20)
CALCIUM SERPL-MCNC: 8.9 MG/DL (ref 8.7–10.5)
CHLORIDE SERPL-SCNC: 106 MMOL/L (ref 95–110)
CO2 SERPL-SCNC: 24 MMOL/L (ref 23–29)
CREAT SERPL-MCNC: 1 MG/DL (ref 0.5–1.4)
DIFFERENTIAL METHOD: ABNORMAL
EOSINOPHIL # BLD AUTO: 0 K/UL (ref 0–0.5)
EOSINOPHIL NFR BLD: 0.5 % (ref 0–8)
ERYTHROCYTE [DISTWIDTH] IN BLOOD BY AUTOMATED COUNT: 12 % (ref 11.5–14.5)
EST. GFR  (AFRICAN AMERICAN): >60 ML/MIN/1.73 M^2
EST. GFR  (NON AFRICAN AMERICAN): >60 ML/MIN/1.73 M^2
GLUCOSE SERPL-MCNC: 95 MG/DL (ref 70–110)
HCT VFR BLD AUTO: 40 % (ref 40–54)
HGB BLD-MCNC: 14.1 G/DL (ref 14–18)
IMM GRANULOCYTES # BLD AUTO: 0.02 K/UL (ref 0–0.04)
IMM GRANULOCYTES NFR BLD AUTO: 0.3 % (ref 0–0.5)
LYMPHOCYTES # BLD AUTO: 1 K/UL (ref 1–4.8)
LYMPHOCYTES NFR BLD: 18.2 % (ref 18–48)
MCH RBC QN AUTO: 34.3 PG (ref 27–31)
MCHC RBC AUTO-ENTMCNC: 35.3 G/DL (ref 32–36)
MCV RBC AUTO: 97 FL (ref 82–98)
MONOCYTES # BLD AUTO: 0.8 K/UL (ref 0.3–1)
MONOCYTES NFR BLD: 14.3 % (ref 4–15)
NEUTROPHILS # BLD AUTO: 3.8 K/UL (ref 1.8–7.7)
NEUTROPHILS NFR BLD: 66 % (ref 38–73)
NRBC BLD-RTO: 0 /100 WBC
PLATELET # BLD AUTO: 201 K/UL (ref 150–450)
PMV BLD AUTO: 10.2 FL (ref 9.2–12.9)
POTASSIUM SERPL-SCNC: 4.5 MMOL/L (ref 3.5–5.1)
PROT SERPL-MCNC: 6.8 G/DL (ref 6–8.4)
RBC # BLD AUTO: 4.11 M/UL (ref 4.6–6.2)
SODIUM SERPL-SCNC: 139 MMOL/L (ref 136–145)
TROPONIN I SERPL DL<=0.01 NG/ML-MCNC: 0.03 NG/ML (ref 0–0.03)
TROPONIN I SERPL DL<=0.01 NG/ML-MCNC: <0.006 NG/ML (ref 0–0.03)
WBC # BLD AUTO: 5.73 K/UL (ref 3.9–12.7)

## 2022-05-09 PROCEDURE — 93010 EKG 12-LEAD: ICD-10-PCS | Mod: ,,, | Performed by: STUDENT IN AN ORGANIZED HEALTH CARE EDUCATION/TRAINING PROGRAM

## 2022-05-09 PROCEDURE — 83880 ASSAY OF NATRIURETIC PEPTIDE: CPT | Mod: ER | Performed by: EMERGENCY MEDICINE

## 2022-05-09 PROCEDURE — 84484 ASSAY OF TROPONIN QUANT: CPT | Mod: ER | Performed by: EMERGENCY MEDICINE

## 2022-05-09 PROCEDURE — 80053 COMPREHEN METABOLIC PANEL: CPT | Mod: ER | Performed by: EMERGENCY MEDICINE

## 2022-05-09 PROCEDURE — 99285 EMERGENCY DEPT VISIT HI MDM: CPT | Mod: 25,ER

## 2022-05-09 PROCEDURE — 25000003 PHARM REV CODE 250: Mod: ER | Performed by: EMERGENCY MEDICINE

## 2022-05-09 PROCEDURE — 96360 HYDRATION IV INFUSION INIT: CPT | Mod: ER

## 2022-05-09 PROCEDURE — 85025 COMPLETE CBC W/AUTO DIFF WBC: CPT | Mod: ER | Performed by: EMERGENCY MEDICINE

## 2022-05-09 PROCEDURE — 93010 ELECTROCARDIOGRAM REPORT: CPT | Mod: ,,, | Performed by: STUDENT IN AN ORGANIZED HEALTH CARE EDUCATION/TRAINING PROGRAM

## 2022-05-09 PROCEDURE — 93005 ELECTROCARDIOGRAM TRACING: CPT | Mod: ER

## 2022-05-09 RX ORDER — NAPROXEN 500 MG/1
500 TABLET ORAL 2 TIMES DAILY WITH MEALS
Qty: 20 TABLET | Refills: 0 | Status: SHIPPED | OUTPATIENT
Start: 2022-05-09 | End: 2022-09-28 | Stop reason: ALTCHOICE

## 2022-05-09 RX ORDER — ASPIRIN 325 MG
325 TABLET ORAL
Status: COMPLETED | OUTPATIENT
Start: 2022-05-09 | End: 2022-05-09

## 2022-05-09 RX ORDER — HYDROXYZINE PAMOATE 25 MG/1
25 CAPSULE ORAL EVERY 6 HOURS PRN
Qty: 30 CAPSULE | Refills: 0 | Status: SHIPPED | OUTPATIENT
Start: 2022-05-09 | End: 2022-09-28 | Stop reason: SDUPTHER

## 2022-05-09 RX ORDER — HYDROXYZINE PAMOATE 25 MG/1
25 CAPSULE ORAL
Status: COMPLETED | OUTPATIENT
Start: 2022-05-09 | End: 2022-05-09

## 2022-05-09 RX ADMIN — SODIUM CHLORIDE 500 ML: 0.9 INJECTION, SOLUTION INTRAVENOUS at 10:05

## 2022-05-09 RX ADMIN — ASPIRIN 325 MG ORAL TABLET 325 MG: 325 PILL ORAL at 10:05

## 2022-05-09 RX ADMIN — HYDROXYZINE PAMOATE 25 MG: 25 CAPSULE ORAL at 10:05

## 2022-05-09 NOTE — ED PROVIDER NOTES
"Encounter Date: 5/9/2022       History     Chief Complaint   Patient presents with    Panic Attack     This morning approx. 6am became anxious and a little sob . Stressed out about dealing but life. Denies wanting to harm self.     The history is provided by the patient.   Shortness of Breath  This is a new problem. The average episode lasts 1 week. The problem occurs rarely.The current episode started more than 1 week ago. The problem has not changed since onset.Pertinent negatives include no fever, no headaches, no coryza, no rhinorrhea, no sore throat, no swollen glands, no ear pain, no neck pain, no cough, no sputum production, no hemoptysis, no wheezing, no PND, no orthopnea, no chest pain, no syncope, no vomiting, no abdominal pain, no rash, no leg pain, no leg swelling and no claudication. 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 Pt states he get sob when he starts thinking about these issues.. He has tried nothing for the symptoms. The treatment provided no relief. He has had no prior hospitalizations. He has had no prior ED visits. He has had no prior ICU admissions.     Review of patient's allergies indicates:  No Known Allergies  Past Medical History:   Diagnosis Date    Hypertension      Past Surgical History:   Procedure Laterality Date    APPENDECTOMY      CARPAL TUNNEL RELEASE Right 9/9/2021    Procedure: RELEASE, CARPAL TUNNEL;  Surgeon: Nilton Dubose MD;  Location: [facility] OR;  Service: Orthopedics;  Laterality: Right;  right carpal tunnel release and right long and ring trigger finger release    FINGER SURGERY Left     SHOULDER SURGERY Right     TRIGGER FINGER RELEASE Right 9/9/2021    Procedure: RELEASE, TRIGGER FINGER;  Surgeon: Nilton Dubose MD;  Location: [facility] OR;  Service: Orthopedics;  Laterality: Right;  right carpal tunnel " release and right long and ring trigger finger release     Family History   Problem Relation Age of Onset    No Known Problems Mother     No Known Problems Father      Social History     Tobacco Use    Smoking status: Never Smoker    Smokeless tobacco: Never Used   Substance Use Topics    Alcohol use: Yes     Alcohol/week: 4.0 standard drinks     Types: 4 Cans of beer per week     Comment: beer daily    Drug use: Never     Review of Systems   Constitutional: Negative for fever.   HENT: Negative for ear pain, rhinorrhea and sore throat.    Respiratory: Positive for shortness of breath. Negative for cough, hemoptysis, sputum production and wheezing.    Cardiovascular: Negative for chest pain, orthopnea, claudication, leg swelling, syncope and PND.   Gastrointestinal: Negative for abdominal pain, nausea and vomiting.   Genitourinary: Negative for dysuria.   Musculoskeletal: Negative for back pain and neck pain.   Skin: Negative for rash.   Neurological: Negative for weakness and headaches.   Hematological: Does not bruise/bleed easily.   All other systems reviewed and are negative.      Physical Exam     Initial Vitals [05/09/22 0923]   BP Pulse Resp Temp SpO2   130/84 76 20 98.9 °F (37.2 °C) 100 %      MAP       --         Physical Exam    Nursing note and vitals reviewed.  Constitutional: He appears well-developed and well-nourished. He is not diaphoretic. No distress.   HENT:   Head: Normocephalic and atraumatic.   Eyes: EOM are normal. Pupils are equal, round, and reactive to light. No scleral icterus.   Neck: Neck supple. No thyromegaly present.   Normal range of motion.  Cardiovascular: Normal rate, regular rhythm, normal heart sounds and intact distal pulses. Exam reveals no gallop and no friction rub.    No murmur heard.  Pulmonary/Chest: Breath sounds normal. No respiratory distress. He has no wheezes. He has no rhonchi. He exhibits no tenderness.   Abdominal: Abdomen is soft. Bowel sounds are normal. He  exhibits no distension. There is no abdominal tenderness. There is no rebound and no guarding.   Musculoskeletal:         General: No tenderness or edema. Normal range of motion.      Cervical back: Normal range of motion and neck supple.     Lymphadenopathy:     He has no cervical adenopathy.   Neurological: He is alert and oriented to person, place, and time. He has normal strength. No cranial nerve deficit or sensory deficit. GCS eye subscore is 4. GCS verbal subscore is 5. GCS motor subscore is 6.   No acute focal neuro deficits   Skin: Skin is warm and dry.   Psychiatric: His speech is normal and behavior is normal. Judgment and thought content normal. His mood appears anxious. He is not actively hallucinating. Cognition and memory are normal. He exhibits a depressed mood. He is attentive.         ED Course   Procedures  Labs Reviewed   CBC W/ AUTO DIFFERENTIAL - Abnormal; Notable for the following components:       Result Value    RBC 4.11 (*)     MCH 34.3 (*)     All other components within normal limits   TROPONIN I - Abnormal; Notable for the following components:    Troponin I 0.027 (*)     All other components within normal limits   COMPREHENSIVE METABOLIC PANEL   B-TYPE NATRIURETIC PEPTIDE   TROPONIN I     EKG Readings: (Independently Interpreted)   Initial Reading: No STEMI. Rhythm: Normal Sinus Rhythm. Heart Rate: 70. Ectopy: No Ectopy. Conduction: Normal. ST Segments: Normal ST Segments. T Waves: Normal. Axis: Normal. Clinical Impression: Normal Sinus Rhythm     ECG Results          EKG 12-lead (In process)  Result time 05/09/22 13:13:02    In process by Interface, Lab In [facility] (05/09/22 13:13:02)                 Narrative:    Test Reason : R06.02,    Vent. Rate : 070 BPM     Atrial Rate : 070 BPM     P-R Int : 188 ms          QRS Dur : 088 ms      QT Int : 414 ms       P-R-T Axes : 040 003 045 degrees     QTc Int : 447 ms    Normal sinus rhythm  Normal ECG  When compared with ECG of 28-JUL-2021  "10:01,  No significant change was found    Referred By: [email or web address]   SELF           Confirmed By:                             Imaging Results          X-Ray Chest AP Portable (Final result)  Result time 05/09/22 10:55:50    Final result by Noah Cox MD (05/09/22 10:55:50)                 Impression:      No acute process seen.      Electronically signed by: Noah Cox MD  Date:    05/09/2022  Time:    10:55             Narrative:    EXAMINATION:  XR CHEST AP PORTABLE    CLINICAL HISTORY:  sob;    FINDINGS:  Single view of the chest.  Comparison 07/28/2021    Cardiac silhouette is normal.  The lungs demonstrate no evidence of active disease.  No evidence of pleural effusion or pneumothorax.  Bones appear intact.  Moderate degenerative changes spine and SI joints.                                 Medications   aspirin tablet 325 mg (325 mg Oral Given 5/9/22 1021)   sodium chloride 0.9% bolus 500 mL (0 mLs Intravenous Stopped 5/9/22 1120)   hydrOXYzine pamoate capsule 25 mg (25 mg Oral Given 5/9/22 1021)        Additional MDM:   Heart Score:    History:          Slightly suspicious.  ECG:             Normal  Age:               45-65 years  Risk factors: 1-2 risk factors  Troponin:       1-2x normal limit  Final Score: 3                    Vitals:    05/09/22 0923 05/09/22 0945   BP: 130/84    Pulse: 76 74   Resp: 20    Temp: 98.9 °F (37.2 °C)    TempSrc: Oral    SpO2: 100%    Weight: 90.1 kg (198 lb 8.4 oz)    Height: 5' 9" (1.753 m)        Results for orders placed or performed during the hospital encounter of 05/09/22   CBC auto differential   Result Value Ref Range    WBC 5.73 3.90 - 12.70 K/uL    RBC 4.11 (L) 4.60 - 6.20 M/uL    Hemoglobin 14.1 14.0 - 18.0 g/dL    Hematocrit 40.0 40.0 - 54.0 %    MCV 97 82 - 98 fL    MCH 34.3 (H) 27.0 - 31.0 pg    MCHC 35.3 32.0 - 36.0 g/dL    RDW 12.0 11.5 - 14.5 %    Platelets 201 150 - 450 K/uL    MPV 10.2 9.2 - 12.9 fL    Immature Granulocytes 0.3 0.0 - 0.5 %    Gran # " (ANC) 3.8 1.8 - 7.7 K/uL    Immature Grans (Abs) 0.02 0.00 - 0.04 K/uL    Lymph # 1.0 1.0 - 4.8 K/uL    Mono # 0.8 0.3 - 1.0 K/uL    Eos # 0.0 0.0 - 0.5 K/uL    Baso # 0.04 0.00 - 0.20 K/uL    nRBC 0 0 /100 WBC    Gran % 66.0 38.0 - 73.0 %    Lymph % 18.2 18.0 - 48.0 %    Mono % 14.3 4.0 - 15.0 %    Eosinophil % 0.5 0.0 - 8.0 %    Basophil % 0.7 0.0 - 1.9 %    Differential Method Automated    Comprehensive metabolic panel   Result Value Ref Range    Sodium 139 136 - 145 mmol/L    Potassium 4.5 3.5 - 5.1 mmol/L    Chloride 106 95 - 110 mmol/L    CO2 24 23 - 29 mmol/L    Glucose 95 70 - 110 mg/dL    BUN 16 6 - 20 mg/dL    Creatinine 1.0 0.5 - 1.4 mg/dL    Calcium 8.9 8.7 - 10.5 mg/dL    Total Protein 6.8 6.0 - 8.4 g/dL    Albumin 4.0 3.5 - 5.2 g/dL    Total Bilirubin 0.9 0.1 - 1.0 mg/dL    Alkaline Phosphatase 55 55 - 135 U/L    AST 24 10 - 40 U/L    ALT 29 10 - 44 U/L    Anion Gap 9 8 - 16 mmol/L    eGFR if African American >60.0 >60 mL/min/1.73 m^2    eGFR if non African American >60.0 >60 mL/min/1.73 m^2   Troponin I #1   Result Value Ref Range    Troponin I 0.027 (H) 0.000 - 0.026 ng/mL   BNP   Result Value Ref Range    BNP 40 0 - 99 pg/mL   Troponin I #2   Result Value Ref Range    Troponin I <0.006 0.000 - 0.026 ng/mL         Imaging Results          X-Ray Chest AP Portable (Final result)  Result time 05/09/22 10:55:50    Final result by Noah Cox MD (05/09/22 10:55:50)                 Impression:      No acute process seen.      Electronically signed by: Noah Cox MD  Date:    05/09/2022  Time:    10:55             Narrative:    EXAMINATION:  XR CHEST AP PORTABLE    CLINICAL HISTORY:  sob;    FINDINGS:  Single view of the chest.  Comparison 07/28/2021    Cardiac silhouette is normal.  The lungs demonstrate no evidence of active disease.  No evidence of pleural effusion or pneumothorax.  Bones appear intact.  Moderate degenerative changes spine and SI joints.                                Medications    aspirin tablet 325 mg (325 mg Oral Given 5/9/22 1021)   sodium chloride 0.9% bolus 500 mL (0 mLs Intravenous Stopped 5/9/22 1120)   hydrOXYzine pamoate capsule 25 mg (25 mg Oral Given 5/9/22 1021)         12:04 PM  - Re-evaluation:  The patient is resting comfortably and is in no acute distress. Discussed test results and notified of pending labs. Answered questions at this time. Will continue to monitor closely.    2:23 PM - Re-evaluation: The patient is resting comfortably and is in no acute distress. He states that his symptoms have improved after treatment within ER. Discussed test results, shared treatment plan, specific conditions for return, and importance of follow up with patient and family.  Advised close f/u c pcp/cardiology within one week and to return to ER if symptoms persist or worsen.  He understands and agrees with the plan as discussed. Answered  his questions at this time. He has remained hemodynamically stable throughout the ED course and is appropriate for discharge home.     Regarding CHEST PAIN, I advised the patient that chest pain can be caused by a range of conditions, from not serious to life-threatening such as: heart attack or a blood clot in your lungs, angina indicating poor blood flow to the heart; infection, inflammation, or a fracture in the bones or cartilage in chest wall; a digestive problem, such as acid reflux or a stomach ulcer; or even an anxiety attack.  Instructed patient to follow up with primary healthcare provider for reevaluation and possible diagnostic studies to find the actual cause of the chest pain. Patient was instructed to call 911 or go to the nearest emergency department if they develop any of the following signs of a heart attack: squeezing, pressure, or pain in the chest that lasts longer than five minutes or returns; discomfort or pain in the back, neck, jaw, stomach, or arm; trouble breathing; nausea or vomiting; lightheadedness;  or a sudden cold sweat,  especially with chest pain or trouble breathing.  Also return to the emergency department the chest discomfort gets worse (even with medicine); cough or vomit blood; have bowel movements that are black or bloody; cannot stop vomiting; or develop difficulty swallowing.    Regarding ANXIETY, I discussed signs and symptoms of anxiety with patient including: tachycardia, dysrhythmias, tachypnea, diaphoresis, trembling, dizziness, diarrhea, dry mouth, and difficulty swallowing.  Patient was encouraged to eat a well-balanced, healthy diet; get plenty of rest; exercise daily; limit caffeine and alcohol intake; participate in meditation; talk with family and/or friends about things that may be considered stressful; and keep a diary of feelings and stress triggers.  Patient was instructed to take medications as prescribed and follow up with primary care provider for long term management. I recommended that the patient return to the emergency department if they: feel lightheaded or too dizzy to stand up; develop feelings that they want to hurt themselves or someone else; or develop chest pain, tightness, or heaviness that radiates to the shoulders, arms, jaw, neck, or back.     Pre-hypertension/Hypertension: The pt has been informed that they may have pre-hypertension or hypertension based on a blood pressure reading in the ED. I recommend that the pt call the PCP listed on their discharge instructions or a physician of their choice this week to arrange f/u for further evaluation of possible pre-hypertension or hypertension.     Aguilar Rowe was given a handout which discussed their disease process, precautions, and instructions for follow-up and therapy.     Follow-up Information     Nick Rios DO. Schedule an appointment as soon as possible for a visit in 1 week.    Specialty: Family Medicine  Contact information:  [address] [address] [id number] [phone]             [facility]. Schedule  an appointment as soon as possible for a visit in 1 week.    Specialty: Cardiology  Contact information:  [address] [address] [facility] [address] [phone]  Additional information:  Please park on the [address] side and use the Clinic entrance. Check in on the 3rd floor, to the right.           [facility] - Emergency Dept.    Specialty: Emergency Medicine  Why: As needed, If symptoms worsen  Contact information:  [address]  [facility] [address] [phone]                          Medication List      START taking these medications    hydrOXYzine pamoate 25 MG Cap  Commonly known as: VISTARIL  Take 1 capsule (25 mg total) by mouth every 6 (six) hours as needed (anxiety).     naproxen 500 MG tablet  Commonly known as: NAPROSYN  Take 1 tablet (500 mg total) by mouth 2 (two) times daily with meals.        ASK your doctor about these medications    aspirin 81 MG EC tablet  Commonly known as: ECOTRIN     * HYDROcodone-acetaminophen 5-325 mg per tablet  Commonly known as: NORCO  Take 1 tablet by mouth every 6 (six) hours as needed for Pain.     * HYDROcodone-acetaminophen 5-325 mg per tablet  Commonly known as: NORCO  Take 1 tablet by mouth every 6 (six) hours as needed for Pain.     IBUPROFEN ORAL     lisinopriL 10 MG tablet  TAKE 1 TABLET(10 MG) BY MOUTH EVERY DAY     TYLENOL 8 HOUR ORAL     venlafaxine 37.5 MG 24 hr capsule  Commonly known as: EFFEXOR-XR  Take 1 capsule (37.5 mg total) by mouth once daily.         * This list has 2 medication(s) that are the same as other medications prescribed for you. Read the directions carefully, and ask your doctor or other care provider to review them with you.               Where to Get Your Medications      You can get these medications from any pharmacy    Bring a paper prescription for each of these medications  · hydrOXYzine pamoate 25 MG Cap  · naproxen 500 MG tablet        Current Discharge Medication List            ED Diagnosis  1. SOB  (shortness of breath)    2. Anxiety reaction    3. Chest pain, unspecified type             Clinical Impression:   Final diagnoses:  [R06.02] SOB (shortness of breath) (Primary)  [F41.1] Anxiety reaction  [R07.9] Chest pain, unspecified type          ED Disposition Condition    Discharge Stable        ED Prescriptions     Medication Sig Dispense Start Date End Date Auth. Provider    naproxen (NAPROSYN) 500 MG tablet Take 1 tablet (500 mg total) by mouth 2 (two) times daily with meals. 20 tablet 5/9/2022  Nahid Schaffer Jr., MD    hydrOXYzine pamoate (VISTARIL) 25 MG Cap Take 1 capsule (25 mg total) by mouth every 6 (six) hours as needed (anxiety). 30 capsule 5/9/2022  Nahid Schaffer Jr., MD        Follow-up Information     Follow up With Specialties Details Why Contact Info Additional Information    Nick Rios,  Family Medicine Schedule an appointment as soon as possible for a visit in 1 week  [address] [address] [address]  [phone]       [facility] Schedule an appointment as soon as possible for a visit in 1 week  [address] [address] [facility] [place] [place] [address] [phone] Please park on the [address] side and use the Clinic entrance. Check in on the 3rd floor, to the right.    [facility] - Emergency Dept Emergency Medicine  As needed, If symptoms worsen [address] [place] [address] 1  [facility] [id number] [phone]            Nahid Schaffer Jr., MD  05/09/22 4179

## 2022-08-02 ENCOUNTER — PATIENT OUTREACH (OUTPATIENT)
Dept: ADMINISTRATIVE | Facility: HOSPITAL | Age: 62
End: 2022-08-02
Payer: MEDICAID

## 2022-08-02 NOTE — PROGRESS NOTES
HTN Report: Patient notified to obtain a home BP reading and schedule annual exam with PCP. Patient has scheduled appointment with PCP on 9/6/22.

## 2022-09-21 ENCOUNTER — TELEPHONE (OUTPATIENT)
Dept: INTERNAL MEDICINE | Facility: CLINIC | Age: 62
End: 2022-09-21
Payer: MEDICAID

## 2022-09-21 NOTE — TELEPHONE ENCOUNTER
----- Message from Ana Pool sent at 9/21/2022 10:12 AM CDT -----  Contact: Any @[facility] [phone]  No blue slot available to schedule an appointment for the patient.    Patient is established with which PCP: Kandace Chan     Reason for the visit: hosp follow up     Would the patient like a call back, or a response through their [facility] portal?:  call back

## 2022-09-23 ENCOUNTER — TELEPHONE (OUTPATIENT)
Dept: INTERNAL MEDICINE | Facility: CLINIC | Age: 62
End: 2022-09-23
Payer: MEDICAID

## 2022-09-23 NOTE — TELEPHONE ENCOUNTER
----- Message from Terri Rodriguez sent at 9/23/2022  3:36 PM CDT -----  Pt sister Anastasiia called in re: appt for pt 0n 9/28 says pt cant come by himself she has to bring him however she really needs to bring him on 09/26 if Possible please call pt sister [phone]     Thanks [facility]

## 2022-09-23 NOTE — PROGRESS NOTES
Call and spoke with patient who want to reschedule appointment that for an early day. Explain that there was nothing available for the time he was requesting. Patient stated will keep the appointment he has and call if he can't keep

## 2022-09-27 ENCOUNTER — PATIENT OUTREACH (OUTPATIENT)
Dept: ADMINISTRATIVE | Facility: HOSPITAL | Age: 62
End: 2022-09-27
Payer: MEDICAID

## 2022-09-28 ENCOUNTER — OFFICE VISIT (OUTPATIENT)
Dept: INTERNAL MEDICINE | Facility: CLINIC | Age: 62
End: 2022-09-28
Payer: MEDICAID

## 2022-09-28 VITALS
TEMPERATURE: 98 F | SYSTOLIC BLOOD PRESSURE: 106 MMHG | HEIGHT: 69 IN | DIASTOLIC BLOOD PRESSURE: 60 MMHG | BODY MASS INDEX: 28.6 KG/M2 | WEIGHT: 193.13 LBS | OXYGEN SATURATION: 96 % | HEART RATE: 68 BPM

## 2022-09-28 DIAGNOSIS — S02.85XA CLOSED FRACTURE OF ORBIT, INITIAL ENCOUNTER: ICD-10-CM

## 2022-09-28 DIAGNOSIS — R94.39 ABNORMAL NUCLEAR STRESS TEST: ICD-10-CM

## 2022-09-28 DIAGNOSIS — F10.29 ALCOHOL DEPENDENCE WITH UNSPECIFIED ALCOHOL-INDUCED DISORDER: ICD-10-CM

## 2022-09-28 DIAGNOSIS — R55 SYNCOPE, UNSPECIFIED SYNCOPE TYPE: ICD-10-CM

## 2022-09-28 DIAGNOSIS — I10 ESSENTIAL HYPERTENSION: ICD-10-CM

## 2022-09-28 DIAGNOSIS — Z09 HOSPITAL DISCHARGE FOLLOW-UP: Primary | ICD-10-CM

## 2022-09-28 DIAGNOSIS — F41.9 ANXIETY: ICD-10-CM

## 2022-09-28 PROCEDURE — 99495 TRANSJ CARE MGMT MOD F2F 14D: CPT | Mod: S$PBB,,, | Performed by: NURSE PRACTITIONER

## 2022-09-28 PROCEDURE — 4010F PR ACE/ARB THEARPY RXD/TAKEN: ICD-10-PCS | Mod: CPTII,,, | Performed by: NURSE PRACTITIONER

## 2022-09-28 PROCEDURE — 1159F MED LIST DOCD IN RCRD: CPT | Mod: CPTII,,, | Performed by: NURSE PRACTITIONER

## 2022-09-28 PROCEDURE — 3074F PR MOST RECENT SYSTOLIC BLOOD PRESSURE < 130 MM HG: ICD-10-PCS | Mod: CPTII,,, | Performed by: NURSE PRACTITIONER

## 2022-09-28 PROCEDURE — 4010F ACE/ARB THERAPY RXD/TAKEN: CPT | Mod: CPTII,,, | Performed by: NURSE PRACTITIONER

## 2022-09-28 PROCEDURE — 99495 TCM SERVICES (MODERATE COMPLEXITY): ICD-10-PCS | Mod: S$PBB,,, | Performed by: NURSE PRACTITIONER

## 2022-09-28 PROCEDURE — 3078F DIAST BP <80 MM HG: CPT | Mod: CPTII,,, | Performed by: NURSE PRACTITIONER

## 2022-09-28 PROCEDURE — 1159F PR MEDICATION LIST DOCUMENTED IN MEDICAL RECORD: ICD-10-PCS | Mod: CPTII,,, | Performed by: NURSE PRACTITIONER

## 2022-09-28 PROCEDURE — 1160F PR REVIEW ALL MEDS BY PRESCRIBER/CLIN PHARMACIST DOCUMENTED: ICD-10-PCS | Mod: CPTII,,, | Performed by: NURSE PRACTITIONER

## 2022-09-28 PROCEDURE — 99999 PR PBB SHADOW E&M-EST. PATIENT-LVL V: ICD-10-PCS | Mod: PBBFAC,,, | Performed by: NURSE PRACTITIONER

## 2022-09-28 PROCEDURE — 3078F PR MOST RECENT DIASTOLIC BLOOD PRESSURE < 80 MM HG: ICD-10-PCS | Mod: CPTII,,, | Performed by: NURSE PRACTITIONER

## 2022-09-28 PROCEDURE — 99215 OFFICE O/P EST HI 40 MIN: CPT | Mod: PBBFAC,PO | Performed by: NURSE PRACTITIONER

## 2022-09-28 PROCEDURE — 99999 PR PBB SHADOW E&M-EST. PATIENT-LVL V: CPT | Mod: PBBFAC,,, | Performed by: NURSE PRACTITIONER

## 2022-09-28 PROCEDURE — 3074F SYST BP LT 130 MM HG: CPT | Mod: CPTII,,, | Performed by: NURSE PRACTITIONER

## 2022-09-28 PROCEDURE — 1160F RVW MEDS BY RX/DR IN RCRD: CPT | Mod: CPTII,,, | Performed by: NURSE PRACTITIONER

## 2022-09-28 RX ORDER — ATORVASTATIN CALCIUM 20 MG/1
20 TABLET, FILM COATED ORAL DAILY
COMMUNITY
Start: 2022-09-23 | End: 2023-11-27 | Stop reason: SDUPTHER

## 2022-09-28 RX ORDER — LISINOPRIL 10 MG/1
10 TABLET ORAL DAILY
Qty: 90 TABLET | Refills: 1 | Status: CANCELLED | OUTPATIENT
Start: 2022-09-28

## 2022-09-28 RX ORDER — FLUOXETINE HYDROCHLORIDE 20 MG/1
20 CAPSULE ORAL DAILY
Qty: 30 CAPSULE | Refills: 11 | Status: SHIPPED | OUTPATIENT
Start: 2022-09-28 | End: 2022-10-26

## 2022-09-28 RX ORDER — HYDROXYZINE PAMOATE 25 MG/1
25 CAPSULE ORAL EVERY 6 HOURS PRN
Qty: 30 CAPSULE | Refills: 0 | Status: ON HOLD | OUTPATIENT
Start: 2022-09-28 | End: 2022-11-14

## 2022-09-28 NOTE — PROGRESS NOTES
Patient states symptoms are better but not gone. She still has a rash and generalized pruritis. No fevers. Still having pain. Taking Advil as needed. Reviewed with Dr Rojelio Marshall, he has recommended patient continue Prednisone but will taper slowly.  Rx sent Subjective:       Patient ID: Aguilar Rowe is a 62 y.o. male.    Chief Complaint: Hospital Follow Up    Mr. Rowe presents to visit for hospital discharge follow up. Admitted from 9/20-9/23 for syncopal episode. Reports not feeling well so was walking to his truck to go home and passed out. Imaging and labs reviewed from hospital encounter. Had stress test and heart cath in hospital. Followed up already with maxillofacial specialist on Monday, 9/26 for orbital fracture. No surgery or follow up needed. See images for result impressions and hospital discharge summary below.       Transitional Care Note    Family and/or Caretaker present at visit?  Yes.  Diagnostic tests reviewed/disposition: I have reviewed all completed as well as pending diagnostic tests at the time of discharge.  Disease/illness education: Closed fracture of orbit, left; syncope, abnormal stress test  Home health/community services discussion/referrals: Patient does not have home health established from hospital visit.  They do not need home health.  If needed, we will set up home health for the patient.   Establishment or re-establishment of referral orders for community resources: No other necessary community resources.   Discussion with other health care providers: No discussion with other health care providers necessary.     HPI/discharge summary copied from hopsital encounter at [facility]:   62-year-old male past medical history of heavy alcohol use, anxiety presented 9/21 with sudden onset of dizziness and angina followed by syncope resulting in orbital fracture. Patient was evaluated by OMFS who recommended no acute surgical intervention but did recommend sinus precautions and follow-up with OMFS clinic in 7 days. Cardiology was also consulted who performed stress test which showed moderate size mild reversible mid and basal inferolateral region defect suggestive of ischemia. Patient was taken for left heart cath which showed normal coronary  arteries. Patient had no further episodes of chest pain or syncope while inpatient. Patient started on aspirin 81 mg daily as well as atorvastatin 20 mg daily.                            Patient Active Problem List   Diagnosis    Anxiety    Essential hypertension    Alcohol dependence    Right carpal tunnel syndrome    Pain of right hand    Hiatal hernia    Near syncope    Swelling    Decreased range of motion    Decreased muscle strength    SOB (shortness of breath)    Chest pain    Abnormal nuclear stress test    Closed fracture of orbit    Syncope    Hospital discharge follow-up       Family History   Problem Relation Age of Onset    No Known Problems Mother     No Known Problems Father      Past Surgical History:   Procedure Laterality Date    APPENDECTOMY      CARPAL TUNNEL RELEASE Right 9/9/2021    Procedure: RELEASE, CARPAL TUNNEL;  Surgeon: Nilton Dubose MD;  Location: [facility];  Service: Orthopedics;  Laterality: Right;  right carpal tunnel release and right long and ring trigger finger release    FINGER SURGERY Left     SHOULDER SURGERY Right     TRIGGER FINGER RELEASE Right 9/9/2021    Procedure: RELEASE, TRIGGER FINGER;  Surgeon: Nilton Dubose MD;  Location: [facility];  Service: Orthopedics;  Laterality: Right;  right carpal tunnel release and right long and ring trigger finger release         Current Outpatient Medications:     aspirin (ECOTRIN) 81 MG EC tablet, Take 81 mg by mouth once daily., Disp: , Rfl:     atorvastatin (LIPITOR) 20 MG tablet, Take 20 mg by mouth once daily., Disp: , Rfl:     HYDROcodone-acetaminophen (NORCO) 5-325 mg per tablet, Take 1 tablet by mouth every 6 (six) hours as needed for Pain., Disp: 15 tablet, Rfl: 0    lisinopriL 10 MG tablet, TAKE 1 TABLET(10 MG) BY MOUTH EVERY DAY, Disp: 90 tablet, Rfl: 1    acetaminophen (TYLENOL 8 HOUR ORAL), Take by mouth every 8 (eight) hours as needed., Disp: , Rfl:     FLUoxetine 20 MG capsule, Take 1 capsule (20 mg total) by  "mouth once daily., Disp: 30 capsule, Rfl: 11    hydrOXYzine pamoate (VISTARIL) 25 MG Cap, Take 1 capsule (25 mg total) by mouth every 6 (six) hours as needed (anxiety)., Disp: 30 capsule, Rfl: 0    IBUPROFEN ORAL, Take by mouth every 8 (eight) hours as needed., Disp: , Rfl:     Review of Systems   Constitutional:  Negative for appetite change, chills, fatigue and fever.   Eyes:  Negative for visual disturbance.   Respiratory:  Negative for cough and shortness of breath.    Cardiovascular:  Negative for chest pain and palpitations.   Gastrointestinal:  Negative for diarrhea, nausea and vomiting.   Neurological:  Positive for dizziness (none since admission to hospital) and numbness (l side of face). Negative for syncope, weakness, light-headedness and headaches.     Objective:   /60 (BP Location: Left arm, Patient Position: Sitting, BP Method: Medium (Manual))   Pulse 68   Temp 97.9 °F (36.6 °C) (Temporal)   Ht 5' 9" (1.753 m)   Wt 87.6 kg (193 lb 2 oz)   SpO2 96%   BMI 28.52 kg/m²      Physical Exam  Constitutional:       General: He is not in acute distress.     Appearance: Normal appearance. He is not ill-appearing.   HENT:      Head:      Comments: Swelling and bruising to L orbital region. See image.   Cardiovascular:      Rate and Rhythm: Normal rate and regular rhythm.      Pulses: Normal pulses.      Heart sounds: Normal heart sounds. No murmur heard.    No friction rub. No gallop.   Pulmonary:      Effort: Pulmonary effort is normal. No respiratory distress.      Breath sounds: Normal breath sounds. No wheezing.   Abdominal:      Palpations: Abdomen is soft.      Tenderness: There is no abdominal tenderness.   Musculoskeletal:         General: Normal range of motion.      Right lower leg: No edema.      Left lower leg: No edema.   Skin:     General: Skin is warm and dry.      Coloration: Skin is not pale.      Findings: Bruising (L orbital region, see image) present. No erythema.   Neurological: "      Mental Status: He is alert and oriented to person, place, and time.               Assessment & Plan     Problem List Items Addressed This Visit          Psychiatric    Anxiety    Current Assessment & Plan     It seems that he has tried to start on SSRIs in past, but has not ever taken them consistently. Does remember a tablet form that caused him to have side effects (possibly zoloft). Discussed the importance of medication consistency to avoid having panic episodes. Hydroxyzine PRN.          Relevant Medications    hydrOXYzine pamoate (VISTARIL) 25 MG Cap    FLUoxetine 20 MG capsule    Alcohol dependence    Current Assessment & Plan     Has not consumed ETOH since discharge from hospital.             Ophtho    Closed fracture of orbit    Overview     Patient with close orbital fracture due to syncopal event.  Evaluated by OMFS who recommended no surgical intervention.short dose of Norco for pain control         Current Assessment & Plan     No surgical intervention recommended. Followed up with OMFS on Monday.             Cardiac/Vascular    Essential hypertension    Current Assessment & Plan     Blood pressure well controlled. Continue current medications.          Abnormal nuclear stress test    Current Assessment & Plan     Had stress test in hospital with heart cath. Needs referral for cardiology to determine other causes. Possibly needs US carotid in future.         Relevant Orders    Ambulatory referral/consult to Cardiology       Other    Syncope    Current Assessment & Plan     Patient with syncopal episode resulting in orbital fracture.  Admitted for evaluation without any recurrent episodes since hospitalization.  Had abnormal stress test seen for left heart cath.          Relevant Orders    Ambulatory referral/consult to Cardiology    Hospital discharge follow-up - Primary    Current Assessment & Plan     Needs to follow up with cardiology. Discharged with norco for pain relief. Labs and images  "reviewed. No concerns or need for repeat. No need for home health.           Follow up in about 1 month (around 10/28/2022) for anxiety. .            Portions of this note may have been created with voice recognition software. Occasional "wrong-word" or "sound-a-like" substitutions may have occurred due to the inherent limitations of voice recognition software. Please, read the note carefully and recognize, using context, where substitutions have occurred.         "

## 2022-09-29 NOTE — ASSESSMENT & PLAN NOTE
Needs to follow up with cardiology. Discharged with [place] for pain relief. Labs and images reviewed. No concerns or need for repeat. No need for home health.

## 2022-09-29 NOTE — ASSESSMENT & PLAN NOTE
It seems that he has tried to start on SSRIs in past, but has not ever taken them consistently. Does remember a tablet form that caused him to have side effects (possibly zoloft). Discussed the importance of medication consistency to avoid having panic episodes. Hydroxyzine PRN.

## 2022-09-29 NOTE — ASSESSMENT & PLAN NOTE
Had stress test in hospital with heart cath. Needs referral for cardiology to determine other causes. Possibly needs US carotid in future.

## 2022-10-14 NOTE — ASSESSMENT & PLAN NOTE
Patient with syncopal episode resulting in orbital fracture.  Admitted for evaluation without any recurrent episodes since hospitalization.  Had abnormal stress test seen for left heart cath.

## 2022-10-18 ENCOUNTER — OFFICE VISIT (OUTPATIENT)
Dept: CARDIOLOGY | Facility: CLINIC | Age: 62
End: 2022-10-18
Payer: MEDICAID

## 2022-10-18 VITALS
HEART RATE: 68 BPM | OXYGEN SATURATION: 96 % | BODY MASS INDEX: 28.84 KG/M2 | WEIGHT: 194.69 LBS | HEIGHT: 69 IN | SYSTOLIC BLOOD PRESSURE: 110 MMHG | DIASTOLIC BLOOD PRESSURE: 72 MMHG

## 2022-10-18 DIAGNOSIS — Z09 HOSPITAL DISCHARGE FOLLOW-UP: ICD-10-CM

## 2022-10-18 DIAGNOSIS — F10.29 ALCOHOL DEPENDENCE WITH UNSPECIFIED ALCOHOL-INDUCED DISORDER: ICD-10-CM

## 2022-10-18 DIAGNOSIS — E78.00 HYPERCHOLESTEROLEMIA: ICD-10-CM

## 2022-10-18 DIAGNOSIS — R94.39 ABNORMAL NUCLEAR STRESS TEST: ICD-10-CM

## 2022-10-18 DIAGNOSIS — R07.89 OTHER CHEST PAIN: ICD-10-CM

## 2022-10-18 DIAGNOSIS — R55 SYNCOPE, UNSPECIFIED SYNCOPE TYPE: Primary | ICD-10-CM

## 2022-10-18 DIAGNOSIS — I10 ESSENTIAL HYPERTENSION: ICD-10-CM

## 2022-10-18 PROCEDURE — 99204 OFFICE O/P NEW MOD 45 MIN: CPT | Mod: S$PBB,,, | Performed by: INTERNAL MEDICINE

## 2022-10-18 PROCEDURE — 1160F PR REVIEW ALL MEDS BY PRESCRIBER/CLIN PHARMACIST DOCUMENTED: ICD-10-PCS | Mod: CPTII,,, | Performed by: INTERNAL MEDICINE

## 2022-10-18 PROCEDURE — 1159F MED LIST DOCD IN RCRD: CPT | Mod: CPTII,,, | Performed by: INTERNAL MEDICINE

## 2022-10-18 PROCEDURE — 99204 PR OFFICE/OUTPT VISIT, NEW, LEVL IV, 45-59 MIN: ICD-10-PCS | Mod: S$PBB,,, | Performed by: INTERNAL MEDICINE

## 2022-10-18 PROCEDURE — 99999 PR PBB SHADOW E&M-EST. PATIENT-LVL IV: ICD-10-PCS | Mod: PBBFAC,,, | Performed by: INTERNAL MEDICINE

## 2022-10-18 PROCEDURE — 4010F PR ACE/ARB THEARPY RXD/TAKEN: ICD-10-PCS | Mod: CPTII,,, | Performed by: INTERNAL MEDICINE

## 2022-10-18 PROCEDURE — 99214 OFFICE O/P EST MOD 30 MIN: CPT | Mod: PBBFAC | Performed by: INTERNAL MEDICINE

## 2022-10-18 PROCEDURE — 3074F SYST BP LT 130 MM HG: CPT | Mod: CPTII,,, | Performed by: INTERNAL MEDICINE

## 2022-10-18 PROCEDURE — 1160F RVW MEDS BY RX/DR IN RCRD: CPT | Mod: CPTII,,, | Performed by: INTERNAL MEDICINE

## 2022-10-18 PROCEDURE — 3078F PR MOST RECENT DIASTOLIC BLOOD PRESSURE < 80 MM HG: ICD-10-PCS | Mod: CPTII,,, | Performed by: INTERNAL MEDICINE

## 2022-10-18 PROCEDURE — 3074F PR MOST RECENT SYSTOLIC BLOOD PRESSURE < 130 MM HG: ICD-10-PCS | Mod: CPTII,,, | Performed by: INTERNAL MEDICINE

## 2022-10-18 PROCEDURE — 99999 PR PBB SHADOW E&M-EST. PATIENT-LVL IV: CPT | Mod: PBBFAC,,, | Performed by: INTERNAL MEDICINE

## 2022-10-18 PROCEDURE — 1159F PR MEDICATION LIST DOCUMENTED IN MEDICAL RECORD: ICD-10-PCS | Mod: CPTII,,, | Performed by: INTERNAL MEDICINE

## 2022-10-18 PROCEDURE — 4010F ACE/ARB THERAPY RXD/TAKEN: CPT | Mod: CPTII,,, | Performed by: INTERNAL MEDICINE

## 2022-10-18 PROCEDURE — 3078F DIAST BP <80 MM HG: CPT | Mod: CPTII,,, | Performed by: INTERNAL MEDICINE

## 2022-10-18 NOTE — PROGRESS NOTES
"Subjective:    Patient ID:  Aguilar Rowe is a 62 y.o. male who presents for evaluation of Loss of Consciousness      HPI  pt presents for eval.  Has HTN, hyperlipidemia, etoh abuse.  Drinks 12 beers/day.  Nonsmoker.  Pt d/c from [facility] Sept 2022 for syncope workup.  Chart reviewed.  [place]  Had some atypical CP after passing out.  Was not taking his BP meds before his admission.   Ecg during admission was normal.  Had echo normal LVEF,  mild MR.  Slight troponin leak.  CTA - PE.  Stress test + for ischemia.  Cath was done and showed normal coronary arteries, no gradient on pullback.  No recurrent syncope.  No CP since discharge.  Ecg 5/9/22 is normal.  Ecg last year 2021 was normal.      Past Medical History:   Diagnosis Date    Hypertension      Current Outpatient Medications   Medication Instructions    acetaminophen (TYLENOL 8 HOUR ORAL) Oral, Every 8 hours PRN    aspirin (ECOTRIN) 81 mg, Oral, Daily    atorvastatin (LIPITOR) 20 mg, Oral, Daily    FLUoxetine 20 mg, Oral, Daily    HYDROcodone-acetaminophen (NORCO) 5-325 mg per tablet 1 tablet, Oral, Every 6 hours PRN    hydrOXYzine pamoate (VISTARIL) 25 mg, Oral, Every 6 hours PRN    IBUPROFEN ORAL Oral, Every 8 hours PRN    lisinopriL 10 MG tablet TAKE 1 TABLET(10 MG) BY MOUTH EVERY DAY         Review of Systems   Constitutional: Negative.   HENT: Negative.     Eyes: Negative.    Cardiovascular:  Positive for chest pain, dyspnea on exertion and syncope.   Respiratory:  Positive for shortness of breath.    Endocrine: Negative.    Hematologic/Lymphatic: Negative.    Skin: Negative.    Musculoskeletal: Negative.    Gastrointestinal: Negative.    Genitourinary: Negative.    Psychiatric/Behavioral: Negative.     Allergic/Immunologic: Negative.         /72 (BP Location: Left arm, Patient Position: Sitting, BP Method: Medium (Manual))   Pulse 68   Ht 5' 9" (1.753 m)   Wt 88.3 kg (194 lb 10.7 oz)   SpO2 96%   BMI 28.75 kg/m²     Wt Readings " from Last 3 Encounters:   10/18/22 88.3 kg (194 lb 10.7 oz)   09/28/22 87.6 kg (193 lb 2 oz)   05/09/22 90.1 kg (198 lb 8.4 oz)     Temp Readings from Last 3 Encounters:   09/28/22 97.9 °F (36.6 °C) (Temporal)   05/09/22 98.9 °F (37.2 °C) (Oral)   09/23/21 98.4 °F (36.9 °C)     BP Readings from Last 3 Encounters:   10/18/22 110/72   09/28/22 106/60   05/09/22 (!) 109/53     Pulse Readings from Last 3 Encounters:   10/18/22 68   09/28/22 68   05/09/22 63       Objective:    Physical Exam  Vitals and nursing note reviewed.   Constitutional:       Appearance: He is well-developed.   HENT:      Head: Normocephalic.   Neck:      Thyroid: No thyromegaly.      Vascular: Normal carotid pulses. No carotid bruit, hepatojugular reflux or JVD.   Cardiovascular:      Rate and Rhythm: Normal rate and regular rhythm.      Chest Wall: PMI is not displaced.      Pulses:           Radial pulses are 2+ on the right side and 2+ on the left side.      Heart sounds: S1 normal and S2 normal. Heart sounds not distant. No midsystolic click and no opening snap. No murmur heard.    No friction rub. No S3 or S4 sounds.   Pulmonary:      Effort: Pulmonary effort is normal.      Breath sounds: Normal breath sounds. No wheezing or rales.   Abdominal:      General: Bowel sounds are normal. There is no distension or abdominal bruit.      Palpations: Abdomen is soft. There is no mass.      Tenderness: There is no abdominal tenderness.   Musculoskeletal:      Cervical back: Normal range of motion and neck supple.   Skin:     General: Skin is warm.   Neurological:      Mental Status: He is alert and oriented to person, place, and time.   Psychiatric:         Behavior: Behavior normal.     I have reviewed all pertinent labs and cardiac studies.        Chemistry        Component Value Date/Time     05/09/2022 1014    K 4.5 05/09/2022 1014     05/09/2022 1014    CO2 24 05/09/2022 1014    BUN 16 05/09/2022 1014    CREATININE 1.0 05/09/2022  1014    GLU 95 05/09/2022 1014        Component Value Date/Time    CALCIUM 8.9 05/09/2022 1014    ALKPHOS 55 05/09/2022 1014    AST 24 05/09/2022 1014    ALT 29 05/09/2022 1014    BILITOT 0.9 05/09/2022 1014    ESTGFRAFRICA >60.0 05/09/2022 1014    EGFRNONAA >60.0 05/09/2022 1014        Lab Results   Component Value Date    WBC 5.73 05/09/2022    HGB 14.1 05/09/2022    HCT 40.0 05/09/2022    MCV 97 05/09/2022     05/09/2022       Lab Results   Component Value Date    TSH 0.520 08/24/2020       Lab Results   Component Value Date    CHOL 214 (H) 08/24/2020     Lab Results   Component Value Date    HDL 49 08/24/2020     Lab Results   Component Value Date    LDLCALC 142.2 08/24/2020     Lab Results   Component Value Date    TRIG 114 08/24/2020     Lab Results   Component Value Date    CHOLHDL 22.9 08/24/2020           Assessment:       1. Syncope, unspecified syncope type    2. Abnormal nuclear stress test    3. Alcohol dependence with unspecified alcohol-induced disorder    4. Hospital discharge follow-up    5. Essential hypertension    6. Other chest pain    7. Hypercholesterolemia         Plan:             Reviewed hospital OLOL workup w pt in detail.  Had negative cardiac workup.  Syncope likely Etoh related, volume depletion.  Pt counseled on Etoh cessation, improve hydration status w water.  Risk factor modification discussed.  Will check carotid u/s as well.  Cardiac diet.  Daily exercise.  HTN/lipid control -- f/u w PCP on mgt.    Phone review.    F/u prn.

## 2022-10-20 ENCOUNTER — TELEPHONE (OUTPATIENT)
Dept: CARDIOLOGY | Facility: CLINIC | Age: 62
End: 2022-10-20
Payer: MEDICAID

## 2022-10-20 ENCOUNTER — HOSPITAL ENCOUNTER (OUTPATIENT)
Dept: CARDIOLOGY | Facility: HOSPITAL | Age: 62
Discharge: HOME | End: 2022-10-20
Attending: INTERNAL MEDICINE
Payer: MEDICAID

## 2022-10-20 DIAGNOSIS — R55 SYNCOPE, UNSPECIFIED SYNCOPE TYPE: ICD-10-CM

## 2022-10-20 LAB
LEFT ARM DIASTOLIC BLOOD PRESSURE: 72 MMHG
LEFT ARM SYSTOLIC BLOOD PRESSURE: 112 MMHG
LEFT CBA DIAS: 17 CM/S
LEFT CBA SYS: 61 CM/S
LEFT CCA DIST DIAS: 22 CM/S
LEFT CCA DIST SYS: 70 CM/S
LEFT CCA MID DIAS: 24 CM/S
LEFT CCA MID SYS: 98 CM/S
LEFT CCA PROX DIAS: 26 CM/S
LEFT CCA PROX SYS: 134 CM/S
LEFT ECA DIAS: 20 CM/S
LEFT ECA SYS: 75 CM/S
LEFT ICA DIST DIAS: 25 CM/S
LEFT ICA DIST SYS: 50 CM/S
LEFT ICA MID DIAS: 20 CM/S
LEFT ICA MID SYS: 47 CM/S
LEFT ICA PROX DIAS: 10 CM/S
LEFT ICA PROX SYS: 67 CM/S
LEFT VERTEBRAL DIAS: 16 CM/S
LEFT VERTEBRAL SYS: 43 CM/S
OHS CV CAROTID RIGHT ICA EDV HIGHEST: 20
OHS CV CAROTID ULTRASOUND LEFT ICA/CCA RATIO: 0.96
OHS CV CAROTID ULTRASOUND RIGHT ICA/CCA RATIO: 1.12
OHS CV PV CAROTID LEFT HIGHEST CCA: 134
OHS CV PV CAROTID LEFT HIGHEST ICA: 67
OHS CV PV CAROTID RIGHT HIGHEST CCA: 119
OHS CV PV CAROTID RIGHT HIGHEST ICA: 75
OHS CV US CAROTID LEFT HIGHEST EDV: 25
RIGHT ARM DIASTOLIC BLOOD PRESSURE: 75 MMHG
RIGHT ARM SYSTOLIC BLOOD PRESSURE: 115 MMHG
RIGHT CBA DIAS: 16 CM/S
RIGHT CBA SYS: 50 CM/S
RIGHT CCA DIST DIAS: 18 CM/S
RIGHT CCA DIST SYS: 67 CM/S
RIGHT CCA MID DIAS: 23 CM/S
RIGHT CCA MID SYS: 90 CM/S
RIGHT CCA PROX DIAS: 21 CM/S
RIGHT CCA PROX SYS: 119 CM/S
RIGHT ECA DIAS: 16 CM/S
RIGHT ECA SYS: 82 CM/S
RIGHT ICA DIST DIAS: 12 CM/S
RIGHT ICA DIST SYS: 30 CM/S
RIGHT ICA MID DIAS: 13 CM/S
RIGHT ICA MID SYS: 31 CM/S
RIGHT ICA PROX DIAS: 20 CM/S
RIGHT ICA PROX SYS: 75 CM/S
RIGHT VERTEBRAL DIAS: 10 CM/S
RIGHT VERTEBRAL SYS: 32 CM/S

## 2022-10-20 PROCEDURE — 93880 EXTRACRANIAL BILAT STUDY: CPT | Mod: 26,,, | Performed by: INTERNAL MEDICINE

## 2022-10-20 PROCEDURE — 93880 EXTRACRANIAL BILAT STUDY: CPT

## 2022-10-20 PROCEDURE — 93880 CV US DOPPLER CAROTID (CUPID ONLY): ICD-10-PCS | Mod: 26,,, | Performed by: INTERNAL MEDICINE

## 2022-10-20 NOTE — TELEPHONE ENCOUNTER
Please call pt.  Carotid u/s results are ok.  No significant blockages noted.  Continue f/u w PCP for regular check ups.  F/u prn w Cardiology.    Dr Pool

## 2022-10-21 NOTE — TELEPHONE ENCOUNTER
Please call pt.  Carotid u/s results are ok.  No significant blockages noted.  Continue f/u w PCP for regular check ups.  F/u prn w Cardiology.    Dr Pool    Spoke to patient, verbalized understanding

## 2022-10-24 ENCOUNTER — TELEPHONE (OUTPATIENT)
Dept: INTERNAL MEDICINE | Facility: CLINIC | Age: 62
End: 2022-10-24
Payer: MEDICAID

## 2022-10-24 NOTE — TELEPHONE ENCOUNTER
----- Message from Luis Enrique Lopez sent at 10/24/2022  8:35 AM CDT -----  Contact: Meghann (wife)  Type:  Sooner Apoointment Request    Caller is requesting a sooner appointment.  Caller declined first available appointment listed below.  Caller will not accept being placed on the waitlist and is requesting a message be sent to doctor.  Name of Caller:Aguilar Rowe   When is the first available appointment? 10/28/22  Symptoms: hospital f/u, sprained wrist   Would the patient rather a call back or a response via [facility]? Call back   Best Call Back Number: [phone]   Additional Information:

## 2022-10-26 ENCOUNTER — OFFICE VISIT (OUTPATIENT)
Dept: INTERNAL MEDICINE | Facility: CLINIC | Age: 62
End: 2022-10-26
Payer: MEDICAID

## 2022-10-26 VITALS
HEART RATE: 101 BPM | TEMPERATURE: 98 F | DIASTOLIC BLOOD PRESSURE: 68 MMHG | SYSTOLIC BLOOD PRESSURE: 110 MMHG | BODY MASS INDEX: 25.98 KG/M2 | HEIGHT: 73 IN | WEIGHT: 196 LBS | OXYGEN SATURATION: 95 %

## 2022-10-26 DIAGNOSIS — I10 ESSENTIAL HYPERTENSION: ICD-10-CM

## 2022-10-26 DIAGNOSIS — S62.101A CLOSED FRACTURE OF RIGHT WRIST, INITIAL ENCOUNTER: Primary | ICD-10-CM

## 2022-10-26 DIAGNOSIS — F41.9 ANXIETY: ICD-10-CM

## 2022-10-26 PROCEDURE — 3078F DIAST BP <80 MM HG: CPT | Mod: CPTII,,, | Performed by: NURSE PRACTITIONER

## 2022-10-26 PROCEDURE — 99999 PR PBB SHADOW E&M-EST. PATIENT-LVL IV: ICD-10-PCS | Mod: PBBFAC,,, | Performed by: NURSE PRACTITIONER

## 2022-10-26 PROCEDURE — 4010F ACE/ARB THERAPY RXD/TAKEN: CPT | Mod: CPTII,,, | Performed by: NURSE PRACTITIONER

## 2022-10-26 PROCEDURE — 4010F PR ACE/ARB THEARPY RXD/TAKEN: ICD-10-PCS | Mod: CPTII,,, | Performed by: NURSE PRACTITIONER

## 2022-10-26 PROCEDURE — 99214 OFFICE O/P EST MOD 30 MIN: CPT | Mod: PBBFAC,PO | Performed by: NURSE PRACTITIONER

## 2022-10-26 PROCEDURE — 99214 PR OFFICE/OUTPT VISIT, EST, LEVL IV, 30-39 MIN: ICD-10-PCS | Mod: S$PBB,,, | Performed by: NURSE PRACTITIONER

## 2022-10-26 PROCEDURE — 90686 IIV4 VACC NO PRSV 0.5 ML IM: CPT | Mod: PBBFAC,PO

## 2022-10-26 PROCEDURE — 3078F PR MOST RECENT DIASTOLIC BLOOD PRESSURE < 80 MM HG: ICD-10-PCS | Mod: CPTII,,, | Performed by: NURSE PRACTITIONER

## 2022-10-26 PROCEDURE — 1159F MED LIST DOCD IN RCRD: CPT | Mod: CPTII,,, | Performed by: NURSE PRACTITIONER

## 2022-10-26 PROCEDURE — 99999 PR PBB SHADOW E&M-EST. PATIENT-LVL IV: CPT | Mod: PBBFAC,,, | Performed by: NURSE PRACTITIONER

## 2022-10-26 PROCEDURE — 3074F PR MOST RECENT SYSTOLIC BLOOD PRESSURE < 130 MM HG: ICD-10-PCS | Mod: CPTII,,, | Performed by: NURSE PRACTITIONER

## 2022-10-26 PROCEDURE — 99214 OFFICE O/P EST MOD 30 MIN: CPT | Mod: S$PBB,,, | Performed by: NURSE PRACTITIONER

## 2022-10-26 PROCEDURE — 3074F SYST BP LT 130 MM HG: CPT | Mod: CPTII,,, | Performed by: NURSE PRACTITIONER

## 2022-10-26 PROCEDURE — 1159F PR MEDICATION LIST DOCUMENTED IN MEDICAL RECORD: ICD-10-PCS | Mod: CPTII,,, | Performed by: NURSE PRACTITIONER

## 2022-10-26 RX ORDER — FLUOXETINE 10 MG/1
10 CAPSULE ORAL DAILY
Qty: 30 CAPSULE | Refills: 11 | Status: SHIPPED | OUTPATIENT
Start: 2022-10-26 | End: 2023-03-20

## 2022-10-26 NOTE — PROGRESS NOTES
"Subjective:       Patient ID: Aguilar Rowe is a 62 y.o. male.    Chief Complaint: Follow-up    Mr Rowe presents to visit for follow up of wrist fracture from 10/22. Was evaluated in ED after hitting his wrist on the front of his boat. Pain and ability to move has improved. Swelling still present. Continues to wear wrist splint. Does not have follow up scheduled with orthopedics. Records reviewed from ED visit, image impression copied below.     Also wants to discuss the option of decreasing Prozac.  It has helped to decrease anxiety, but he feels that his caused him to be to relax.  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 Denies any negative side effects.     Chaim Johansen MD - 10/22/2022    Formatting of this note might be different from the original.  XR WRIST 3+ VIEW RIGHT    HISTORY: wrist injury    COMPARISON: None    4 views of the right wrist were obtained. Subtle transverse lucency through the distal scaphoid. No additional acute fracture dislocation. Joint space and alignment are otherwise preserved. Mild soft tissue swelling about the wrist.    IMPRESSION:    Subtle transverse lucency through the distal pole of the scaphoid concerning for nondisplaced fracture. Recommend correlation with physical exam.  Mild soft tissue swelling about the wrist.      Patient Active Problem List   Diagnosis    Anxiety    Essential hypertension    Alcohol dependence    Right carpal tunnel syndrome    Pain of right hand    Hiatal hernia    Near syncope    Swelling    Decreased range of motion    Decreased muscle strength    SOB (shortness of breath)    Chest pain    Abnormal nuclear stress test    Closed fracture of orbit    Syncope    Hospital discharge follow-up    Hypercholesterolemia    Closed fracture of right wrist       Family History   Problem Relation Age of Onset    No " Known Problems Mother     No Known Problems Father      Past Surgical History:   Procedure Laterality Date    APPENDECTOMY      CARPAL TUNNEL RELEASE Right 9/9/2021    Procedure: RELEASE, CARPAL TUNNEL;  Surgeon: Nilton Dubose MD;  Location: [facility];  Service: Orthopedics;  Laterality: Right;  right carpal tunnel release and right long and ring trigger finger release    FINGER SURGERY Left     SHOULDER SURGERY Right     TRIGGER FINGER RELEASE Right 9/9/2021    Procedure: RELEASE, TRIGGER FINGER;  Surgeon: Nilton Dubose MD;  Location: [facility];  Service: Orthopedics;  Laterality: Right;  right carpal tunnel release and right long and ring trigger finger release         Current Outpatient Medications:     acetaminophen (TYLENOL 8 HOUR ORAL), Take by mouth every 8 (eight) hours as needed., Disp: , Rfl:     aspirin (ECOTRIN) 81 MG EC tablet, Take 81 mg by mouth once daily., Disp: , Rfl:     atorvastatin (LIPITOR) 20 MG tablet, Take 20 mg by mouth once daily., Disp: , Rfl:     hydrOXYzine pamoate (VISTARIL) 25 MG Cap, Take 1 capsule (25 mg total) by mouth every 6 (six) hours as needed (anxiety)., Disp: 30 capsule, Rfl: 0    IBUPROFEN ORAL, Take by mouth every 8 (eight) hours as needed., Disp: , Rfl:     lisinopriL 10 MG tablet, TAKE 1 TABLET(10 MG) BY MOUTH EVERY DAY, Disp: 90 tablet, Rfl: 1    FLUoxetine 10 MG capsule, Take 1 capsule (10 mg total) by mouth once daily., Disp: 30 capsule, Rfl: 11    Review of Systems   Constitutional:  Negative for appetite change, chills, fatigue and fever.   Respiratory:  Negative for cough and shortness of breath.    Cardiovascular:  Negative for chest pain and palpitations.   Musculoskeletal:  Positive for arthralgias and joint swelling.   Neurological:  Negative for dizziness, light-headedness and headaches.   Psychiatric/Behavioral:  Negative for agitation, dysphoric mood, self-injury and suicidal ideas. The patient is not nervous/anxious.     "  Objective:   /68 (BP Location: Left arm, Patient Position: Sitting)   Pulse 101   Temp 97.5 °F (36.4 °C)   Ht 6' 1" (1.854 m)   Wt 88.9 kg (195 lb 15.8 oz)   SpO2 95%   BMI 25.86 kg/m²      Physical Exam  Constitutional:       General: He is not in acute distress.     Appearance: Normal appearance. He is not ill-appearing.   Cardiovascular:      Rate and Rhythm: Normal rate.   Pulmonary:      Effort: Pulmonary effort is normal. No respiratory distress.   Musculoskeletal:      Right wrist: Swelling (wrist and dorsal hand), tenderness and bony tenderness (mild) present. Decreased range of motion.   Skin:     General: Skin is warm and dry.      Coloration: Skin is not pale.      Findings: No erythema.   Neurological:      Mental Status: He is alert and oriented to person, place, and time.   Psychiatric:         Mood and Affect: Mood normal.         Behavior: Behavior normal.       Assessment & Plan     Problem List Items Addressed This Visit          Psychiatric    Anxiety    Current Assessment & Plan     Feels that prozac is working too much. 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 Decreasing to 10 mg.          Relevant Medications    FLUoxetine 10 MG capsule       Cardiac/Vascular    Essential hypertension    Current Assessment & Plan     Blood pressure well controlled continue current medications.             Orthopedic    Closed fracture of right wrist - Primary    Relevant Orders    Ambulatory referral/consult to Orthopedics       Follow up if symptoms worsen or fail to improve.          Portions of this note may have been created with voice recognition software. Occasional "wrong-word" or "sound-a-like" substitutions may have occurred due to the inherent limitations of voice recognition software. Please, read the note carefully and recognize, using context, where substitutions have occurred.   "

## 2022-10-26 NOTE — ASSESSMENT & PLAN NOTE
Feels that prozac is working too much. 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 Decreasing to 10 mg.

## 2022-10-28 DIAGNOSIS — M79.641 RIGHT HAND PAIN: ICD-10-CM

## 2022-10-28 DIAGNOSIS — M25.331 SCAPHOLUNATE DISSOCIATION OF RIGHT WRIST: Primary | ICD-10-CM

## 2022-11-09 ENCOUNTER — HOSPITAL ENCOUNTER (OUTPATIENT)
Dept: RADIOLOGY | Facility: HOSPITAL | Age: 62
Discharge: HOME | End: 2022-11-09
Attending: ORTHOPAEDIC SURGERY
Payer: MEDICAID

## 2022-11-09 ENCOUNTER — OFFICE VISIT (OUTPATIENT)
Dept: ORTHOPEDICS | Facility: CLINIC | Age: 62
End: 2022-11-09
Payer: MEDICAID

## 2022-11-09 ENCOUNTER — HOSPITAL ENCOUNTER (OUTPATIENT)
Dept: CARDIOLOGY | Facility: HOSPITAL | Age: 62
Discharge: HOME | End: 2022-11-09
Attending: ORTHOPAEDIC SURGERY
Payer: MEDICAID

## 2022-11-09 VITALS — BODY MASS INDEX: 25.84 KG/M2 | WEIGHT: 195 LBS | HEIGHT: 73 IN

## 2022-11-09 DIAGNOSIS — M25.331 SCAPHOLUNATE DISSOCIATION OF RIGHT WRIST: ICD-10-CM

## 2022-11-09 DIAGNOSIS — Z01.818 PREOP TESTING: Primary | ICD-10-CM

## 2022-11-09 DIAGNOSIS — M25.331 SCAPHOLUNATE DISSOCIATION OF RIGHT WRIST: Primary | ICD-10-CM

## 2022-11-09 DIAGNOSIS — S62.101A CLOSED FRACTURE OF RIGHT WRIST, INITIAL ENCOUNTER: ICD-10-CM

## 2022-11-09 DIAGNOSIS — Z01.818 PREOP TESTING: ICD-10-CM

## 2022-11-09 DIAGNOSIS — M79.641 RIGHT HAND PAIN: ICD-10-CM

## 2022-11-09 PROCEDURE — 1160F PR REVIEW ALL MEDS BY PRESCRIBER/CLIN PHARMACIST DOCUMENTED: ICD-10-PCS | Mod: CPTII,,, | Performed by: ORTHOPAEDIC SURGERY

## 2022-11-09 PROCEDURE — 73110 X-RAY EXAM OF WRIST: CPT | Mod: TC,RT

## 2022-11-09 PROCEDURE — 99999 PR PBB SHADOW E&M-EST. PATIENT-LVL III: ICD-10-PCS | Mod: PBBFAC,,, | Performed by: ORTHOPAEDIC SURGERY

## 2022-11-09 PROCEDURE — 99214 OFFICE O/P EST MOD 30 MIN: CPT | Mod: S$PBB,,, | Performed by: ORTHOPAEDIC SURGERY

## 2022-11-09 PROCEDURE — 99213 OFFICE O/P EST LOW 20 MIN: CPT | Mod: PBBFAC | Performed by: ORTHOPAEDIC SURGERY

## 2022-11-09 PROCEDURE — 3008F PR BODY MASS INDEX (BMI) DOCUMENTED: ICD-10-PCS | Mod: CPTII,,, | Performed by: ORTHOPAEDIC SURGERY

## 2022-11-09 PROCEDURE — 1159F PR MEDICATION LIST DOCUMENTED IN MEDICAL RECORD: ICD-10-PCS | Mod: CPTII,,, | Performed by: ORTHOPAEDIC SURGERY

## 2022-11-09 PROCEDURE — 1159F MED LIST DOCD IN RCRD: CPT | Mod: CPTII,,, | Performed by: ORTHOPAEDIC SURGERY

## 2022-11-09 PROCEDURE — 3008F BODY MASS INDEX DOCD: CPT | Mod: CPTII,,, | Performed by: ORTHOPAEDIC SURGERY

## 2022-11-09 PROCEDURE — 99214 PR OFFICE/OUTPT VISIT, EST, LEVL IV, 30-39 MIN: ICD-10-PCS | Mod: S$PBB,,, | Performed by: ORTHOPAEDIC SURGERY

## 2022-11-09 PROCEDURE — 93005 ELECTROCARDIOGRAM TRACING: CPT

## 2022-11-09 PROCEDURE — 4010F ACE/ARB THERAPY RXD/TAKEN: CPT | Mod: CPTII,,, | Performed by: ORTHOPAEDIC SURGERY

## 2022-11-09 PROCEDURE — 99999 PR PBB SHADOW E&M-EST. PATIENT-LVL III: CPT | Mod: PBBFAC,,, | Performed by: ORTHOPAEDIC SURGERY

## 2022-11-09 PROCEDURE — 73110 XR WRIST COMPLETE 3 VIEWS RIGHT: ICD-10-PCS | Mod: 26,RT,, | Performed by: RADIOLOGY

## 2022-11-09 PROCEDURE — 1160F RVW MEDS BY RX/DR IN RCRD: CPT | Mod: CPTII,,, | Performed by: ORTHOPAEDIC SURGERY

## 2022-11-09 PROCEDURE — 93010 ELECTROCARDIOGRAM REPORT: CPT | Mod: ,,, | Performed by: INTERNAL MEDICINE

## 2022-11-09 PROCEDURE — 4010F PR ACE/ARB THEARPY RXD/TAKEN: ICD-10-PCS | Mod: CPTII,,, | Performed by: ORTHOPAEDIC SURGERY

## 2022-11-09 PROCEDURE — 93010 EKG 12-LEAD: ICD-10-PCS | Mod: ,,, | Performed by: INTERNAL MEDICINE

## 2022-11-09 PROCEDURE — 73110 X-RAY EXAM OF WRIST: CPT | Mod: 26,RT,, | Performed by: RADIOLOGY

## 2022-11-09 NOTE — H&P (VIEW-ONLY)
Subjective:     Patient ID: Aguilar Rowe is a 62 y.o. male.    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  Radiographs show a scaphoid waist fracture with some displacement..  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    Past Medical History:   Diagnosis Date    Hypertension      Past Surgical History:   Procedure Laterality Date    APPENDECTOMY      CARPAL TUNNEL RELEASE Right 9/9/2021    Procedure: RELEASE, CARPAL TUNNEL;  Surgeon: Nilton Dubose MD;  Location: [facility] OR;  Service: Orthopedics;  Laterality: Right;  right carpal tunnel release and right long and ring trigger finger release    FINGER SURGERY Left     SHOULDER SURGERY Right     TRIGGER FINGER RELEASE Right 9/9/2021    Procedure: RELEASE, TRIGGER FINGER;  Surgeon: Nilton Dubose MD;  Location: [facility] OR;  Service: Orthopedics;  Laterality: Right;  right carpal tunnel release and right long and ring trigger finger release     Family History   Problem Relation Age of Onset    No Known Problems Mother     No Known Problems Father      Social History     Socioeconomic History    Marital status: Single   Tobacco Use    Smoking status: Never    Smokeless tobacco: Never   Substance and Sexual Activity    Alcohol use: Yes     Alcohol/week: 4.0 standard drinks     Types: 4 Cans of beer per week     Comment: beer daily    Drug use: Never    Sexual activity: Yes     Medication List with Changes/Refills   Current Medications    ACETAMINOPHEN (TYLENOL 8 HOUR ORAL)    Take by mouth every 8 (eight) hours as needed.    ASPIRIN (ECOTRIN) 81 MG EC TABLET    Take 81 mg by mouth once daily.    ATORVASTATIN (LIPITOR) 20 MG TABLET    Take 20 mg by mouth once daily.    FLUOXETINE 10 MG CAPSULE    Take 1 capsule (10 mg total)  by mouth once daily.    HYDROXYZINE PAMOATE (VISTARIL) 25 MG CAP    Take 1 capsule (25 mg total) by mouth every 6 (six) hours as needed (anxiety).    IBUPROFEN ORAL    Take by mouth every 8 (eight) hours as needed.    LISINOPRIL 10 MG TABLET    TAKE 1 TABLET(10 MG) BY MOUTH EVERY DAY     Review of patient's allergies indicates:   Allergen Reactions    Morphine Other (See Comments)     Visual hallucinations     Review of Systems   Constitutional: Negative for malaise/fatigue.   HENT:  Negative for hearing loss.    Eyes:  Negative for double vision and visual disturbance.   Cardiovascular:  Positive for chest pain and syncope.   Respiratory:  Positive for shortness of breath.    Endocrine: Negative for cold intolerance.   Hematologic/Lymphatic: Does not bruise/bleed easily.   Skin:  Negative for poor wound healing and suspicious lesions.   Musculoskeletal:  Negative for gout, joint pain and joint swelling.   Gastrointestinal:  Negative for nausea and vomiting.   Genitourinary:  Negative for dysuria.   Neurological:  Positive for numbness, paresthesias and sensory change.   Psychiatric/Behavioral:  Positive for substance abuse. Negative for depression and memory loss. The patient is nervous/anxious.    Allergic/Immunologic: Negative for persistent infections.     Objective:   Body mass index is 25.73 kg/m².  There were no vitals filed for this visit.             General    Constitutional: He is oriented to person, place, and time. He appears well-developed and well-nourished. No distress.   HENT:   Head: Normocephalic.   Eyes: EOM are normal.   Pulmonary/Chest: Effort normal.   Neurological: He is oriented to person, place, and time.   Psychiatric: He has a normal mood and affect.             Right Hand/Wrist Exam     Inspection   Scars: Wrist - absent Hand -  absent  Effusion: Wrist - absent Hand -  absent    Tenderness   The patient is tender to palpation of the snuff box.    Other     Neuorologic Exam    Median  Distribution: normal  Ulnar Distribution: normal  Radial Distribution: normal    Comments:  The patient has snuffbox tenderness right wrist.  There is no deformity.  There are no motor or sensory deficits.          Vascular Exam       Capillary Refill  Right Hand: normal capillary refill        Relevant imaging results reviewed and interpreted by me, discussed with the patient and / or family today radiographs right wrist showed a scaphoid waist fracture with some displacement as well as pre-existing scapholunate dissociation and degenerative change distal radioulnar joint  Assessment:     Encounter Diagnosis   Name Primary?    Closed fracture of right wrist, initial encounter         Plan:     The patient wishes to have an open reduction internal fixation right scaphoid fracture.  Risk complications and alternatives were discussed including the risk of infection, anesthetic risk, injury to nerves and vessels, loss of motion, and possible need for additional surgeries were discussed.  He was told there was a 15% rate of nonunion.  An exogen bone stimulator was ordered today.  He is in a thumb spica splint.  All questions were answered.  He seems to understand and agree that surgery.                Disclaimer: This note was prepared using a voice recognition system and is likely to have sound alike errors within the text.

## 2022-11-09 NOTE — PROGRESS NOTES
Subjective:     Patient ID: Aguilar Rowe is a 62 y.o. male.    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  Radiographs show a scaphoid waist fracture with some displacement..  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    Past Medical History:   Diagnosis Date    Hypertension      Past Surgical History:   Procedure Laterality Date    APPENDECTOMY      CARPAL TUNNEL RELEASE Right 9/9/2021    Procedure: RELEASE, CARPAL TUNNEL;  Surgeon: Nilton Dbuose MD;  Location: [facility] OR;  Service: Orthopedics;  Laterality: Right;  right carpal tunnel release and right long and ring trigger finger release    FINGER SURGERY Left     SHOULDER SURGERY Right     TRIGGER FINGER RELEASE Right 9/9/2021    Procedure: RELEASE, TRIGGER FINGER;  Surgeon: Nilton Dubose MD;  Location: [facility] OR;  Service: Orthopedics;  Laterality: Right;  right carpal tunnel release and right long and ring trigger finger release     Family History   Problem Relation Age of Onset    No Known Problems Mother     No Known Problems Father      Social History     Socioeconomic History    Marital status: Single   Tobacco Use    Smoking status: Never    Smokeless tobacco: Never   Substance and Sexual Activity    Alcohol use: Yes     Alcohol/week: 4.0 standard drinks     Types: 4 Cans of beer per week     Comment: beer daily    Drug use: Never    Sexual activity: Yes     Medication List with Changes/Refills   Current Medications    ACETAMINOPHEN (TYLENOL 8 HOUR ORAL)    Take by mouth every 8 (eight) hours as needed.    ASPIRIN (ECOTRIN) 81 MG EC TABLET    Take 81 mg by mouth once daily.    ATORVASTATIN (LIPITOR) 20 MG TABLET    Take 20 mg by mouth once daily.    FLUOXETINE 10 MG CAPSULE    Take 1 capsule (10 mg total)  by mouth once daily.    HYDROXYZINE PAMOATE (VISTARIL) 25 MG CAP    Take 1 capsule (25 mg total) by mouth every 6 (six) hours as needed (anxiety).    IBUPROFEN ORAL    Take by mouth every 8 (eight) hours as needed.    LISINOPRIL 10 MG TABLET    TAKE 1 TABLET(10 MG) BY MOUTH EVERY DAY     Review of patient's allergies indicates:   Allergen Reactions    Morphine Other (See Comments)     Visual hallucinations     Review of Systems   Constitutional: Negative for malaise/fatigue.   HENT:  Negative for hearing loss.    Eyes:  Negative for double vision and visual disturbance.   Cardiovascular:  Positive for chest pain and syncope.   Respiratory:  Positive for shortness of breath.    Endocrine: Negative for cold intolerance.   Hematologic/Lymphatic: Does not bruise/bleed easily.   Skin:  Negative for poor wound healing and suspicious lesions.   Musculoskeletal:  Negative for gout, joint pain and joint swelling.   Gastrointestinal:  Negative for nausea and vomiting.   Genitourinary:  Negative for dysuria.   Neurological:  Positive for numbness, paresthesias and sensory change.   Psychiatric/Behavioral:  Positive for substance abuse. Negative for depression and memory loss. The patient is nervous/anxious.    Allergic/Immunologic: Negative for persistent infections.     Objective:   Body mass index is 25.73 kg/m².  There were no vitals filed for this visit.             General    Constitutional: He is oriented to person, place, and time. He appears well-developed and well-nourished. No distress.   HENT:   Head: Normocephalic.   Eyes: EOM are normal.   Pulmonary/Chest: Effort normal.   Neurological: He is oriented to person, place, and time.   Psychiatric: He has a normal mood and affect.             Right Hand/Wrist Exam     Inspection   Scars: Wrist - absent Hand -  absent  Effusion: Wrist - absent Hand -  absent    Tenderness   The patient is tender to palpation of the snuff box.    Other     Neuorologic Exam    Median  Distribution: normal  Ulnar Distribution: normal  Radial Distribution: normal    Comments:  The patient has snuffbox tenderness right wrist.  There is no deformity.  There are no motor or sensory deficits.          Vascular Exam       Capillary Refill  Right Hand: normal capillary refill        Relevant imaging results reviewed and interpreted by me, discussed with the patient and / or family today radiographs right wrist showed a scaphoid waist fracture with some displacement as well as pre-existing scapholunate dissociation and degenerative change distal radioulnar joint  Assessment:     Encounter Diagnosis   Name Primary?    Closed fracture of right wrist, initial encounter         Plan:     The patient wishes to have an open reduction internal fixation right scaphoid fracture.  Risk complications and alternatives were discussed including the risk of infection, anesthetic risk, injury to nerves and vessels, loss of motion, and possible need for additional surgeries were discussed.  He was told there was a 15% rate of nonunion.  An exogen bone stimulator was ordered today.  He is in a thumb spica splint.  All questions were answered.  He seems to understand and agree that surgery.                Disclaimer: This note was prepared using a voice recognition system and is likely to have sound alike errors within the text.

## 2022-11-10 ENCOUNTER — DOCUMENTATION ONLY (OUTPATIENT)
Dept: PREADMISSION TESTING | Facility: HOSPITAL | Age: 62
End: 2022-11-10
Payer: MEDICAID

## 2022-11-10 ENCOUNTER — PATIENT MESSAGE (OUTPATIENT)
Dept: PREADMISSION TESTING | Facility: HOSPITAL | Age: 62
End: 2022-11-10
Payer: MEDICAID

## 2022-11-10 NOTE — PROGRESS NOTES
Spoke with patient to review pre-op instructions for upcoming surgery on Monday.  Patient is unable to speak at present time and asks to be called back this afternoon.

## 2022-11-10 NOTE — TELEPHONE ENCOUNTER
Pre op instructions reviewed with patient per phone.      To confirm, your doctor has instructed you: Surgery is scheduled for 11/14/2022 at [facility] [place].     Pre admit office will call the afternoon prior to surgery between 1PM and 3PM with arrival time.    Surgery will be at [facility],  The address is [address]. [place]  [address].        [facility] Visitor/Ride Policy:   Only 1 adult allowed (over the age of 18) to accompany you into Pre-op/Recovery Surgery Dept and must stay through the entire length of admission.     Must have a ride home from a responsible adult that you know and trust.      Pediatric Patients are allowed 2 adult visitors.     Medical Transport, Uber or Lyft can only be used if patient has a responsible adult to accompany them during ride home.       IMPORTANT INSTRUCTIONS!    Do not eat or drink after 12 midnight, including water. Do not smoke or use chewing tobacco after 12 midnight  OK to brush teeth, but no gum, candy, or mints!      Take only these medicines with a small swallow of water-morning of surgery.       Prozac.        __ No makeup, mascara, nail polish or artificial nails      __ Please remove all piercings and leave all jewelry at home.    **SURGERY WILL BE CANCELLED IF PIERCINGS ARE PRESENT!!!**       __Dentures, Hearing Aids and Contact Lens need to be removed prior to the start of surgery.       __ Wear clean, loose-fitting clothing. Allow for dressings/bandages/surgical equipment      __ You must have transportation, and they MUST stay the entire time.        ____ Stop Aspirin, Ibuprofen, Motrin and Aleve at least 5-7 days before surgery, unless otherwise instructed by your doctor, or the nurse.   You MAY use Tylenol/acetaminophen until day of surgery.      ____  If you take diabetic medication, do NOT take morning of surgery unless instructed by Doctor. Metformin must be stopped 24 hrs prior to surgery time.       ____ Stop taking any Fish Oil  supplements or Vitamins at least 5 days prior to surgery, unless instructed otherwise by your Doctor.       Bathing Instructions: The night before surgery and the morning prior to coming to the hospital:    - Shower & rinse your body as usual with anti-bacterial Soap (Dial or Fidel 2000)   -Hibiclens (if indicated) use AFTER anti-bacterial soap; 1 packet PM/1 packet in AM on surgical site only   -Do not use hibiclens on your head, face, or genitals.    -Do not wash with anti-bacterial soap after you use the hibiclens.    -Do not shave surgical site 5-7 days prior to surgery.    -Pubic hair 7 days prior to surgery (gyn pt's).      Pediatric patients do not need to use anti-bacterial soap or Hibiclens.             After Bathing:   __ No powder, lotions, creams. perfume to skin     __No deodorant for any breast procedure, PORT, or upper arm surgery            Post-Op Instructions: You will receive surgery post-op instructions by your Discharge Nurse prior to going home.     Surgical Site Infection:   Prevention of surgical site infections:   -Keep incisions clean and dry.   -Do not soak/submerge incisions in water until completely healed.   -Do not apply lotions, powders, creams, or deodorants to site.   -Always make sure hands are cleaned with antibacterial soap/ alcohol-based   prior to touching the surgical site.       Signs and symptoms:               -Redness and pain around the area where you had surgery               -Drainage of cloudy fluid from your surgical wound               -Fever over 100.4 or chills     >>>Call Surgeon office/on-call Surgeon if you experience any of these signs & symptoms post-surgery.        *Please Call [facility] Pre-Admissions Department with surgery instruction questions at [phone].     *Insurance Questions, please call [phone] or [phone]    Spoke about pre op process and surgery instructions, verbalized understanding.

## 2022-11-10 NOTE — PROGRESS NOTES
Pre op instructions reviewed with patient per phone.      To confirm, your doctor has instructed you: Surgery is scheduled for 11/14/2022 at [facility] [place].     Pre admit office will call the afternoon prior to surgery between 1PM and 3PM with arrival time.    Surgery will be at [facility],  The address is [address]. [place]  [address].        [facility] Visitor/Ride Policy:   Only 1 adult allowed (over the age of 18) to accompany you into Pre-op/Recovery Surgery Dept and must stay through the entire length of admission.     Must have a ride home from a responsible adult that you know and trust.      Pediatric Patients are allowed 2 adult visitors.     Medical Transport, Uber or Lyft can only be used if patient has a responsible adult to accompany them during ride home.       IMPORTANT INSTRUCTIONS!    Do not eat or drink after 12 midnight, including water. Do not smoke or use chewing tobacco after 12 midnight  OK to brush teeth, but no gum, candy, or mints!      Take only these medicines with a small swallow of water-morning of surgery.       Prozac.        __ No makeup, mascara, nail polish or artificial nails      __ Please remove all piercings and leave all jewelry at home.    **SURGERY WILL BE CANCELLED IF PIERCINGS ARE PRESENT!!!**       __Dentures, Hearing Aids and Contact Lens need to be removed prior to the start of surgery.       __ Wear clean, loose-fitting clothing. Allow for dressings/bandages/surgical equipment      __ You must have transportation, and they MUST stay the entire time.        ____ Stop Aspirin, Ibuprofen, Motrin and Aleve at least 5-7 days before surgery, unless otherwise instructed by your doctor, or the nurse.   You MAY use Tylenol/acetaminophen until day of surgery.      ____  If you take diabetic medication, do NOT take morning of surgery unless instructed by Doctor. Metformin must be stopped 24 hrs prior to surgery time.       ____ Stop taking any Fish Oil  supplements or Vitamins at least 5 days prior to surgery, unless instructed otherwise by your Doctor.       Bathing Instructions: The night before surgery and the morning prior to coming to the hospital:    - Shower & rinse your body as usual with anti-bacterial Soap (Dial or Fidel 2000)   -Hibiclens (if indicated) use AFTER anti-bacterial soap; 1 packet PM/1 packet in AM on surgical site only   -Do not use hibiclens on your head, face, or genitals.    -Do not wash with anti-bacterial soap after you use the hibiclens.    -Do not shave surgical site 5-7 days prior to surgery.    -Pubic hair 7 days prior to surgery (gyn pt's).      Pediatric patients do not need to use anti-bacterial soap or Hibiclens.             After Bathing:   __ No powder, lotions, creams. perfume to skin     __No deodorant for any breast procedure, PORT, or upper arm surgery            Post-Op Instructions: You will receive surgery post-op instructions by your Discharge Nurse prior to going home.     Surgical Site Infection:   Prevention of surgical site infections:   -Keep incisions clean and dry.   -Do not soak/submerge incisions in water until completely healed.   -Do not apply lotions, powders, creams, or deodorants to site.   -Always make sure hands are cleaned with antibacterial soap/ alcohol-based   prior to touching the surgical site.       Signs and symptoms:               -Redness and pain around the area where you had surgery               -Drainage of cloudy fluid from your surgical wound               -Fever over 100.4 or chills     >>>Call Surgeon office/on-call Surgeon if you experience any of these signs & symptoms post-surgery.        *Please Call [facility] Pre-Admissions Department with surgery instruction questions at [phone].     *Insurance Questions, please call [phone] or [phone]    Spoke about pre op process and surgery instructions, verbalized understanding.

## 2022-11-12 ENCOUNTER — ANESTHESIA EVENT (OUTPATIENT)
Dept: SURGERY | Facility: HOSPITAL | Age: 62
End: 2022-11-12
Payer: MEDICAID

## 2022-11-12 NOTE — ANESTHESIA PREPROCEDURE EVALUATION
11/12/2022  Aguilar Rowe is a 62 y.o., male.    Pre-op Assessment    I have reviewed the Patient Summary Reports.    I have reviewed the Nursing Notes. I have reviewed the NPO Status.   I have reviewed the Medications.     Review of Systems  Anesthesia Hx:  No problems with previous Anesthesia  History of prior surgery of interest to airway management or planning: Previous anesthesia: MAC Denies Family Hx of Anesthesia complications.   Denies Personal Hx of Anesthesia complications.   Social:  Social Alcohol Use    Hematology/Oncology:  Hematology Normal        Cardiovascular:   Hypertension ECG has been reviewed.    Pulmonary:  Pulmonary Normal    Renal/:  Renal/ Normal     Hepatic/GI:   Hiatal Hernia,    Neurological:  Neurology Normal    Psych:   anxiety          Physical Exam  General:  Well nourished      Airway/Jaw/Neck:  Airway Findings: Mouth Opening: Normal   Tongue: Normal   General Airway Assessment: Adult Mallampati: II  TM Distance: Normal, at least 6 cm   Jaw/Neck Findings:  Neck ROM: Normal ROM   Neck Findings:     Eyes/Ears/Nose:  Eyes/Ears/Nose Findings:    Dental:  Dental Findings: In tact      Chest/Lungs:  Chest/Lungs Findings: Clear to auscultation, Normal Respiratory Rate      Heart/Vascular:  Heart Findings: Rate: Normal  Rhythm: Regular Rhythm  Sounds: Normal  Heart murmur: negative Vascular Findings: Normal    Abdomen:  Abdomen Findings: Normal    Musculoskeletal:  Musculoskeletal Findings: Normal   Skin:  Skin Findings: Normal    Mental Status:  Mental Status Findings:  Alert and Oriented         Anesthesia Plan  Type of Anesthesia, risks & benefits discussed:  Anesthesia Type:  MAC, Gen Supraglottic Airway, Gen Natural Airway    Patient's Preference:   Plan Factors:          Intra-op Monitoring Plan: standard ASA monitors  Intra-op Monitoring Plan Comments:   Post Op Pain  Control Plan: per primary service following discharge from PACU, multimodal analgesia and peripheral nerve block  Post Op Pain Control Plan Comments:     Induction:   IV  Beta Blocker:  Patient is not currently on a Beta-Blocker (No further documentation required).       Informed Consent: Informed consent signed with the Patient and all parties understand the risks and agree with anesthesia plan.  All questions answered.  Anesthesia consent signed with patient.  ASA Score: 2     Day of Surgery Review of History & Physical:    H&P Update referred to the surgeon/provider.          Ready For Surgery From Anesthesia Perspective.           Physical Exam  General: Well nourished    Airway:  Mallampati: II   Mouth Opening: Normal  TM Distance: Normal, at least 6 cm  Tongue: Normal  Neck ROM: Normal ROM    Dental:  In tact    Chest/Lungs:  Clear to auscultation, Normal Respiratory Rate    Heart:  Rate: Normal  Rhythm: Regular Rhythm  Sounds: Normal          Anesthesia Plan  Type of Anesthesia, risks & benefits discussed:    Anesthesia Type: MAC, Gen Supraglottic Airway, Gen Natural Airway  Intra-op Monitoring Plan: standard ASA monitors  Post Op Pain Control Plan: per primary service following discharge from PACU, multimodal analgesia and peripheral nerve block  Induction:  IV  Informed Consent: Informed consent signed with the Patient and all parties understand the risks and agree with anesthesia plan.  All questions answered.   ASA Score: 2  Day of Surgery Review of History & Physical: H&P Update referred to the surgeon/provider.    Ready For Surgery From Anesthesia Perspective.       .

## 2022-11-14 ENCOUNTER — HOSPITAL ENCOUNTER (OUTPATIENT)
Dept: RADIOLOGY | Facility: HOSPITAL | Age: 62
Discharge: HOME | End: 2022-11-14
Attending: ORTHOPAEDIC SURGERY | Admitting: ORTHOPAEDIC SURGERY
Payer: MEDICAID

## 2022-11-14 ENCOUNTER — HOSPITAL ENCOUNTER (OUTPATIENT)
Facility: HOSPITAL | Age: 62
Discharge: HOME | End: 2022-11-14
Attending: ORTHOPAEDIC SURGERY | Admitting: ORTHOPAEDIC SURGERY
Payer: MEDICAID

## 2022-11-14 ENCOUNTER — ANESTHESIA (OUTPATIENT)
Dept: SURGERY | Facility: HOSPITAL | Age: 62
End: 2022-11-14
Payer: MEDICAID

## 2022-11-14 VITALS
HEART RATE: 67 BPM | HEIGHT: 73 IN | RESPIRATION RATE: 18 BRPM | TEMPERATURE: 98 F | OXYGEN SATURATION: 96 % | WEIGHT: 194.25 LBS | BODY MASS INDEX: 25.74 KG/M2 | DIASTOLIC BLOOD PRESSURE: 77 MMHG | SYSTOLIC BLOOD PRESSURE: 117 MMHG

## 2022-11-14 DIAGNOSIS — M25.331 SCAPHOLUNATE DISSOCIATION OF RIGHT WRIST: ICD-10-CM

## 2022-11-14 DIAGNOSIS — S62.101A CLOSED FRACTURE OF RIGHT WRIST, INITIAL ENCOUNTER: Primary | ICD-10-CM

## 2022-11-14 PROCEDURE — 01830 ANES ARTHR/NDSC WRST/HND NOS: CPT | Performed by: ORTHOPAEDIC SURGERY

## 2022-11-14 PROCEDURE — 88305 TISSUE EXAM BY PATHOLOGIST: ICD-10-PCS | Mod: 26,,, | Performed by: STUDENT IN AN ORGANIZED HEALTH CARE EDUCATION/TRAINING PROGRAM

## 2022-11-14 PROCEDURE — 63600175 PHARM REV CODE 636 W HCPCS

## 2022-11-14 PROCEDURE — 76942 ECHO GUIDE FOR BIOPSY: CPT | Mod: 26,,, | Performed by: ANESTHESIOLOGY

## 2022-11-14 PROCEDURE — 25000003 PHARM REV CODE 250: Performed by: ORTHOPAEDIC SURGERY

## 2022-11-14 PROCEDURE — 73120 X-RAY EXAM OF HAND: CPT | Mod: TC,RT

## 2022-11-14 PROCEDURE — D9220A PRA ANESTHESIA: ICD-10-PCS | Mod: ANES,,, | Performed by: ANESTHESIOLOGY

## 2022-11-14 PROCEDURE — 36000709 HC OR TIME LEV III EA ADD 15 MIN: Performed by: ORTHOPAEDIC SURGERY

## 2022-11-14 PROCEDURE — D9220A PRA ANESTHESIA: ICD-10-PCS | Mod: CRNA,,, | Performed by: NURSE ANESTHETIST, CERTIFIED REGISTERED

## 2022-11-14 PROCEDURE — 76942 PR U/S GUIDANCE FOR NEEDLE GUIDANCE: ICD-10-PCS | Mod: 26,,, | Performed by: ANESTHESIOLOGY

## 2022-11-14 PROCEDURE — 64450 NJX AA&/STRD OTHER PN/BRANCH: CPT | Performed by: ORTHOPAEDIC SURGERY

## 2022-11-14 PROCEDURE — 88305 TISSUE EXAM BY PATHOLOGIST: CPT | Performed by: STUDENT IN AN ORGANIZED HEALTH CARE EDUCATION/TRAINING PROGRAM

## 2022-11-14 PROCEDURE — 88311 DECALCIFY TISSUE: CPT | Performed by: STUDENT IN AN ORGANIZED HEALTH CARE EDUCATION/TRAINING PROGRAM

## 2022-11-14 PROCEDURE — 63600175 PHARM REV CODE 636 W HCPCS: Performed by: ANESTHESIOLOGY

## 2022-11-14 PROCEDURE — 76942 ECHO GUIDE FOR BIOPSY: CPT | Performed by: ANESTHESIOLOGY

## 2022-11-14 PROCEDURE — D9220A PRA ANESTHESIA: Mod: CRNA,,, | Performed by: NURSE ANESTHETIST, CERTIFIED REGISTERED

## 2022-11-14 PROCEDURE — 71000033 HC RECOVERY, INTIAL HOUR: Performed by: ORTHOPAEDIC SURGERY

## 2022-11-14 PROCEDURE — 27200651 HC AIRWAY, LMA: Performed by: ANESTHESIOLOGY

## 2022-11-14 PROCEDURE — 37000009 HC ANESTHESIA EA ADD 15 MINS: Performed by: ORTHOPAEDIC SURGERY

## 2022-11-14 PROCEDURE — 25215 PR REMOVAL OF PROX ROW CARPAL BONES: ICD-10-PCS | Mod: RT,,, | Performed by: ORTHOPAEDIC SURGERY

## 2022-11-14 PROCEDURE — 25215 REMOVAL OF WRIST BONES: CPT | Mod: RT,,, | Performed by: ORTHOPAEDIC SURGERY

## 2022-11-14 PROCEDURE — 63600175 PHARM REV CODE 636 W HCPCS: Performed by: NURSE ANESTHETIST, CERTIFIED REGISTERED

## 2022-11-14 PROCEDURE — 88311 DECALCIFY TISSUE: CPT | Mod: 26,,, | Performed by: STUDENT IN AN ORGANIZED HEALTH CARE EDUCATION/TRAINING PROGRAM

## 2022-11-14 PROCEDURE — 25000003 PHARM REV CODE 250: Performed by: NURSE ANESTHETIST, CERTIFIED REGISTERED

## 2022-11-14 PROCEDURE — 37000008 HC ANESTHESIA 1ST 15 MINUTES: Performed by: ORTHOPAEDIC SURGERY

## 2022-11-14 PROCEDURE — 64415 PR NERVE BLOCK INJ, ANES/STEROID, BRACHIAL PLEXUS, INCL IMAG GUIDANCE: ICD-10-PCS | Mod: 59,RT,, | Performed by: ANESTHESIOLOGY

## 2022-11-14 PROCEDURE — 88305 TISSUE EXAM BY PATHOLOGIST: CPT | Mod: 26,,, | Performed by: STUDENT IN AN ORGANIZED HEALTH CARE EDUCATION/TRAINING PROGRAM

## 2022-11-14 PROCEDURE — 36000708 HC OR TIME LEV III 1ST 15 MIN: Performed by: ORTHOPAEDIC SURGERY

## 2022-11-14 PROCEDURE — D9220A PRA ANESTHESIA: Mod: ANES,,, | Performed by: ANESTHESIOLOGY

## 2022-11-14 PROCEDURE — 64415 NJX AA&/STRD BRCH PLXS IMG: CPT | Mod: 59,RT,, | Performed by: ANESTHESIOLOGY

## 2022-11-14 PROCEDURE — 88311 PR  DECALCIFY TISSUE: ICD-10-PCS | Mod: 26,,, | Performed by: STUDENT IN AN ORGANIZED HEALTH CARE EDUCATION/TRAINING PROGRAM

## 2022-11-14 PROCEDURE — 71000015 HC POSTOP RECOV 1ST HR: Performed by: ORTHOPAEDIC SURGERY

## 2022-11-14 RX ORDER — PHENYLEPHRINE HYDROCHLORIDE 10 MG/ML
INJECTION INTRAVENOUS
Status: DISCONTINUED | OUTPATIENT
Start: 2022-11-14 | End: 2022-11-14

## 2022-11-14 RX ORDER — CHLORHEXIDINE GLUCONATE ORAL RINSE 1.2 MG/ML
10 SOLUTION DENTAL 2 TIMES DAILY
Status: DISCONTINUED | OUTPATIENT
Start: 2022-11-14 | End: 2022-11-14 | Stop reason: HOSPADM

## 2022-11-14 RX ORDER — HYDROCODONE BITARTRATE AND ACETAMINOPHEN 5; 325 MG/1; MG/1
1 TABLET ORAL EVERY 4 HOURS PRN
Status: DISCONTINUED | OUTPATIENT
Start: 2022-11-14 | End: 2022-11-14 | Stop reason: SDUPTHER

## 2022-11-14 RX ORDER — ONDANSETRON 2 MG/ML
4 INJECTION INTRAMUSCULAR; INTRAVENOUS ONCE AS NEEDED
Status: DISCONTINUED | OUTPATIENT
Start: 2022-11-14 | End: 2022-11-14 | Stop reason: HOSPADM

## 2022-11-14 RX ORDER — MEPERIDINE HYDROCHLORIDE 25 MG/ML
12.5 INJECTION INTRAMUSCULAR; INTRAVENOUS; SUBCUTANEOUS ONCE
Status: DISCONTINUED | OUTPATIENT
Start: 2022-11-14 | End: 2022-11-14 | Stop reason: HOSPADM

## 2022-11-14 RX ORDER — DEXAMETHASONE SODIUM PHOSPHATE 4 MG/ML
INJECTION, SOLUTION INTRA-ARTICULAR; INTRALESIONAL; INTRAMUSCULAR; INTRAVENOUS; SOFT TISSUE
Status: DISCONTINUED | OUTPATIENT
Start: 2022-11-14 | End: 2022-11-14

## 2022-11-14 RX ORDER — ALBUTEROL SULFATE 0.83 MG/ML
2.5 SOLUTION RESPIRATORY (INHALATION) EVERY 4 HOURS PRN
Status: DISCONTINUED | OUTPATIENT
Start: 2022-11-14 | End: 2022-11-14 | Stop reason: HOSPADM

## 2022-11-14 RX ORDER — HYDROCODONE BITARTRATE AND ACETAMINOPHEN 5; 325 MG/1; MG/1
1 TABLET ORAL EVERY 4 HOURS PRN
Status: DISCONTINUED | OUTPATIENT
Start: 2022-11-14 | End: 2022-11-14 | Stop reason: HOSPADM

## 2022-11-14 RX ORDER — MIDAZOLAM HYDROCHLORIDE 1 MG/ML
INJECTION, SOLUTION INTRAMUSCULAR; INTRAVENOUS
Status: DISCONTINUED | OUTPATIENT
Start: 2022-11-14 | End: 2022-11-14

## 2022-11-14 RX ORDER — ONDANSETRON 2 MG/ML
INJECTION INTRAMUSCULAR; INTRAVENOUS
Status: DISCONTINUED | OUTPATIENT
Start: 2022-11-14 | End: 2022-11-14

## 2022-11-14 RX ORDER — OXYCODONE AND ACETAMINOPHEN 10; 325 MG/1; MG/1
1 TABLET ORAL EVERY 6 HOURS PRN
Qty: 28 TABLET | Refills: 0 | Status: SHIPPED | OUTPATIENT
Start: 2022-11-14 | End: 2022-11-29

## 2022-11-14 RX ORDER — SODIUM CHLORIDE 0.9 % (FLUSH) 0.9 %
10 SYRINGE (ML) INJECTION
Status: DISCONTINUED | OUTPATIENT
Start: 2022-11-14 | End: 2022-11-14 | Stop reason: HOSPADM

## 2022-11-14 RX ORDER — BUPIVACAINE HYDROCHLORIDE 2.5 MG/ML
INJECTION, SOLUTION EPIDURAL; INFILTRATION; INTRACAUDAL
Status: DISCONTINUED
Start: 2022-11-14 | End: 2022-11-14 | Stop reason: HOSPADM

## 2022-11-14 RX ORDER — DIPHENHYDRAMINE HYDROCHLORIDE 50 MG/ML
25 INJECTION INTRAMUSCULAR; INTRAVENOUS EVERY 6 HOURS PRN
Status: DISCONTINUED | OUTPATIENT
Start: 2022-11-14 | End: 2022-11-14 | Stop reason: HOSPADM

## 2022-11-14 RX ORDER — FENTANYL CITRATE 50 UG/ML
25 INJECTION, SOLUTION INTRAMUSCULAR; INTRAVENOUS EVERY 5 MIN PRN
Status: COMPLETED | OUTPATIENT
Start: 2022-11-14 | End: 2022-11-14

## 2022-11-14 RX ORDER — CHLORHEXIDINE GLUCONATE ORAL RINSE 1.2 MG/ML
10 SOLUTION DENTAL
Status: DISCONTINUED | OUTPATIENT
Start: 2022-11-14 | End: 2022-11-14 | Stop reason: SDUPTHER

## 2022-11-14 RX ORDER — FENTANYL CITRATE 50 UG/ML
INJECTION, SOLUTION INTRAMUSCULAR; INTRAVENOUS
Status: DISCONTINUED | OUTPATIENT
Start: 2022-11-14 | End: 2022-11-14

## 2022-11-14 RX ORDER — BUPIVACAINE HYDROCHLORIDE 2.5 MG/ML
INJECTION, SOLUTION EPIDURAL; INFILTRATION; INTRACAUDAL
Status: DISCONTINUED | OUTPATIENT
Start: 2022-11-14 | End: 2022-11-14 | Stop reason: HOSPADM

## 2022-11-14 RX ORDER — ROPIVACAINE HYDROCHLORIDE 5 MG/ML
INJECTION, SOLUTION EPIDURAL; INFILTRATION; PERINEURAL
Status: COMPLETED | OUTPATIENT
Start: 2022-11-14 | End: 2022-11-14

## 2022-11-14 RX ORDER — CEFAZOLIN SODIUM 2 G/50ML
2 SOLUTION INTRAVENOUS
Status: COMPLETED | OUTPATIENT
Start: 2022-11-14 | End: 2022-11-14

## 2022-11-14 RX ORDER — LIDOCAINE HYDROCHLORIDE 20 MG/ML
INJECTION, SOLUTION EPIDURAL; INFILTRATION; INTRACAUDAL; PERINEURAL
Status: DISCONTINUED | OUTPATIENT
Start: 2022-11-14 | End: 2022-11-14

## 2022-11-14 RX ORDER — PROPOFOL 10 MG/ML
VIAL (ML) INTRAVENOUS
Status: DISCONTINUED | OUTPATIENT
Start: 2022-11-14 | End: 2022-11-14

## 2022-11-14 RX ORDER — SODIUM CHLORIDE, SODIUM LACTATE, POTASSIUM CHLORIDE, CALCIUM CHLORIDE 600; 310; 30; 20 MG/100ML; MG/100ML; MG/100ML; MG/100ML
INJECTION, SOLUTION INTRAVENOUS CONTINUOUS
Status: DISCONTINUED | OUTPATIENT
Start: 2022-11-14 | End: 2022-11-14 | Stop reason: HOSPADM

## 2022-11-14 RX ORDER — CHLORHEXIDINE GLUCONATE ORAL RINSE 1.2 MG/ML
10 SOLUTION DENTAL 2 TIMES DAILY
Status: DISCONTINUED | OUTPATIENT
Start: 2022-11-14 | End: 2022-11-14 | Stop reason: SDUPTHER

## 2022-11-14 RX ADMIN — PHENYLEPHRINE HYDROCHLORIDE 100 MCG: 10 INJECTION INTRAVENOUS at 12:11

## 2022-11-14 RX ADMIN — SODIUM CHLORIDE, SODIUM LACTATE, POTASSIUM CHLORIDE, AND CALCIUM CHLORIDE: 600; 310; 30; 20 INJECTION, SOLUTION INTRAVENOUS at 12:11

## 2022-11-14 RX ADMIN — FENTANYL CITRATE 50 MCG: 50 INJECTION, SOLUTION INTRAMUSCULAR; INTRAVENOUS at 11:11

## 2022-11-14 RX ADMIN — FENTANYL CITRATE 25 MCG: 50 INJECTION, SOLUTION INTRAMUSCULAR; INTRAVENOUS at 01:11

## 2022-11-14 RX ADMIN — MIDAZOLAM 2 MG: 1 INJECTION INTRAMUSCULAR; INTRAVENOUS at 11:11

## 2022-11-14 RX ADMIN — CEFAZOLIN SODIUM 2 G: 2 SOLUTION INTRAVENOUS at 11:11

## 2022-11-14 RX ADMIN — PROPOFOL 160 MG: 10 INJECTION, EMULSION INTRAVENOUS at 11:11

## 2022-11-14 RX ADMIN — PHENYLEPHRINE HYDROCHLORIDE 200 MCG: 10 INJECTION INTRAVENOUS at 12:11

## 2022-11-14 RX ADMIN — ROPIVACAINE HYDROCHLORIDE 15 ML: 5 INJECTION, SOLUTION EPIDURAL; INFILTRATION; PERINEURAL at 11:11

## 2022-11-14 RX ADMIN — ONDANSETRON 4 MG: 2 INJECTION, SOLUTION INTRAMUSCULAR; INTRAVENOUS at 12:11

## 2022-11-14 RX ADMIN — DEXAMETHASONE SODIUM PHOSPHATE 8 MG: 4 INJECTION, SOLUTION INTRA-ARTICULAR; INTRALESIONAL; INTRAMUSCULAR; INTRAVENOUS; SOFT TISSUE at 11:11

## 2022-11-14 RX ADMIN — PHENYLEPHRINE HYDROCHLORIDE 100 MCG: 10 INJECTION INTRAVENOUS at 11:11

## 2022-11-14 RX ADMIN — LIDOCAINE HYDROCHLORIDE 80 MG: 20 INJECTION, SOLUTION EPIDURAL; INFILTRATION; INTRACAUDAL; PERINEURAL at 11:11

## 2022-11-14 RX ADMIN — GLYCOPYRROLATE 0.2 MG: 0.2 INJECTION, SOLUTION INTRAMUSCULAR; INTRAVITREAL at 11:11

## 2022-11-14 RX ADMIN — PHENYLEPHRINE HYDROCHLORIDE 200 MCG: 10 INJECTION INTRAVENOUS at 11:11

## 2022-11-14 RX ADMIN — SODIUM CHLORIDE, SODIUM LACTATE, POTASSIUM CHLORIDE, AND CALCIUM CHLORIDE: 600; 310; 30; 20 INJECTION, SOLUTION INTRAVENOUS at 11:11

## 2022-11-14 NOTE — DISCHARGE INSTRUCTIONS
Nozin Instructions  Goal: the goal of Nozin is to reduce the risk of post-procedural infections by bacteria in the nasal cavity. Think of it as hand  for your nose.    How to use:    1. Shake Nozin bottle well    2. Take a cotton swab and apply 4 drops to one tip    3. Insert cotton tip into one nostril, being sure not to go deeper into nose than tip of the swab.    4. Swab nostril 6 times counterclockwise and 6 times clockwise. Make sure to swab the inside front pocket of the nostril.    5. Take swab out and apply 2 drops to the same cotton tip. Repeat steps 3 and 4 in the other nostril.        Do steps 1-5 twice a day for 7 days.        After Hand Surgery  After surgery, the better you take care of yourself--especially your hand--the sooner it will heal. Follow your surgeons instructions. Try not to bump your hand, and dont move or lift anything while youre still wearing bandages, a splint, or a cast.  Care for your hand    Keep your hand elevated above heart level as much as possible for the first several days after surgery. This helps reduce swelling and pain.  To help prevent infection and speed healing, take care not to get your cast or bandages wet.  Keep dressing clean, dry, & intact until your follow up appointment.   Relieve pain as directed  Your surgeon may prescribe pain medicine or suggest you take an anti-inflammatory medicine. You might also be instructed to apply ice (or another cold source) to your hand. If you use ice cubes, put them in a plastic bag and rest it on top of your bandages. Leave the cold source on your hand for as long as its comfortable. Do this several times a day for the first few days after surgery. It may take several minutes before you can feel the cold through the cast or bandages.  Follow up with your surgeon  During a follow-up visit after surgery, your surgeon will check your progress. The stitches, bandages, splint, or cast may be removed. A new cast or splint  may be placed. If your hand has healed enough, your surgeon may prescribe exercises.  Do prescribed hand exercises  Your surgeon may recommend that you do exercises. These may be done under the guidance of a physical or occupational therapist. The exercises strengthen your hand, help you regain flexibility, and restore proper function. Do the exercises as advised.  Call your surgeon if you have...  A fever higher than 100.4°F (38.0°C) taken by mouth  Side effects from your medicine, such as prolonged nausea  A wet or loose dressing, or a dressing that is too tight  Excessive bleeding  Increased, ongoing pain or numbness  Signs of infection (such as drainage, warmth, or redness) at the incision site   Date Last Reviewed: 11/11/2015  © 1878-3696 [employer]. [address] [place] [address], [place] [address]. All rights reserved. This information is not intended as a substitute for professional medical care. Always follow your healthcare professional's instructions.

## 2022-11-14 NOTE — DISCHARGE SUMMARY
[facility]  Discharge Note  Short Stay    Procedure(s) (LRB):  Proximal row CARPECTOMY (Right) wrist      OUTCOME: Patient tolerated treatment/procedure well without complication and is now ready for discharge.    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

## 2022-11-14 NOTE — OP NOTE
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  Regional anesthesia had been administered by the anesthesia service in the holding area prior to surgery.  The patient was taken to the operating room.  The right arm was prepped and draped in the usual sterile fashion.  After exsanguination of the extremity a proximal tourniquet was inflated to 250 mmHg after patient received 2 g of Ancef intravenously preoperatively.  At this time a dorsal longitudinal incision was made centered over Srikanth's tubercle.  The incision extended using blunt and sharp dissection using 3.5 loupe magnification.  The extensor retinaculum was opened.  The 2nd through the 5th compartments were decompressed from the retinaculum and retracted medial and laterally.  The scapholunate interval and radioscaphoid radiolunate joints were identified with 18 gauge needle under image intensification.  A T-shaped incision was made in the capsule.  The fracture of the scaphoid was identified.  The scaphoid and lunate were removed without difficulty.  The distal pole the scaphoid was identified and removed with McGlamry elevators.  The trapezium was identified and removed with McGlamry elevators and a towel clip.  Intraoperative images showed satisfactory resection of the scaphoid the lunate and the triquetrum.  Good articular surface was identified on the lunate and the distal  radius.  The volar ligaments appeared to be intact.  The capsule was closed using 3-0 Vicryl suture.  Subcutaneous tissue was closed using 3-0 Vicryl suture.  Skin was closed using horizontal mattress 4-0 nylon suture.  Xeroform gauze 4x4s and a well-padded volar splint was applied.  The patient tolerated the procedure well was transferred recovery room in satisfactory condition.  A sling was applied.    Significant Surgical Tasks Conducted by the Assistant(s), if Applicable:  No assistant    Complications:  None     Estimated Blood Loss (EBL):  5 mL           Implants: None    Specimens:  Right wrist scaphoid lunate and triquetrum bones           Condition: Good    Disposition: PACU - hemodynamically stable.    Attestation: I was present and scrubbed for the entire procedure.

## 2022-11-14 NOTE — ANESTHESIA POSTPROCEDURE EVALUATION
Anesthesia Post Evaluation    Patient: Aguilar Rowe    Procedure(s) Performed: Procedure(s) (LRB):  CARPECTOMY (Right)    Final Anesthesia Type: general      Patient location during evaluation: PACU  Patient participation: Yes- Able to Participate  Level of consciousness: awake and alert and oriented  Post-procedure vital signs: reviewed and stable  Pain management: adequate  Airway patency: patent    PONV status at discharge: No PONV  Anesthetic complications: no      Cardiovascular status: blood pressure returned to baseline, stable and hemodynamically stable  Respiratory status: unassisted  Hydration status: euvolemic  Follow-up not needed.          Vitals Value Taken Time   /77 11/14/22 1340   Temp 36.5 °C (97.7 °F) 11/14/22 1255   Pulse 69 11/14/22 1341   Resp 22 11/14/22 1341   SpO2 95 % 11/14/22 1341   Vitals shown include unvalidated device data.      Event Time   Out of Recovery 13:30:00         Pain/Elieser Score: Pain Rating Prior to Med Admin: 8 (11/14/2022  1:25 PM)  Elieser Score: 10 (11/14/2022  1:45 PM)

## 2022-11-14 NOTE — ANESTHESIA PROCEDURE NOTES
Intubation    Date/Time: 11/14/2022 11:32 AM  Performed by: Marina Muro CRNA  Authorized by: Mindi Zamora MD     Intubation:     Induction:  Intravenous    Intubated:  Postinduction    Mask Ventilation:  Not attempted    Attempts:  1    Attempted By:  CRNA    Difficult Airway Encountered?: No      Complications:  None    Airway Device:  Supraglottic airway/LMA    Airway Device Size:  4.0    Style/Cuff Inflation:  Cuffed    Secured at:  The lips    Placement Verified By:  Capnometry    Findings Post-Intubation:  Atraumatic/condition of teeth unchanged

## 2022-11-14 NOTE — ANESTHESIA PROCEDURE NOTES
Peripheral Block    Patient location during procedure: pre-op   Block not for primary anesthetic.  Reason for block: at surgeon's request and post-op pain management   Post-op Pain Location: Right arm   Start time: 11/14/2022 11:15 AM  Timeout: 11/14/2022 11:15 AM   End time: 11/14/2022 11:24 AM    Staffing  Authorizing Provider: Mindi Zamora MD  Performing Provider: Mindi Zamora MD    Staffing  Other anesthesia staff: Marina Muro CRNA  Preanesthetic Checklist  Completed: patient identified, IV checked, site marked, risks and benefits discussed, surgical consent, monitors and equipment checked, pre-op evaluation and timeout performed  Peripheral Block  Patient position: supine  Prep: ChloraPrep  Patient monitoring: heart rate, cardiac monitor, continuous pulse ox, continuous capnometry and frequent blood pressure checks  Block type: axillary  Laterality: right  Injection technique: single shot  Needle  Needle type: Stimuplex   Needle gauge: 21 G  Needle length: 4 in  Needle localization: anatomical landmarks, ultrasound guidance and nerve stimulator   -ultrasound image captured on disc.  Assessment  Injection assessment: negative aspiration and negative parasthesia  Paresthesia pain: none  Heart rate change: no  Slow fractionated injection: yes  Pain Tolerance: comfortable throughout block and no complaints  Medications:    Medications: ropivacaine (NAROPIN) injection 0.5% - Perineural   15 mL - 11/14/2022 11:24:00 AM    Additional Notes  VSS.  [facility] RN monitoring vitals throughout procedure.  Patient tolerated procedure well.

## 2022-11-14 NOTE — PLAN OF CARE
Right axillary nerve block performed by Dr. Zamora. Patient tolerated well. VSS. Cardiac monitor in place..

## 2022-11-14 NOTE — H&P
Subjective:     Patient ID: Aguilar Rowe is a 62 y.o. male.    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  Radiographs show a scaphoid waist fracture with some displacement..  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    Past Medical History:   Diagnosis Date    Hypertension      Past Surgical History:   Procedure Laterality Date    APPENDECTOMY      CARPAL TUNNEL RELEASE Right 9/9/2021    Procedure: RELEASE, CARPAL TUNNEL;  Surgeon: Nilton Dubose MD;  Location: [facility] OR;  Service: Orthopedics;  Laterality: Right;  right carpal tunnel release and right long and ring trigger finger release    FINGER SURGERY Left     SHOULDER SURGERY Right     TRIGGER FINGER RELEASE Right 9/9/2021    Procedure: RELEASE, TRIGGER FINGER;  Surgeon: Nilton Dubose MD;  Location: [facility] OR;  Service: Orthopedics;  Laterality: Right;  right carpal tunnel release and right long and ring trigger finger release     Family History   Problem Relation Age of Onset    No Known Problems Mother     No Known Problems Father      Social History     Socioeconomic History    Marital status: Single   Tobacco Use    Smoking status: Never    Smokeless tobacco: Never   Substance and Sexual Activity    Alcohol use: Yes     Alcohol/week: 4.0 standard drinks     Types: 4 Cans of beer per week     Comment: beer daily    Drug use: Never    Sexual activity: Yes     Medication List with Changes/Refills   Current Medications    ACETAMINOPHEN (TYLENOL 8 HOUR ORAL)    Take by mouth every 8 (eight) hours as needed.    ASPIRIN (ECOTRIN) 81 MG EC TABLET    Take 81 mg by mouth once daily.    ATORVASTATIN (LIPITOR) 20 MG TABLET    Take 20 mg by mouth once daily.    FLUOXETINE 10 MG CAPSULE    Take 1 capsule (10 mg total)  by mouth once daily.    HYDROXYZINE PAMOATE (VISTARIL) 25 MG CAP    Take 1 capsule (25 mg total) by mouth every 6 (six) hours as needed (anxiety).    IBUPROFEN ORAL    Take by mouth every 8 (eight) hours as needed.    LISINOPRIL 10 MG TABLET    TAKE 1 TABLET(10 MG) BY MOUTH EVERY DAY     Review of patient's allergies indicates:   Allergen Reactions    Morphine Other (See Comments)     Visual hallucinations     Review of Systems   Constitutional: Negative for malaise/fatigue.   HENT:  Negative for hearing loss.    Eyes:  Negative for double vision and visual disturbance.   Cardiovascular:  Positive for chest pain and syncope.   Respiratory:  Positive for shortness of breath.    Endocrine: Negative for cold intolerance.   Hematologic/Lymphatic: Does not bruise/bleed easily.   Skin:  Negative for poor wound healing and suspicious lesions.   Musculoskeletal:  Negative for gout, joint pain and joint swelling.   Gastrointestinal:  Negative for nausea and vomiting.   Genitourinary:  Negative for dysuria.   Neurological:  Positive for numbness, paresthesias and sensory change.   Psychiatric/Behavioral:  Positive for substance abuse. Negative for depression and memory loss. The patient is nervous/anxious.    Allergic/Immunologic: Negative for persistent infections.     Objective:   Body mass index is 25.73 kg/m².  There were no vitals filed for this visit.             General    Constitutional: He is oriented to person, place, and time. He appears well-developed and well-nourished. No distress.   HENT:   Head: Normocephalic.   Eyes: EOM are normal.   Pulmonary/Chest: Effort normal.   Neurological: He is oriented to person, place, and time.   Psychiatric: He has a normal mood and affect.             Right Hand/Wrist Exam     Inspection   Scars: Wrist - absent Hand -  absent  Effusion: Wrist - absent Hand -  absent    Tenderness   The patient is tender to palpation of the snuff box.    Other     Neuorologic Exam    Median  Distribution: normal  Ulnar Distribution: normal  Radial Distribution: normal    Comments:  The patient has snuffbox tenderness right wrist.  There is no deformity.  There are no motor or sensory deficits.          Vascular Exam       Capillary Refill  Right Hand: normal capillary refill        Relevant imaging results reviewed and interpreted by me, discussed with the patient and / or family today radiographs right wrist showed a scaphoid waist fracture with some displacement as well as pre-existing scapholunate dissociation and degenerative change distal radioulnar joint  Assessment:     Encounter Diagnosis   Name Primary?    Closed fracture of right wrist, initial encounter         Plan:     The patient wishes to have a proximal row carpectomy I think this will solve his problem of his scaphoid fracture and scapholunate dissociation with degenerative joint disease..  Risk complications and alternatives were discussed including the risk of infection, anesthetic risk, injury to nerves and vessels, loss of motion, and possible need for additional surgeries were discussed.  He was told that with an attempted open reduction internal fixation of the scaphoid fracture there was a 15% rate of nonunion.  Additionally this would not solve his scapholunate dissociation or degenerative joint disease.  I recommended a proximal row carpectomy which I think would address all relevant issues.  All questions were answered.  He seems to understand and agree that surgery.               Disclaimer: This note was prepared using a voice recognition system and is likely to have sound alike errors within the text.

## 2022-11-15 ENCOUNTER — NURSE TRIAGE (OUTPATIENT)
Dept: ADMINISTRATIVE | Facility: CLINIC | Age: 62
End: 2022-11-15
Payer: MEDICAID

## 2022-11-15 DIAGNOSIS — M25.331 SCAPHOLUNATE DISSOCIATION OF RIGHT WRIST: Primary | ICD-10-CM

## 2022-11-15 RX ORDER — IBUPROFEN 800 MG/1
800 TABLET ORAL EVERY 8 HOURS PRN
Qty: 30 TABLET | Refills: 0 | Status: SHIPPED | OUTPATIENT
Start: 2022-11-15 | End: 2022-11-16 | Stop reason: SDUPTHER

## 2022-11-15 RX ORDER — IBUPROFEN 800 MG/1
800 TABLET ORAL 3 TIMES DAILY
OUTPATIENT
Start: 2022-11-15

## 2022-11-15 NOTE — TELEPHONE ENCOUNTER
Spoke to the triage nurse, patients nerve block has worn off and he is in extreme pain. Patient wanted to know if we can add another medication or we can do another nerve block. I spoke with Jayshree Mendez PA-C she agreed to add ibuprofen 800 mgs for the patient. Will call patient to let them know.

## 2022-11-15 NOTE — TELEPHONE ENCOUNTER
Pt c/o post op pain to right wrist. States the nerve block has worn off and the PRN meds are not helping every 6 hours. Pt asking to have another nerve block, states severe pain. Denies numbness tingling or swelling to right arm at this time. Per care advice, advised to discuss with PCP and callback from nurse within 1 hour. Pt asking if he can take another Percocet now. Advised pt to follow rx as prescribed per MD at this time. Spoke to YAHIR Zavaleta with Dr. Dubose's office, stats will call pt back once she speaks to MD. Pt verbalizes understanding.   Reason for Disposition   Caller has URGENT medicine question about med that PCP or specialist prescribed and triager unable to answer question    Protocols used: Medication Question Call-A-[place]

## 2022-11-16 DIAGNOSIS — M25.331 SCAPHOLUNATE DISSOCIATION OF RIGHT WRIST: Primary | ICD-10-CM

## 2022-11-16 RX ORDER — IBUPROFEN 800 MG/1
800 TABLET ORAL 3 TIMES DAILY
Qty: 30 TABLET | Refills: 0 | Status: SHIPPED | OUTPATIENT
Start: 2022-11-16 | End: 2022-11-29

## 2022-11-18 ENCOUNTER — OFFICE VISIT (OUTPATIENT)
Dept: ORTHOPEDICS | Facility: CLINIC | Age: 62
End: 2022-11-18
Payer: MEDICAID

## 2022-11-18 VITALS — BODY MASS INDEX: 25.71 KG/M2 | WEIGHT: 194 LBS | HEIGHT: 73 IN

## 2022-11-18 DIAGNOSIS — Z98.890 STATUS POST PROXIMAL ROW CARPECTOMY OF WRIST: Primary | ICD-10-CM

## 2022-11-18 PROCEDURE — 3008F BODY MASS INDEX DOCD: CPT | Mod: CPTII,,,

## 2022-11-18 PROCEDURE — 99024 PR POST-OP FOLLOW-UP VISIT: ICD-10-PCS | Mod: ,,,

## 2022-11-18 PROCEDURE — 99213 OFFICE O/P EST LOW 20 MIN: CPT | Mod: PBBFAC

## 2022-11-18 PROCEDURE — 99999 PR PBB SHADOW E&M-EST. PATIENT-LVL III: CPT | Mod: PBBFAC,,,

## 2022-11-18 PROCEDURE — 99024 POSTOP FOLLOW-UP VISIT: CPT | Mod: ,,,

## 2022-11-18 PROCEDURE — 1159F PR MEDICATION LIST DOCUMENTED IN MEDICAL RECORD: ICD-10-PCS | Mod: CPTII,,,

## 2022-11-18 PROCEDURE — 4010F PR ACE/ARB THEARPY RXD/TAKEN: ICD-10-PCS | Mod: CPTII,,,

## 2022-11-18 PROCEDURE — 99999 PR PBB SHADOW E&M-EST. PATIENT-LVL III: ICD-10-PCS | Mod: PBBFAC,,,

## 2022-11-18 PROCEDURE — 3008F PR BODY MASS INDEX (BMI) DOCUMENTED: ICD-10-PCS | Mod: CPTII,,,

## 2022-11-18 PROCEDURE — 1159F MED LIST DOCD IN RCRD: CPT | Mod: CPTII,,,

## 2022-11-18 PROCEDURE — 4010F ACE/ARB THERAPY RXD/TAKEN: CPT | Mod: CPTII,,,

## 2022-11-18 NOTE — PROGRESS NOTES
"SUBJECTIVE:      Patient ID: Aguilar Rowe is a 62 y.o. male.    HPI: Mr. Rowe is here today for post-operative visit #1.  He is 4 days status post right proximal row carpectomy by Dr. Dubose on 11/14/22. Pain is 4/10 today.  Reports that the pain was excruciating after the nerve block wore off.  He states that he had taken the Percocet every 2 hours instead of every 6 hours. He is now taking ibuprofen 800 mg.  He has been compliant with postop instructions and keeping the extremity dry. He denies fever, chills, and sweats since the time of the surgery.     Past Medical History:   Diagnosis Date    Hypertension      Past Surgical History:   Procedure Laterality Date    APPENDECTOMY      CARPAL TUNNEL RELEASE Right 9/9/2021    Procedure: RELEASE, CARPAL TUNNEL;  Surgeon: Nilton Dubose MD;  Location: [facility];  Service: Orthopedics;  Laterality: Right;  right carpal tunnel release and right long and ring trigger finger release    CARPECTOMY Right 11/14/2022    Procedure: CARPECTOMY;  Surgeon: Nilton Dubose MD;  Location: [facility] OR;  Service: Orthopedics;  Laterality: Right;  proximal row carpectomy    FINGER SURGERY Left     SHOULDER SURGERY Right     TRIGGER FINGER RELEASE Right 9/9/2021    Procedure: RELEASE, TRIGGER FINGER;  Surgeon: Nilton Dubose MD;  Location: [facility];  Service: Orthopedics;  Laterality: Right;  right carpal tunnel release and right long and ring trigger finger release     Family History   Problem Relation Age of Onset    No Known Problems Mother     Lung cancer Father     No Known Problems Sister      Social History     Socioeconomic History    Marital status: Single   Tobacco Use    Smoking status: Never    Smokeless tobacco: Never   Substance and Sexual Activity    Alcohol use: Yes     Alcohol/week: 4.0 standard drinks     Types: 4 Cans of beer per week     Comment: Drinks "a few beers in the evening".    Drug use: Never    Sexual activity: Yes     Medication List " "with Changes/Refills   Current Medications    ACETAMINOPHEN (TYLENOL 8 HOUR ORAL)    Take by mouth every 8 (eight) hours as needed.    ASPIRIN (ECOTRIN) 81 MG EC TABLET    Take 81 mg by mouth once daily.    ATORVASTATIN (LIPITOR) 20 MG TABLET    Take 20 mg by mouth once daily.    FLUOXETINE 10 MG CAPSULE    Take 1 capsule (10 mg total) by mouth once daily.    HYDROXYZINE PAMOATE (VISTARIL) 25 MG CAP    TAKE 1 CAPSULE(25 MG) BY MOUTH EVERY 6 HOURS AS NEEDED FOR ANXIETY    IBUPROFEN (ADVIL,MOTRIN) 800 MG TABLET    Take 1 tablet (800 mg total) by mouth 3 (three) times daily.    LISINOPRIL 10 MG TABLET    TAKE 1 TABLET(10 MG) BY MOUTH EVERY DAY    OXYCODONE-ACETAMINOPHEN (PERCOCET)  MG PER TABLET    Take 1 tablet by mouth every 6 (six) hours as needed for Pain.     Review of patient's allergies indicates:   Allergen Reactions    Morphine Other (See Comments)     Visual hallucinations       OBJECTIVE:     Physical exam:    Vitals:    11/18/22 0753   Weight: 88 kg (194 lb)   Height: 6' 1" (1.854 m)   PainSc:   4   PainLoc: Wrist     Vital signs are stable, patient is afebrile.  Patient is well dressed and well groomed, no acute distress.  Alert and oriented to person, place, and time.    Right UE:  Post op dressing taken down.   Incision is clean, dry, and intact.   There is no erythema or exudate. There is no sign of any infection.   Moderate edema to hand and wrist  He is NVI.   Sutures in place.   2+ pulses noted.  Cap refill <2 seconds  Motor intact to hand    ASSESSMENT         Encounter Diagnosis   Name Primary?    Status post proximal row carpectomy of wrist Yes            4 days status post right proximal row carpectomy    PLAN:           Aguilar was seen today for pain and post-op evaluation.    Diagnoses and all orders for this visit:    Status post proximal row carpectomy of wrist      - PO instruction reviewed and provided to patient  - The incision was cleaned with hydrogen peroxide and normal saline. A " sterile Band-Aid was applied.   - Patient may clean the incision daily as above.   - wrist brace provided.  Instructed to wear full-time, may remove to shower.  -discussed techniques to reduce swelling: Ice, elevation, gentle hand pumps  - Xray right wrist at post op #2  - Patient should notify the office of any signs or symptoms of infection including fevers, erythema, purulent drainage, increasing pain.    - Follow up for suture removal     POST OPERATIVE INSTRUCTIONS - Visit #1    BANDAGES/DRESSING/BATHING:  We have removed the dressing, cleaned your incision, and put on a band-aid at your first post op visit today. You may clean the incision daily as we did today. Keep the incision clean and dry. When showering, you may cover the incision with a plastic bag/umbrella bag.   Do not use Neosporin or other topical ointments or creams on the incision. If you are concerned about any redness or drainage of the incisions, please call the office.    SWELLING  Some swelling of your arm, hand and fingers is normal. The swelling can be decreased by  elevating your arm on a few pillows when lying down. Swelling can be further controlled by cold therapy over the surgical site. Flexion/extension of the fingers (opening and closing your hands) will also help to relieve swelling and prevent stiffness.    ACTIVITY  You may use the hand and wrist as tolerated for light activity. You are encouraged to bend the wrist, elbow and fingers as soon as the initial surgical dressing is removed. Avoid lifting, pushing, or pulling any object greater than 5-10 pounds for the first 10-14 days.     BRACE  Wear your wrist brace full-time.    MEDICATIONS  Take pain medicines exactly as directed.  If the doctor gave you a prescription medicine for pain, take it as prescribed.  If you are not taking a prescription pain medicine, take an over-the-counter medicine such as acetaminophen (Tylenol), ibuprofen (Advil, Motrin), or naproxen (Aleve) as long  as you do not have an allergy or medical condition that prevents you from taking them.  Do not take two or more pain medicines at the same time unless the doctor told you to. Many pain medicines have acetaminophen, which is Tylenol. Too much acetaminophen (Tylenol) can be harmful.    FOLLOW -UP  You should be scheduled for a post-op appointment within the 10-14 days following surgery, at which time we will review your surgery, remove sutures and evaluate incisions, review your post-operative program and answer any of your questions.        Jayshree Hong PA-C   [facility]

## 2022-11-22 LAB
FINAL PATHOLOGIC DIAGNOSIS: NORMAL
GROSS: NORMAL
Lab: NORMAL

## 2022-11-28 NOTE — PROGRESS NOTES
SUBJECTIVE:      Patient ID: Aguilar Rowe is a 62 y.o. male.    HPI: Mr. Rowe is here today for post-operative visit #2.  He is 15 days status post right proximal row carpectomy by Dr. Dubose on 11/14/22. He reports that he is doing well.  Pain is 4/10.  He is not taking any pain medicine.  He states that he has been gently working the hand.  He has not been wearing his brace.  He has been compliant with keeping the extremity clean and dry. He denies fever, chills, and sweats since the time of the surgery.     Interval hx 11/18/22: Mr. Rowe is here today for post-operative visit #1.  He is 4 days status post right proximal row carpectomy by Dr. Dubose on 11/14/22. Pain is 4/10 today.  Reports that the pain was excruciating after the nerve block wore off.  He states that he had taken the Percocet every 2 hours instead of every 6 hours. He is now taking ibuprofen 800 mg.  He has been compliant with postop instructions and keeping the extremity dry. He denies fever, chills, and sweats since the time of the surgery.     Past Medical History:   Diagnosis Date    Hypertension      Past Surgical History:   Procedure Laterality Date    APPENDECTOMY      CARPAL TUNNEL RELEASE Right 9/9/2021    Procedure: RELEASE, CARPAL TUNNEL;  Surgeon: Nilton Dubose MD;  Location: [facility] OR;  Service: Orthopedics;  Laterality: Right;  right carpal tunnel release and right long and ring trigger finger release    CARPECTOMY Right 11/14/2022    Procedure: CARPECTOMY;  Surgeon: iNlton Dubose MD;  Location: [facility] OR;  Service: Orthopedics;  Laterality: Right;  proximal row carpectomy    FINGER SURGERY Left     SHOULDER SURGERY Right     TRIGGER FINGER RELEASE Right 9/9/2021    Procedure: RELEASE, TRIGGER FINGER;  Surgeon: Nilton Dubose MD;  Location: [facility] OR;  Service: Orthopedics;  Laterality: Right;  right carpal tunnel release and right long and ring trigger finger release     Family History   Problem Relation  "Age of Onset    No Known Problems Mother     Lung cancer Father     No Known Problems Sister      Social History     Socioeconomic History    Marital status: Single   Tobacco Use    Smoking status: Never    Smokeless tobacco: Never   Substance and Sexual Activity    Alcohol use: Yes     Alcohol/week: 4.0 standard drinks     Types: 4 Cans of beer per week     Comment: Drinks "a few beers in the evening".    Drug use: Never    Sexual activity: Yes     Medication List with Changes/Refills   Current Medications    ACETAMINOPHEN (TYLENOL 8 HOUR ORAL)    Take by mouth every 8 (eight) hours as needed.    ASPIRIN (ECOTRIN) 81 MG EC TABLET    Take 81 mg by mouth once daily.    ATORVASTATIN (LIPITOR) 20 MG TABLET    Take 20 mg by mouth once daily.    FLUOXETINE 10 MG CAPSULE    Take 1 capsule (10 mg total) by mouth once daily.    HYDROXYZINE PAMOATE (VISTARIL) 25 MG CAP    TAKE 1 CAPSULE(25 MG) BY MOUTH EVERY 6 HOURS AS NEEDED FOR ANXIETY    IBUPROFEN (ADVIL,MOTRIN) 800 MG TABLET    Take 1 tablet (800 mg total) by mouth 3 (three) times daily.    LISINOPRIL 10 MG TABLET    TAKE 1 TABLET(10 MG) BY MOUTH EVERY DAY    OXYCODONE-ACETAMINOPHEN (PERCOCET)  MG PER TABLET    Take 1 tablet by mouth every 6 (six) hours as needed for Pain.     Review of patient's allergies indicates:   Allergen Reactions    Morphine Other (See Comments)     Visual hallucinations       OBJECTIVE:     Physical exam:    Vitals:    11/29/22 0724   Weight: 88 kg (194 lb)   Height: 6' 1" (1.854 m)   PainSc:   4   PainLoc: Wrist     Vital signs are stable, patient is afebrile.  Patient is well dressed and well groomed, no acute distress.  Alert and oriented to person, place, and time.    Right UE:  Incision is clean, dry, and intact.   There is no erythema or exudate. There is no sign of any infection.   Moderate swelling to wrist and hand, improving since last visit  He is NVI.   Sutures in place.   2+ pulses noted.  Cap refill <2 seconds  Motor intact " to hand    EXAMINATION:  XR WRIST COMPLETE 3 VIEWS RIGHT     CLINICAL HISTORY:  Other specified postprocedural states     TECHNIQUE:  PA, lateral, and oblique views of the right wrist were performed.     COMPARISON:  11/09/2022     FINDINGS:  There has been interval resection of the proximal row of the carpal bones.  The piece of form was not resected.  Soft tissue swelling seen surrounding the wrist.  Small calcification seen adjacent to the distal ulna and in the expected location of the proximal carpal row likely representing a combination of loose bodies and dystrophic calcifications.     Impression:     As above        Electronically signed by: Dima Lane DO  Date:                                            11/29/2022  Time:                                           08:21    ASSESSMENT         Encounter Diagnosis   Name Primary?    Status post proximal row carpectomy of wrist Yes            15 days status post right proximal row carpectomy    PLAN:           Aguilar was seen today for pain and post-op evaluation.    Diagnoses and all orders for this visit:    Status post proximal row carpectomy of wrist      - PO instruction reviewed and provided to patient  - The incision was cleaned with hydrogen peroxide. Sutures removed with no difficulty. Steri-Strips applied.   - discussed importance of wearing wrist brace. Wear full time for 2 more weeks, then wean  - xrays reviewed, stable  - discussed techniques to help with swelling:  Gentle hand pumps, ice, elevation    - Patient should notify the office of any signs or symptoms of infection including fevers, erythema, purulent drainage, increasing pain.    - Follow up PRN     POST OPERATIVE VISIT INSTRUCTIONS - Visit #2    1. No soaking the incision for at least 7-10 days. You may get it wet in the shower and use regular soap.    2. To avoid a hard, painful scar, we recommend you use Mederma and/or Silicone Scar patches. You may also use Cocoa Butter, Vitamin E  oil, Coconut oil, etc.    You may start this 5-7 days after stitches are removed and when wound is completely closed.    - Mederma scar cream: scar massage over incision 1-2 times per day. You may use topical lotion or Vitamin E oil. Massage an additional few times a day to help the scar become soft and less sensitive.    - Silicone Scar Patches: cut and place over the incision, then massage over the patch to help with scarring and sensitivity. Can be reused up to 10 days if you rinse it clean, let it dry out, then reapply.    Brands: Mepiform Silicone Scar Sheets (recommended), Scar Away, [employer] brand or generic pharmacy brand ("[employer], [employer])    3. Continue range of motion exercises as instructed at todays visit.    4. You may take Tylenol 500mg and/or Ibuprofen 400mg every 4-6 hours with food for pain as tolerated as long as you do not have an allergy or medical condition that prevents you from taking them.        JOSELUIS DuncanD[facility]

## 2022-11-29 ENCOUNTER — HOSPITAL ENCOUNTER (OUTPATIENT)
Dept: RADIOLOGY | Facility: HOSPITAL | Age: 62
Discharge: HOME | End: 2022-11-29
Payer: MEDICAID

## 2022-11-29 ENCOUNTER — OFFICE VISIT (OUTPATIENT)
Dept: ORTHOPEDICS | Facility: CLINIC | Age: 62
End: 2022-11-29
Payer: MEDICAID

## 2022-11-29 VITALS — HEIGHT: 73 IN | WEIGHT: 194 LBS | BODY MASS INDEX: 25.71 KG/M2

## 2022-11-29 DIAGNOSIS — Z98.890 STATUS POST PROXIMAL ROW CARPECTOMY OF WRIST: Primary | ICD-10-CM

## 2022-11-29 DIAGNOSIS — Z98.890 STATUS POST PROXIMAL ROW CARPECTOMY OF WRIST: ICD-10-CM

## 2022-11-29 PROCEDURE — 99999 PR PBB SHADOW E&M-EST. PATIENT-LVL II: CPT | Mod: PBBFAC,,,

## 2022-11-29 PROCEDURE — 3008F BODY MASS INDEX DOCD: CPT | Mod: CPTII,,,

## 2022-11-29 PROCEDURE — 4010F PR ACE/ARB THEARPY RXD/TAKEN: ICD-10-PCS | Mod: CPTII,,,

## 2022-11-29 PROCEDURE — 1159F PR MEDICATION LIST DOCUMENTED IN MEDICAL RECORD: ICD-10-PCS | Mod: CPTII,,,

## 2022-11-29 PROCEDURE — 99024 POSTOP FOLLOW-UP VISIT: CPT | Mod: ,,,

## 2022-11-29 PROCEDURE — 3008F PR BODY MASS INDEX (BMI) DOCUMENTED: ICD-10-PCS | Mod: CPTII,,,

## 2022-11-29 PROCEDURE — 1159F MED LIST DOCD IN RCRD: CPT | Mod: CPTII,,,

## 2022-11-29 PROCEDURE — 73110 XR WRIST COMPLETE 3 VIEWS RIGHT: ICD-10-PCS | Mod: 26,RT,, | Performed by: RADIOLOGY

## 2022-11-29 PROCEDURE — 73110 X-RAY EXAM OF WRIST: CPT | Mod: 26,RT,, | Performed by: RADIOLOGY

## 2022-11-29 PROCEDURE — 99024 PR POST-OP FOLLOW-UP VISIT: ICD-10-PCS | Mod: ,,,

## 2022-11-29 PROCEDURE — 73110 X-RAY EXAM OF WRIST: CPT | Mod: TC,RT

## 2022-11-29 PROCEDURE — 99999 PR PBB SHADOW E&M-EST. PATIENT-LVL II: ICD-10-PCS | Mod: PBBFAC,,,

## 2022-11-29 PROCEDURE — 99212 OFFICE O/P EST SF 10 MIN: CPT | Mod: PBBFAC

## 2022-11-29 PROCEDURE — 4010F ACE/ARB THERAPY RXD/TAKEN: CPT | Mod: CPTII,,,

## 2023-01-02 PROBLEM — Z09 HOSPITAL DISCHARGE FOLLOW-UP: Status: RESOLVED | Noted: 2022-09-28 | Resolved: 2023-01-02

## 2023-01-19 ENCOUNTER — TELEPHONE (OUTPATIENT)
Dept: INTERNAL MEDICINE | Facility: CLINIC | Age: 63
End: 2023-01-19
Payer: MEDICAID

## 2023-01-19 NOTE — TELEPHONE ENCOUNTER
----- Message from Marcie Andres sent at 1/19/2023  9:11 AM CST -----  Contact: pt's wife/Meghann  Type:  Sooner Apoointment Request    Caller is requesting a sooner appointment.  Caller declined first available appointment listed below.  Caller will not accept being placed on the waitlist and is requesting a message be sent to doctor.  Name of Caller:Meghann  When is the first available appointment?3/2023  Symptoms: sore on top of head  Would the patient rather a call back or a response via [facility]? phone  Best Call Back Number: [phone]  Additional Information:

## 2023-01-31 ENCOUNTER — OFFICE VISIT (OUTPATIENT)
Dept: INTERNAL MEDICINE | Facility: CLINIC | Age: 63
End: 2023-01-31
Payer: MEDICAID

## 2023-01-31 VITALS
BODY MASS INDEX: 27.14 KG/M2 | HEIGHT: 73 IN | SYSTOLIC BLOOD PRESSURE: 130 MMHG | WEIGHT: 204.81 LBS | TEMPERATURE: 97 F | OXYGEN SATURATION: 96 % | HEART RATE: 84 BPM | DIASTOLIC BLOOD PRESSURE: 80 MMHG

## 2023-01-31 DIAGNOSIS — Z12.11 COLON CANCER SCREENING: ICD-10-CM

## 2023-01-31 DIAGNOSIS — F41.9 ANXIETY: Primary | ICD-10-CM

## 2023-01-31 DIAGNOSIS — I10 ESSENTIAL HYPERTENSION: ICD-10-CM

## 2023-01-31 DIAGNOSIS — L98.9 SKIN LESION OF SCALP: ICD-10-CM

## 2023-01-31 PROBLEM — R60.9 SWELLING: Status: RESOLVED | Noted: 2021-09-27 | Resolved: 2023-01-31

## 2023-01-31 PROBLEM — R55 NEAR SYNCOPE: Status: RESOLVED | Noted: 2021-06-11 | Resolved: 2023-01-31

## 2023-01-31 PROBLEM — R07.9 CHEST PAIN: Status: RESOLVED | Noted: 2022-05-09 | Resolved: 2023-01-31

## 2023-01-31 PROBLEM — R55 SYNCOPE: Status: RESOLVED | Noted: 2022-09-21 | Resolved: 2023-01-31

## 2023-01-31 PROBLEM — R06.02 SOB (SHORTNESS OF BREATH): Status: RESOLVED | Noted: 2022-05-09 | Resolved: 2023-01-31

## 2023-01-31 PROCEDURE — 3075F SYST BP GE 130 - 139MM HG: CPT | Mod: CPTII,,, | Performed by: NURSE PRACTITIONER

## 2023-01-31 PROCEDURE — 99214 OFFICE O/P EST MOD 30 MIN: CPT | Mod: S$PBB,,, | Performed by: NURSE PRACTITIONER

## 2023-01-31 PROCEDURE — 3008F PR BODY MASS INDEX (BMI) DOCUMENTED: ICD-10-PCS | Mod: CPTII,,, | Performed by: NURSE PRACTITIONER

## 2023-01-31 PROCEDURE — 3075F PR MOST RECENT SYSTOLIC BLOOD PRESS GE 130-139MM HG: ICD-10-PCS | Mod: CPTII,,, | Performed by: NURSE PRACTITIONER

## 2023-01-31 PROCEDURE — 1160F RVW MEDS BY RX/DR IN RCRD: CPT | Mod: CPTII,,, | Performed by: NURSE PRACTITIONER

## 2023-01-31 PROCEDURE — 1160F PR REVIEW ALL MEDS BY PRESCRIBER/CLIN PHARMACIST DOCUMENTED: ICD-10-PCS | Mod: CPTII,,, | Performed by: NURSE PRACTITIONER

## 2023-01-31 PROCEDURE — 3079F PR MOST RECENT DIASTOLIC BLOOD PRESSURE 80-89 MM HG: ICD-10-PCS | Mod: CPTII,,, | Performed by: NURSE PRACTITIONER

## 2023-01-31 PROCEDURE — 3008F BODY MASS INDEX DOCD: CPT | Mod: CPTII,,, | Performed by: NURSE PRACTITIONER

## 2023-01-31 PROCEDURE — 1159F PR MEDICATION LIST DOCUMENTED IN MEDICAL RECORD: ICD-10-PCS | Mod: CPTII,,, | Performed by: NURSE PRACTITIONER

## 2023-01-31 PROCEDURE — 99999 PR PBB SHADOW E&M-EST. PATIENT-LVL IV: CPT | Mod: PBBFAC,,, | Performed by: NURSE PRACTITIONER

## 2023-01-31 PROCEDURE — 99999 PR PBB SHADOW E&M-EST. PATIENT-LVL IV: ICD-10-PCS | Mod: PBBFAC,,, | Performed by: NURSE PRACTITIONER

## 2023-01-31 PROCEDURE — 3079F DIAST BP 80-89 MM HG: CPT | Mod: CPTII,,, | Performed by: NURSE PRACTITIONER

## 2023-01-31 PROCEDURE — 99214 PR OFFICE/OUTPT VISIT, EST, LEVL IV, 30-39 MIN: ICD-10-PCS | Mod: S$PBB,,, | Performed by: NURSE PRACTITIONER

## 2023-01-31 PROCEDURE — 99214 OFFICE O/P EST MOD 30 MIN: CPT | Mod: PBBFAC,PO | Performed by: NURSE PRACTITIONER

## 2023-01-31 PROCEDURE — 1159F MED LIST DOCD IN RCRD: CPT | Mod: CPTII,,, | Performed by: NURSE PRACTITIONER

## 2023-01-31 RX ORDER — HYDROXYZINE PAMOATE 25 MG/1
CAPSULE ORAL
Qty: 30 CAPSULE | Refills: 5 | Status: SHIPPED | OUTPATIENT
Start: 2023-01-31 | End: 2023-03-22 | Stop reason: SDUPTHER

## 2023-01-31 NOTE — ASSESSMENT & PLAN NOTE
Ref to derm. Recommended reaching out to medicaid to see who they are in network with if unable to get with [facility].

## 2023-01-31 NOTE — PROGRESS NOTES
Subjective:       Patient ID: Aguilar Rowe is a 62 y.o. male.    Chief Complaint: Mass    Mr. Rowe presents to visit for refill on vistaril. Anxiety has been stable on prozac. Has not had panic episodes recently. Does continue to take vistaril PRN for anxiety, and at night to help with sleep.     Also has sore on top of head that has been there for months. He occasionally scratches it and it bleeds. Denies any hx of skin CA. Does work outside a lot [employer].     Hypertension  This is a chronic problem. The current episode started more than 1 year ago. The problem is controlled. Pertinent negatives include no anxiety, blurred vision, chest pain, headaches, malaise/fatigue, palpitations, peripheral edema or shortness of breath. There are no associated agents to hypertension. Risk factors for coronary artery disease include male gender and obesity. Past treatments include ACE inhibitors. The current treatment provides significant improvement. There are no compliance problems.  There is no history of angina, kidney disease, CAD/MI, CVA, heart failure, left ventricular hypertrophy, PVD or retinopathy.     Patient Active Problem List   Diagnosis    Anxiety    Essential hypertension    Alcohol dependence    Right carpal tunnel syndrome    Pain of right hand    Hiatal hernia    Decreased range of motion    Decreased muscle strength    Abnormal nuclear stress test    Closed fracture of orbit    Hypercholesterolemia    Closed fracture of right wrist    Skin lesion of scalp       Family History   Problem Relation Age of Onset    No Known Problems Mother     Lung cancer Father     No Known Problems Sister      Past Surgical History:   Procedure Laterality Date    APPENDECTOMY      CARPAL TUNNEL RELEASE Right 9/9/2021    Procedure: RELEASE, CARPAL TUNNEL;  Surgeon: Nilton Dubose MD;  Location: [facility];  Service: Orthopedics;  Laterality: Right;  right carpal tunnel release and right long and ring trigger finger  "release    CARPECTOMY Right 11/14/2022    Procedure: CARPECTOMY;  Surgeon: Nilton Dubose MD;  Location: [facility] OR;  Service: Orthopedics;  Laterality: Right;  proximal row carpectomy    FINGER SURGERY Left     SHOULDER SURGERY Right     TRIGGER FINGER RELEASE Right 9/9/2021    Procedure: RELEASE, TRIGGER FINGER;  Surgeon: Nilton Dubose MD;  Location: [facility] OR;  Service: Orthopedics;  Laterality: Right;  right carpal tunnel release and right long and ring trigger finger release         Current Outpatient Medications:     acetaminophen (TYLENOL 8 HOUR ORAL), Take by mouth every 8 (eight) hours as needed., Disp: , Rfl:     aspirin (ECOTRIN) 81 MG EC tablet, Take 81 mg by mouth once daily., Disp: , Rfl:     atorvastatin (LIPITOR) 20 MG tablet, Take 20 mg by mouth once daily., Disp: , Rfl:     FLUoxetine 10 MG capsule, Take 1 capsule (10 mg total) by mouth once daily., Disp: 30 capsule, Rfl: 11    lisinopriL 10 MG tablet, TAKE 1 TABLET(10 MG) BY MOUTH EVERY DAY, Disp: 90 tablet, Rfl: 1    hydrOXYzine pamoate (VISTARIL) 25 MG Cap, TAKE 1 CAPSULE(25 MG) BY MOUTH EVERY 6 HOURS AS NEEDED FOR ANXIETY, Disp: 30 capsule, Rfl: 5    Review of Systems   Constitutional:  Negative for appetite change, chills, fatigue, fever and malaise/fatigue.   Eyes:  Negative for blurred vision and visual disturbance.   Respiratory:  Negative for cough and shortness of breath.    Cardiovascular:  Negative for chest pain and palpitations.   Gastrointestinal:  Negative for abdominal pain, diarrhea, nausea and vomiting.   Neurological:  Negative for dizziness, light-headedness and headaches.   Psychiatric/Behavioral:  Negative for dysphoric mood, self-injury, sleep disturbance and suicidal ideas. The patient is not nervous/anxious.      Objective:   /80 (BP Location: Left arm, Patient Position: Sitting)   Pulse 84   Temp 97 °F (36.1 °C)   Ht 6' 1" (1.854 m)   Wt 92.9 kg (204 lb 12.9 oz)   SpO2 96%   BMI 27.02 kg/m²    " "  Physical Exam  Constitutional:       General: He is not in acute distress.     Appearance: Normal appearance. He is not ill-appearing.   Cardiovascular:      Rate and Rhythm: Normal rate.   Pulmonary:      Effort: Pulmonary effort is normal. No respiratory distress.   Skin:     General: Skin is warm and dry.      Coloration: Skin is not pale.      Findings: No erythema.      Comments: Small pea size ulceration to posterior scalp. No bleeding or drainage present.    Neurological:      Mental Status: He is alert and oriented to person, place, and time.   Psychiatric:         Mood and Affect: Mood normal.         Behavior: Behavior normal.       Assessment & Plan     Problem List Items Addressed This Visit          Psychiatric    Anxiety - Primary    Current Assessment & Plan     Stable on current of prozac. Vistaril prn and HS.          Relevant Medications    hydrOXYzine pamoate (VISTARIL) 25 MG Cap       Derm    Skin lesion of scalp    Current Assessment & Plan     Ref to derm. Recommended reaching out to medicaid to see who they are in network with if [facility]          Relevant Orders    Ambulatory referral/consult to Dermatology       Cardiac/Vascular    Essential hypertension    Current Assessment & Plan     Blood pressure controlled. Continue current medications.           Other Visit Diagnoses       Colon cancer screening        Relevant Orders    Ambulatory referral/consult to Endo Procedure             Follow up if symptoms worsen or fail to improve.          Portions of this note may have been created with voice recognition software. Occasional "wrong-word" or "sound-a-like" substitutions may have occurred due to the inherent limitations of voice recognition software. Please, read the note carefully and recognize, using context, where substitutions have occurred.       "

## 2023-02-06 ENCOUNTER — HOSPITAL ENCOUNTER (OUTPATIENT)
Dept: PREADMISSION TESTING | Facility: HOSPITAL | Age: 63
Discharge: HOME | End: 2023-02-06
Attending: INTERNAL MEDICINE
Payer: MEDICAID

## 2023-02-06 DIAGNOSIS — Z12.11 COLON CANCER SCREENING: Primary | ICD-10-CM

## 2023-02-06 RX ORDER — POLYETHYLENE GLYCOL 3350, SODIUM SULFATE ANHYDROUS, SODIUM BICARBONATE, SODIUM CHLORIDE, POTASSIUM CHLORIDE 236; 22.74; 6.74; 5.86; 2.97 G/4L; G/4L; G/4L; G/4L; G/4L
4 POWDER, FOR SOLUTION ORAL ONCE
Qty: 4000 ML | Refills: 0 | Status: SHIPPED | OUTPATIENT
Start: 2023-02-06 | End: 2023-02-06

## 2023-03-07 ENCOUNTER — ANESTHESIA EVENT (OUTPATIENT)
Dept: ENDOSCOPY | Facility: HOSPITAL | Age: 63
End: 2023-03-07
Payer: MEDICAID

## 2023-03-07 NOTE — ANESTHESIA PREPROCEDURE EVALUATION
03/07/2023  Aguilar Rowe is a 62 y.o., male.    Past Medical History:   Diagnosis Date    Hypertension      Past Surgical History:   Procedure Laterality Date    APPENDECTOMY      CARPAL TUNNEL RELEASE Right 9/9/2021    Procedure: RELEASE, CARPAL TUNNEL;  Surgeon: Nilton Dubose MD;  Location: [facility];  Service: Orthopedics;  Laterality: Right;  right carpal tunnel release and right long and ring trigger finger release    CARPECTOMY Right 11/14/2022    Procedure: CARPECTOMY;  Surgeon: Nilton Dubose MD;  Location: [facility] OR;  Service: Orthopedics;  Laterality: Right;  proximal row carpectomy    FINGER SURGERY Left     SHOULDER SURGERY Right     TRIGGER FINGER RELEASE Right 9/9/2021    Procedure: RELEASE, TRIGGER FINGER;  Surgeon: Nilton Dubose MD;  Location: [facility];  Service: Orthopedics;  Laterality: Right;  right carpal tunnel release and right long and ring trigger finger release       Pre-op Assessment    I have reviewed the Patient Summary Reports.     I have reviewed the Nursing Notes. I have reviewed the NPO Status.   I have reviewed the Medications.     Review of Systems  Anesthesia Hx:  No problems with previous Anesthesia  Denies Family Hx of Anesthesia complications.   Denies Personal Hx of Anesthesia complications.   Social:  Alcohol Use, Non-Smoker Heavy alcohol abuse    Hematology/Oncology:  Hematology Normal   Oncology Normal     EENT/Dental:EENT/Dental Normal   Cardiovascular:   Hypertension Denies MI.  Denies CAD.     Denies Angina. hyperlipidemia ECG has been reviewed. Sinus bradycardia   Incomplete right bundle branch block   Borderline Abnormal ECG   When compared with ECG of 09-MAY-2022 09:47,   No significant change was found   Confirmed by BLANQUITA CORDOBA MD ([id number]) on 11/9/2022 11:50:18 AM    Pulmonary:  Pulmonary Normal    Renal/:  Renal/ Normal      Hepatic/GI:   Bowel Prep. Hiatal Hernia, alcoholic   Musculoskeletal:  Musculoskeletal Normal    Neurological:  Neurology Normal    Endocrine:  Endocrine Normal    Dermatological:  Skin Normal    Psych:   Psychiatric History anxiety          Physical Exam  General: Well nourished, Cooperative, Alert and Oriented    Airway:  Mallampati: II   Mouth Opening: Normal  TM Distance: Normal  Tongue: Normal  Neck ROM: Normal ROM    Dental:  Intact    Chest/Lungs:  Normal Respiratory Rate    Heart:  Rhythm: Regular Rhythm        Anesthesia Plan  Type of Anesthesia, risks & benefits discussed:    Anesthesia Type: Gen Natural Airway  Intra-op Monitoring Plan: Standard ASA Monitors  Induction:  IV  Informed Consent: Informed consent signed with the Patient and all parties understand the risks and agree with anesthesia plan.  All questions answered.   ASA Score: 3  Day of Surgery Review of History & Physical: H&P Update referred to the surgeon/provider.    Ready For Surgery From Anesthesia Perspective.     .

## 2023-03-09 ENCOUNTER — ANESTHESIA (OUTPATIENT)
Dept: ENDOSCOPY | Facility: HOSPITAL | Age: 63
End: 2023-03-09
Payer: MEDICAID

## 2023-03-09 ENCOUNTER — HOSPITAL ENCOUNTER (OUTPATIENT)
Facility: HOSPITAL | Age: 63
Discharge: HOME | End: 2023-03-09
Attending: INTERNAL MEDICINE | Admitting: INTERNAL MEDICINE
Payer: MEDICAID

## 2023-03-09 DIAGNOSIS — K63.5 POLYP OF COLON, UNSPECIFIED PART OF COLON, UNSPECIFIED TYPE: Primary | ICD-10-CM

## 2023-03-09 DIAGNOSIS — K63.5 COLON POLYPS: ICD-10-CM

## 2023-03-09 LAB — POCT GLUCOSE: 102 MG/DL (ref 70–110)

## 2023-03-09 PROCEDURE — D9220A PRA ANESTHESIA: ICD-10-PCS | Mod: ,,, | Performed by: NURSE ANESTHETIST, CERTIFIED REGISTERED

## 2023-03-09 PROCEDURE — 63600175 PHARM REV CODE 636 W HCPCS: Performed by: NURSE ANESTHETIST, CERTIFIED REGISTERED

## 2023-03-09 PROCEDURE — 00811 ANES LWR INTST NDSC NOS: CPT | Performed by: INTERNAL MEDICINE

## 2023-03-09 PROCEDURE — 88305 TISSUE EXAM BY PATHOLOGIST: CPT | Mod: 26,,, | Performed by: STUDENT IN AN ORGANIZED HEALTH CARE EDUCATION/TRAINING PROGRAM

## 2023-03-09 PROCEDURE — 45380 COLONOSCOPY AND BIOPSY: CPT | Performed by: INTERNAL MEDICINE

## 2023-03-09 PROCEDURE — 45380 PR COLONOSCOPY,BIOPSY: ICD-10-PCS | Mod: ,,, | Performed by: INTERNAL MEDICINE

## 2023-03-09 PROCEDURE — 88305 TISSUE EXAM BY PATHOLOGIST: ICD-10-PCS | Mod: 26,,, | Performed by: STUDENT IN AN ORGANIZED HEALTH CARE EDUCATION/TRAINING PROGRAM

## 2023-03-09 PROCEDURE — 25000003 PHARM REV CODE 250: Performed by: NURSE ANESTHETIST, CERTIFIED REGISTERED

## 2023-03-09 PROCEDURE — 25000003 PHARM REV CODE 250: Performed by: INTERNAL MEDICINE

## 2023-03-09 PROCEDURE — 37000008 HC ANESTHESIA 1ST 15 MINUTES: Performed by: INTERNAL MEDICINE

## 2023-03-09 PROCEDURE — 27201012 HC FORCEPS, HOT/COLD, DISP: Performed by: INTERNAL MEDICINE

## 2023-03-09 PROCEDURE — 88305 TISSUE EXAM BY PATHOLOGIST: CPT | Performed by: STUDENT IN AN ORGANIZED HEALTH CARE EDUCATION/TRAINING PROGRAM

## 2023-03-09 PROCEDURE — 37000009 HC ANESTHESIA EA ADD 15 MINS: Performed by: INTERNAL MEDICINE

## 2023-03-09 PROCEDURE — D9220A PRA ANESTHESIA: Mod: ,,, | Performed by: NURSE ANESTHETIST, CERTIFIED REGISTERED

## 2023-03-09 PROCEDURE — 63600175 PHARM REV CODE 636 W HCPCS: Performed by: INTERNAL MEDICINE

## 2023-03-09 PROCEDURE — 45380 COLONOSCOPY AND BIOPSY: CPT | Mod: ,,, | Performed by: INTERNAL MEDICINE

## 2023-03-09 RX ORDER — LIDOCAINE HYDROCHLORIDE 20 MG/ML
INJECTION, SOLUTION EPIDURAL; INFILTRATION; INTRACAUDAL; PERINEURAL
Status: DISCONTINUED | OUTPATIENT
Start: 2023-03-09 | End: 2023-03-09

## 2023-03-09 RX ORDER — PHENYLEPHRINE HYDROCHLORIDE 10 MG/ML
INJECTION INTRAVENOUS
Status: DISCONTINUED | OUTPATIENT
Start: 2023-03-09 | End: 2023-03-09

## 2023-03-09 RX ORDER — PROPOFOL 10 MG/ML
VIAL (ML) INTRAVENOUS
Status: DISCONTINUED | OUTPATIENT
Start: 2023-03-09 | End: 2023-03-09

## 2023-03-09 RX ORDER — SODIUM CHLORIDE, SODIUM LACTATE, POTASSIUM CHLORIDE, CALCIUM CHLORIDE 600; 310; 30; 20 MG/100ML; MG/100ML; MG/100ML; MG/100ML
INJECTION, SOLUTION INTRAVENOUS CONTINUOUS
Status: DISCONTINUED | OUTPATIENT
Start: 2023-03-09 | End: 2023-03-09 | Stop reason: HOSPADM

## 2023-03-09 RX ORDER — MIDAZOLAM HYDROCHLORIDE 1 MG/ML
INJECTION, SOLUTION INTRAMUSCULAR; INTRAVENOUS
Status: DISCONTINUED | OUTPATIENT
Start: 2023-03-09 | End: 2023-03-09

## 2023-03-09 RX ORDER — EPHEDRINE SULFATE 50 MG/ML
INJECTION, SOLUTION INTRAVENOUS
Status: DISCONTINUED | OUTPATIENT
Start: 2023-03-09 | End: 2023-03-09

## 2023-03-09 RX ORDER — DEXTROMETHORPHAN/PSEUDOEPHED 2.5-7.5/.8
DROPS ORAL
Status: DISCONTINUED | OUTPATIENT
Start: 2023-03-09 | End: 2023-03-09 | Stop reason: HOSPADM

## 2023-03-09 RX ADMIN — MIDAZOLAM 2 MG: 1 INJECTION INTRAMUSCULAR; INTRAVENOUS at 07:03

## 2023-03-09 RX ADMIN — GLYCOPYRROLATE 0.2 MG: 0.2 INJECTION, SOLUTION INTRAMUSCULAR; INTRAVITREAL at 08:03

## 2023-03-09 RX ADMIN — PHENYLEPHRINE HYDROCHLORIDE 50 MCG: 10 INJECTION INTRAVENOUS at 08:03

## 2023-03-09 RX ADMIN — EPHEDRINE SULFATE 5 MG: 50 INJECTION INTRAVENOUS at 08:03

## 2023-03-09 RX ADMIN — PROPOFOL 30 MG: 10 INJECTION, EMULSION INTRAVENOUS at 07:03

## 2023-03-09 RX ADMIN — PROPOFOL 50 MG: 10 INJECTION, EMULSION INTRAVENOUS at 07:03

## 2023-03-09 RX ADMIN — SODIUM CHLORIDE, POTASSIUM CHLORIDE, SODIUM LACTATE AND CALCIUM CHLORIDE: 600; 310; 30; 20 INJECTION, SOLUTION INTRAVENOUS at 07:03

## 2023-03-09 RX ADMIN — LIDOCAINE HYDROCHLORIDE 20 MG: 20 INJECTION, SOLUTION EPIDURAL; INFILTRATION; INTRACAUDAL; PERINEURAL at 07:03

## 2023-03-09 RX ADMIN — EPHEDRINE SULFATE 10 MG: 50 INJECTION INTRAVENOUS at 08:03

## 2023-03-09 NOTE — PROVATION PATIENT INSTRUCTIONS
Discharge Summary/Instructions after an Endoscopic Procedure  Patient Name: Aguilar Rowe  Patient MRN: [id number]  Patient YOB: 1960  Thursday, March 9, 2023  Hailey Johnson MD  Dear patient,  As a result of recent federal legislation (The Federal Cures Act), you may   receive lab or pathology results from your procedure in your [facility]   account before your physician is able to contact you. Your physician or   their representative will relay the results to you with their   recommendations at their soonest availability.  Thank you,  RESTRICTIONS:  During your procedure today, you received medications for sedation.  These   medications may affect your judgment, balance and coordination.  Therefore,   for 24 hours, you have the following restrictions:   - DO NOT drive a car, operate machinery, make legal/financial decisions,   sign important papers or drink alcohol.    ACTIVITY:  Today: no heavy lifting, straining or running due to procedural   sedation/anesthesia.  The following day: return to full activity including work.  DIET:  Eat and drink normally unless instructed otherwise.     TREATMENT FOR COMMON SIDE EFFECTS:  - Mild abdominal pain, nausea, belching, bloating or excessive gas:  rest,   eat lightly and use a heating pad.  - Sore Throat: treat with throat lozenges and/or gargle with warm salt   water.  - Because air was used during the procedure, expelling large amounts of air   from your rectum or belching is normal.  - If a bowel prep was taken, you may not have a bowel movement for 1-3 days.    This is normal.  SYMPTOMS TO WATCH FOR AND REPORT TO YOUR PHYSICIAN:  1. Abdominal pain or bloating, other than gas cramps.  2. Chest pain.  3. Back pain.  4. Signs of infection such as: chills or fever occurring within 24 hours   after the procedure.  5. Rectal bleeding, which would show as bright red, maroon, or black stools.   (A tablespoon of blood from the rectum is not serious, especially  if   hemorrhoids are present.)  6. Vomiting.  7. Weakness or dizziness.  GO DIRECTLY TO THE NEAREST EMERGENCY ROOM IF YOU HAVE ANY OF THE FOLLOWING:      Difficulty breathing              Chills and/or fever over 101 F   Persistent vomiting and/or vomiting blood   Severe abdominal pain   Severe chest pain   Black, tarry stools   Bleeding- more than one tablespoon   Any other symptom or condition that you feel may need urgent attention  Your doctor recommends these additional instructions:  If any biopsies were taken, your doctors clinic will contact you in 1 to 2   weeks with any results.  - Discharge patient to home.   - Resume previous diet.   - Continue present medications.   - Await pathology results.   - Repeat colonoscopy in 5 years for surveillance.   - Return to referring physician.   - Patient has a contact number available for emergencies.  The signs and   symptoms of potential delayed complications were discussed with the   patient.  Return to normal activities tomorrow.  Written discharge   instructions were provided to the patient.  For questions, problems or results please call your physician Hailey Johnson MD at Work:  [phone]  If you have any questions about the above instructions, call the GI   department at [phone] or call the endoscopy unit at [phone]   from 7am until 3 pm.  [facility] [place], EMERGENCY ROOM PHONE NUMBER:   [phone]  IF A COMPLICATION OR EMERGENCY SITUATION ARISES AND YOU ARE UNABLE TO REACH   YOUR PHYSICIAN - GO DIRECTLY TO THE EMERGENCY ROOM.  I have read or have had read to me these discharge instructions for my   procedure and have received a written copy.  I understand these   instructions and will follow-up with my physician if I have any questions.     __________________________________       _____________________________________  Nurse Signature                                          Patient/Designated   Responsible  Party Signature  Hailey Johnson MD  3/9/2023 8:07:29 AM  This report has been verified and signed electronically.  Dear patient,  As a result of recent federal legislation (The Federal Cures Act), you may   receive lab or pathology results from your procedure in your [facility]   account before your physician is able to contact you. Your physician or   their representative will relay the results to you with their   recommendations at their soonest availability.  Thank you,  PROVATION

## 2023-03-09 NOTE — ANESTHESIA POSTPROCEDURE EVALUATION
Anesthesia Post Evaluation    Patient: Aguilar Rowe    Procedure(s) Performed: Procedure(s) (LRB):  COLONOSCOPY (N/A)    Final Anesthesia Type: general      Patient location during evaluation: PACU  Patient participation: Yes- Able to Participate  Level of consciousness: awake and alert and oriented  Post-procedure vital signs: reviewed and stable  Pain management: adequate  Airway patency: patent    PONV status at discharge: No PONV  Anesthetic complications: no      Cardiovascular status: blood pressure returned to baseline, stable and hemodynamically stable  Respiratory status: unassisted  Hydration status: euvolemic  Follow-up not needed.          Vitals Value Taken Time   /77 03/09/23 0835   Temp 36.7 °C (98 °F) 03/09/23 0835   Pulse 66 03/09/23 0835   Resp 18 03/09/23 0835   SpO2 99 % 03/09/23 0835         No case tracking events are documented in the log.      Pain/Elieser Score: Elieser Score: 10 (3/9/2023  8:35 AM)  Modified Elieser Score: 20 (3/9/2023  8:35 AM)

## 2023-03-09 NOTE — TRANSFER OF CARE
"Anesthesia Transfer of Care Note    Patient: Aguilar Rowe    Procedure(s) Performed: Procedure(s) (LRB):  COLONOSCOPY (N/A)    Patient location: PACU    Anesthesia Type: general    Transport from OR: Transported from OR on room air with adequate spontaneous ventilation    Post pain: adequate analgesia    Post assessment: no apparent anesthetic complications and tolerated procedure well    Post vital signs: stable    Level of consciousness: awake and alert    Nausea/Vomiting: no nausea/vomiting    Complications: none    Transfer of care protocol was followed      Last vitals:   Visit Vitals  BP (!) 107/59 (BP Location: Right arm, Patient Position: Sitting)   Pulse 79   Temp 36.1 °C (97 °F) (Temporal)   Resp 18   Ht 6' 1" (1.854 m)   Wt 89.9 kg (198 lb 4.9 oz)   SpO2 97%   BMI 26.16 kg/m²     "

## 2023-03-09 NOTE — H&P
PRE PROCEDURE H&P    Patient Name: Aguilar Rowe  MRN: [id number]  : 1960  Date of Procedure:  3/9/2023  Referring Physician: Rocio Suh NP  Primary Physician: Rocio Newman NP  Procedure Physician: Hailey Johnson MD       Planned Procedure: Colonoscopy  Diagnosis: previous adenomatous polyp  Chief Complaint: Same as above    HPI: Patient is an 62 y.o. male is here for the above.     Last colonoscopy: 5 years ago  Family history: Uncle with colon cancer   Anticoagulation: None     Past Medical History:   Past Medical History:   Diagnosis Date    Hypertension         Past Surgical History:  Past Surgical History:   Procedure Laterality Date    APPENDECTOMY      CARPAL TUNNEL RELEASE Right 2021    Procedure: RELEASE, CARPAL TUNNEL;  Surgeon: Nilton Dubose MD;  Location: [facility] OR;  Service: Orthopedics;  Laterality: Right;  right carpal tunnel release and right long and ring trigger finger release    CARPECTOMY Right 2022    Procedure: CARPECTOMY;  Surgeon: Nilton Dubose MD;  Location: [facility] OR;  Service: Orthopedics;  Laterality: Right;  proximal row carpectomy    FINGER SURGERY Left     SHOULDER SURGERY Right     TRIGGER FINGER RELEASE Right 2021    Procedure: RELEASE, TRIGGER FINGER;  Surgeon: Nilton Dubose MD;  Location: [facility] OR;  Service: Orthopedics;  Laterality: Right;  right carpal tunnel release and right long and ring trigger finger release        Home Medications:  Prior to Admission medications    Medication Sig Start Date End Date Taking? Authorizing Provider   acetaminophen (TYLENOL 8 HOUR ORAL) Take by mouth every 8 (eight) hours as needed.   Yes Historical Provider   aspirin (ECOTRIN) 81 MG EC tablet Take 81 mg by mouth once daily.   Yes Historical Provider   atorvastatin (LIPITOR) 20 MG tablet Take 20 mg by mouth once daily. 22  Yes Historical Provider   FLUoxetine 10 MG capsule Take 1 capsule (10 mg total) by mouth once daily.  "10/26/22 10/26/23 Yes Rocio Newman NP   hydrOXYzine pamoate (VISTARIL) 25 MG Cap TAKE 1 CAPSULE(25 MG) BY MOUTH EVERY 6 HOURS AS NEEDED FOR ANXIETY 1/31/23  Yes Rocio Newman NP   lisinopriL 10 MG tablet TAKE 1 TABLET(10 MG) BY MOUTH EVERY DAY 12/21/22  Yes Rocio Newman NP        Allergies:  Review of patient's allergies indicates:   Allergen Reactions    Morphine Other (See Comments)     Visual hallucinations        Social History:   Social History     Socioeconomic History    Marital status: Single   Tobacco Use    Smoking status: Never    Smokeless tobacco: Never   Substance and Sexual Activity    Alcohol use: Yes     Alcohol/week: 4.0 standard drinks     Types: 4 Cans of beer per week     Comment: Drinks "a few beers in the evening".    Drug use: Never    Sexual activity: Yes       Family History:  Family History   Problem Relation Age of Onset    No Known Problems Mother     Lung cancer Father     No Known Problems Sister        ROS: No acute cardiac events, no acute respiratory complaints.     Physical Exam (all patients):    /74 (BP Location: Right arm, Patient Position: Sitting)   Pulse 73   Temp 97.9 °F (36.6 °C) (Temporal)   Resp 18   Ht 6' 1" (1.854 m)   Wt 89.9 kg (198 lb 4.9 oz)   SpO2 99%   BMI 26.16 kg/m²   Lungs: Clear to auscultation bilaterally, respirations unlabored  Heart: Regular rate and rhythm, S1 and S2 normal, no obvious murmurs  Abdomen:         Soft, non-tender, bowel sounds normal, no masses, no organomegaly    Lab Results   Component Value Date    WBC 6.16 11/09/2022    MCV 99 (H) 11/09/2022    RDW 11.7 11/09/2022     11/09/2022    GLU 95 11/09/2022    HGBA1C 5.1 08/24/2020    BUN 14 11/09/2022     11/09/2022    K 4.5 11/09/2022     11/09/2022        SEDATION PLAN: per anesthesia      History reviewed, vital signs satisfactory, cardiopulmonary status satisfactory, sedation options, risks and plans have been discussed with the patient  " All their questions were answered and the patient agrees to the sedation procedures as planned and the patient is deemed an appropriate candidate for the sedation as planned.    Procedure explained to patient, informed consent obtained and placed in chart.    Hailey Johnson  3/9/2023  7:42 AM

## 2023-03-15 LAB
FINAL PATHOLOGIC DIAGNOSIS: NORMAL
Lab: NORMAL

## 2023-03-16 VITALS
SYSTOLIC BLOOD PRESSURE: 118 MMHG | HEIGHT: 73 IN | RESPIRATION RATE: 18 BRPM | WEIGHT: 198.31 LBS | HEART RATE: 66 BPM | DIASTOLIC BLOOD PRESSURE: 77 MMHG | TEMPERATURE: 98 F | BODY MASS INDEX: 26.28 KG/M2 | OXYGEN SATURATION: 99 %

## 2023-06-15 DIAGNOSIS — I10 ESSENTIAL HYPERTENSION: ICD-10-CM

## 2023-06-15 RX ORDER — LISINOPRIL 10 MG/1
TABLET ORAL
Qty: 90 TABLET | Refills: 1 | Status: SHIPPED | OUTPATIENT
Start: 2023-06-15 | End: 2023-11-27 | Stop reason: SDUPTHER

## 2023-07-03 ENCOUNTER — TELEPHONE (OUTPATIENT)
Dept: FAMILY MEDICINE | Facility: CLINIC | Age: 63
End: 2023-07-03
Payer: MEDICAID

## 2023-07-03 NOTE — TELEPHONE ENCOUNTER
----- Message from Priscilla Vance sent at 7/3/2023  1:53 PM CDT -----  Contact: Aguilar  Type:  Sooner Apoointment Request    Caller is requesting a sooner appointment.  Caller will not accept being placed on the waitlist and is requesting a message be sent to doctor.  Name of Caller: Aguilar  When is the first available appointment?unknown  Symptoms:Dizziness upon standing  Would the patient rather a call back or a response via [facility]? call  Best Call Back Number: [phone]   Additional Information: Patient request to schedule soonest available appointment. Please call patient back to assist.

## 2023-07-05 ENCOUNTER — OFFICE VISIT (OUTPATIENT)
Dept: INTERNAL MEDICINE | Facility: CLINIC | Age: 63
End: 2023-07-05
Payer: MEDICAID

## 2023-07-05 ENCOUNTER — HOSPITAL ENCOUNTER (OUTPATIENT)
Dept: RADIOLOGY | Facility: HOSPITAL | Age: 63
Discharge: HOME | End: 2023-07-05
Attending: NURSE PRACTITIONER
Payer: MEDICAID

## 2023-07-05 VITALS
HEART RATE: 108 BPM | HEIGHT: 73 IN | WEIGHT: 201.75 LBS | TEMPERATURE: 98 F | OXYGEN SATURATION: 96 % | SYSTOLIC BLOOD PRESSURE: 124 MMHG | DIASTOLIC BLOOD PRESSURE: 70 MMHG | BODY MASS INDEX: 26.74 KG/M2

## 2023-07-05 DIAGNOSIS — R42 DIZZINESS: ICD-10-CM

## 2023-07-05 DIAGNOSIS — M54.2 NECK PAIN: ICD-10-CM

## 2023-07-05 DIAGNOSIS — R20.0 NUMBNESS AND TINGLING IN BOTH HANDS: ICD-10-CM

## 2023-07-05 DIAGNOSIS — Z12.5 PROSTATE CANCER SCREENING: ICD-10-CM

## 2023-07-05 DIAGNOSIS — R42 DIZZINESS: Primary | ICD-10-CM

## 2023-07-05 DIAGNOSIS — R20.2 NUMBNESS AND TINGLING IN BOTH HANDS: ICD-10-CM

## 2023-07-05 PROCEDURE — 99999 PR PBB SHADOW E&M-EST. PATIENT-LVL IV: CPT | Mod: PBBFAC,,, | Performed by: NURSE PRACTITIONER

## 2023-07-05 PROCEDURE — 4010F PR ACE/ARB THEARPY RXD/TAKEN: ICD-10-PCS | Mod: CPTII,,, | Performed by: NURSE PRACTITIONER

## 2023-07-05 PROCEDURE — 3008F PR BODY MASS INDEX (BMI) DOCUMENTED: ICD-10-PCS | Mod: CPTII,,, | Performed by: NURSE PRACTITIONER

## 2023-07-05 PROCEDURE — 72040 XR CERVICAL SPINE 2 OR 3 VIEWS: ICD-10-PCS | Mod: 26,,, | Performed by: RADIOLOGY

## 2023-07-05 PROCEDURE — 99214 OFFICE O/P EST MOD 30 MIN: CPT | Mod: PBBFAC,PO | Performed by: NURSE PRACTITIONER

## 2023-07-05 PROCEDURE — 72040 X-RAY EXAM NECK SPINE 2-3 VW: CPT | Mod: TC,PO

## 2023-07-05 PROCEDURE — 3078F DIAST BP <80 MM HG: CPT | Mod: CPTII,,, | Performed by: NURSE PRACTITIONER

## 2023-07-05 PROCEDURE — 1160F PR REVIEW ALL MEDS BY PRESCRIBER/CLIN PHARMACIST DOCUMENTED: ICD-10-PCS | Mod: CPTII,,, | Performed by: NURSE PRACTITIONER

## 2023-07-05 PROCEDURE — 1159F PR MEDICATION LIST DOCUMENTED IN MEDICAL RECORD: ICD-10-PCS | Mod: CPTII,,, | Performed by: NURSE PRACTITIONER

## 2023-07-05 PROCEDURE — 4010F ACE/ARB THERAPY RXD/TAKEN: CPT | Mod: CPTII,,, | Performed by: NURSE PRACTITIONER

## 2023-07-05 PROCEDURE — 3008F BODY MASS INDEX DOCD: CPT | Mod: CPTII,,, | Performed by: NURSE PRACTITIONER

## 2023-07-05 PROCEDURE — 99999 PR PBB SHADOW E&M-EST. PATIENT-LVL IV: ICD-10-PCS | Mod: PBBFAC,,, | Performed by: NURSE PRACTITIONER

## 2023-07-05 PROCEDURE — 1159F MED LIST DOCD IN RCRD: CPT | Mod: CPTII,,, | Performed by: NURSE PRACTITIONER

## 2023-07-05 PROCEDURE — 72040 X-RAY EXAM NECK SPINE 2-3 VW: CPT | Mod: 26,,, | Performed by: RADIOLOGY

## 2023-07-05 PROCEDURE — 99214 OFFICE O/P EST MOD 30 MIN: CPT | Mod: S$PBB,,, | Performed by: NURSE PRACTITIONER

## 2023-07-05 PROCEDURE — 3078F PR MOST RECENT DIASTOLIC BLOOD PRESSURE < 80 MM HG: ICD-10-PCS | Mod: CPTII,,, | Performed by: NURSE PRACTITIONER

## 2023-07-05 PROCEDURE — 3074F SYST BP LT 130 MM HG: CPT | Mod: CPTII,,, | Performed by: NURSE PRACTITIONER

## 2023-07-05 PROCEDURE — 99214 PR OFFICE/OUTPT VISIT, EST, LEVL IV, 30-39 MIN: ICD-10-PCS | Mod: S$PBB,,, | Performed by: NURSE PRACTITIONER

## 2023-07-05 PROCEDURE — 3074F PR MOST RECENT SYSTOLIC BLOOD PRESSURE < 130 MM HG: ICD-10-PCS | Mod: CPTII,,, | Performed by: NURSE PRACTITIONER

## 2023-07-05 PROCEDURE — 1160F RVW MEDS BY RX/DR IN RCRD: CPT | Mod: CPTII,,, | Performed by: NURSE PRACTITIONER

## 2023-07-05 NOTE — PROGRESS NOTES
Subjective:       Patient ID: Aguilar oRwe is a 63 y.o. male.    Chief Complaint: Dizziness    Mr. Rowe presents to visit for complaint of dizziness. Onset approx 1 month. Had episode where he fell due to symptoms while getting up out of bed.  Occurs when he is getting up out of bed, getting up too fast, stretching, or looking up.    Has also been having tingling to bilateral hands, and intermittent numbness to L upper ext. Tingling is worse at night.      Dizziness:   Chronicity:  New  Onset:  More than 1 month ago  Progression since onset:  Waxing and waning and gradually worsening  Pain Scale:  0/10  Duration:  1 minute  Dizziness characteristics:  Off-balance and lightheaded/impending faint   Associated symptoms: visual disturbances and extremity numbness (intermittent, worse at night).no hearing loss, no ear congestion, no ear pain, no fever, no headaches, no tinnitus, no nausea, no vomiting, no diaphoresis, no aural fullness, no weakness, no light-headedness, no syncope, no palpitations, no panic, no facial weakness, no slurred speech and no chest pain.  Aggravated by:  Getting up, position changes and bending  Treatments tried:  Body position changes and rest  Improvements on treatment:  Variableno strokes, no cardiac surgery, no neurologic disease, no head trauma and no head trauma.    Patient Active Problem List   Diagnosis    Anxiety    Essential hypertension    Alcohol dependence    Right carpal tunnel syndrome    Pain of right hand    Hiatal hernia    Decreased range of motion    Decreased muscle strength    Abnormal nuclear stress test    Closed fracture of orbit    Hypercholesterolemia    Closed fracture of right wrist    Skin lesion of scalp       Family History   Problem Relation Age of Onset    No Known Problems Mother     Lung cancer Father     No Known Problems Sister      Past Surgical History:   Procedure Laterality Date    APPENDECTOMY      CARPAL TUNNEL RELEASE Right 9/9/2021    Procedure:  RELEASE, CARPAL TUNNEL;  Surgeon: Nilton Dubose MD;  Location: [facility] OR;  Service: Orthopedics;  Laterality: Right;  right carpal tunnel release and right long and ring trigger finger release    CARPECTOMY Right 11/14/2022    Procedure: CARPECTOMY;  Surgeon: Nilton Dubose MD;  Location: [facility] OR;  Service: Orthopedics;  Laterality: Right;  proximal row carpectomy    COLONOSCOPY N/A 3/9/2023    Procedure: COLONOSCOPY;  Surgeon: Hailey Johnson MD;  Location: [facility] ENDO;  Service: Endoscopy;  Laterality: N/A;    FINGER SURGERY Left     SHOULDER SURGERY Right     TRIGGER FINGER RELEASE Right 9/9/2021    Procedure: RELEASE, TRIGGER FINGER;  Surgeon: Nilton Dubose MD;  Location: [facility] OR;  Service: Orthopedics;  Laterality: Right;  right carpal tunnel release and right long and ring trigger finger release         Current Outpatient Medications:     acetaminophen (TYLENOL 8 HOUR ORAL), Take by mouth every 8 (eight) hours as needed., Disp: , Rfl:     aspirin (ECOTRIN) 81 MG EC tablet, Take 81 mg by mouth once daily., Disp: , Rfl:     atorvastatin (LIPITOR) 20 MG tablet, Take 20 mg by mouth once daily., Disp: , Rfl:     FLUoxetine 10 MG capsule, TAKE 1 CAPSULE(10 MG) BY MOUTH EVERY DAY, Disp: 30 capsule, Rfl: 11    hydrOXYzine pamoate (VISTARIL) 25 MG Cap, TAKE 1 CAPSULE(25 MG) BY MOUTH EVERY 6 HOURS AS NEEDED FOR ANXIETY, Disp: 30 capsule, Rfl: 5    lisinopriL 10 MG tablet, TAKE 1 TABLET(10 MG) BY MOUTH EVERY DAY, Disp: 90 tablet, Rfl: 1    Review of Systems   Constitutional:  Negative for appetite change, chills, diaphoresis, fatigue and fever.   HENT:  Negative for congestion, ear pain, hearing loss, postnasal drip, rhinorrhea, sinus pressure and tinnitus.    Eyes:  Positive for visual disturbance (blurred during episodes).   Respiratory:  Negative for cough and shortness of breath.    Cardiovascular:  Negative for chest pain, palpitations and syncope.   Gastrointestinal:  Negative for  "abdominal pain, blood in stool, constipation, diarrhea, nausea and vomiting.   Endocrine: Negative for polydipsia, polyphagia and polyuria.   Genitourinary:  Negative for dysuria, frequency and urgency.   Neurological:  Positive for dizziness and numbness. Negative for tremors, seizures, syncope, facial asymmetry, speech difficulty, weakness, light-headedness and headaches.     Objective:   /70 (BP Location: Left arm, Patient Position: Sitting, BP Method: Medium (Manual))   Pulse 108   Temp 98.2 °F (36.8 °C) (Temporal)   Ht 6' 1" (1.854 m)   Wt 91.5 kg (201 lb 11.5 oz)   SpO2 96%   BMI 26.61 kg/m²      Physical Exam  Constitutional:       General: He is not in acute distress.     Appearance: Normal appearance. He is not ill-appearing.   Eyes:      General:         Right eye: No discharge.         Left eye: No discharge.      Extraocular Movements: Extraocular movements intact.      Conjunctiva/sclera: Conjunctivae normal.      Pupils: Pupils are equal, round, and reactive to light.   Cardiovascular:      Rate and Rhythm: Normal rate and regular rhythm.      Pulses: Normal pulses.      Heart sounds: Normal heart sounds. No murmur heard.    No friction rub. No gallop.   Pulmonary:      Effort: Pulmonary effort is normal. No respiratory distress.      Breath sounds: Normal breath sounds. No wheezing.   Musculoskeletal:      Cervical back: Neck supple. Tenderness present.      Right lower leg: No edema.      Left lower leg: No edema.   Lymphadenopathy:      Cervical: No cervical adenopathy.   Skin:     General: Skin is warm and dry.      Coloration: Skin is not pale.      Findings: No erythema.   Neurological:      Mental Status: He is alert and oriented to person, place, and time.      GCS: GCS eye subscore is 4. GCS verbal subscore is 5. GCS motor subscore is 6.      Cranial Nerves: No dysarthria or facial asymmetry.      Motor: No weakness, tremor or pronator drift.      Coordination: Coordination is " "intact.      Gait: Gait is intact.   Psychiatric:         Mood and Affect: Mood normal.         Behavior: Behavior normal.       Assessment & Plan     Problem List Items Addressed This Visit    None  Visit Diagnoses       Dizziness    -  Primary    Relevant Orders    TSH (Completed)    X-Ray Cervical Spine 2 or 3 Views (Completed)    CBC Auto Differential (Completed)    COMPREHENSIVE METABOLIC PANEL (Completed)    Numbness and tingling in both hands        Relevant Orders    X-Ray Cervical Spine 2 or 3 Views (Completed)    Neck pain        Relevant Orders    X-Ray Cervical Spine 2 or 3 Views (Completed)    Prostate cancer screening        Relevant Orders    PSA, SCREENING (Completed)        No concerns on physical exam. Cervical xray today. Discussed option for MRI neck and/or US carotid in future if dizziness continues. Also discussed option for PT to see if it helps with tingling/numbness/ and neck pain.       Follow up if symptoms worsen or fail to improve.            Portions of this note may have been created with voice recognition software. Occasional "wrong-word" or "sound-a-like" substitutions may have occurred due to the inherent limitations of voice recognition software. Please, read the note carefully and recognize, using context, where substitutions have occurred.       "

## 2023-07-10 ENCOUNTER — TELEPHONE (OUTPATIENT)
Dept: INTERNAL MEDICINE | Facility: CLINIC | Age: 63
End: 2023-07-10
Payer: MEDICAID

## 2023-07-10 DIAGNOSIS — R20.0 NUMBNESS AND TINGLING IN BOTH HANDS: ICD-10-CM

## 2023-07-10 DIAGNOSIS — R20.2 NUMBNESS AND TINGLING IN BOTH HANDS: ICD-10-CM

## 2023-07-10 DIAGNOSIS — M54.2 NECK PAIN: Primary | ICD-10-CM

## 2023-07-10 NOTE — TELEPHONE ENCOUNTER
----- Message from Rocio Newman NP sent at 7/6/2023  8:46 AM CDT -----  Multiple areas of arthritic changes. If symptoms continue to worsen or do not improve, I recommend MRI, possible PT, or possible pain management referral. Let me know if you have any questions.

## 2023-07-10 NOTE — TELEPHONE ENCOUNTER
Patient returned call to office for result. Result was given and patient would like to try physical therapy

## 2023-07-10 NOTE — TELEPHONE ENCOUNTER
----- Message from Stefan Reyes sent at 7/10/2023  3:07 PM CDT -----  Contact: Aguilar  Type:  Test Results    Who Called:  Aguilar   Name of Test (Lab/Mammo/Etc): labs & Xray  Date of Test: 7/5/23  Ordering Provider:  Rocio Marte   Where the test was performed: [facility]   Would the patient rather a call back or a response via [facility]? Call back   Best Call Back Number: [phone]  Additional Information:    Thanks   Am

## 2023-07-11 DIAGNOSIS — R79.89 ELEVATED LFTS: Primary | ICD-10-CM

## 2023-07-25 ENCOUNTER — LAB VISIT (OUTPATIENT)
Dept: LAB | Facility: HOSPITAL | Age: 63
End: 2023-07-25
Attending: NURSE PRACTITIONER
Payer: MEDICAID

## 2023-07-25 DIAGNOSIS — R79.89 ELEVATED LFTS: ICD-10-CM

## 2023-07-25 LAB
ALBUMIN SERPL BCP-MCNC: 3.9 G/DL (ref 3.5–5.2)
ALP SERPL-CCNC: 72 U/L (ref 55–135)
ALT SERPL W/O P-5'-P-CCNC: 48 U/L (ref 10–44)
AST SERPL-CCNC: 38 U/L (ref 10–40)
BILIRUB DIRECT SERPL-MCNC: 0.3 MG/DL (ref 0.1–0.3)
BILIRUB SERPL-MCNC: 0.9 MG/DL (ref 0.1–1)
PROT SERPL-MCNC: 6.9 G/DL (ref 6–8.4)

## 2023-07-25 PROCEDURE — 36415 COLL VENOUS BLD VENIPUNCTURE: CPT | Mod: PO | Performed by: NURSE PRACTITIONER

## 2023-07-25 PROCEDURE — 80076 HEPATIC FUNCTION PANEL: CPT | Mod: PO | Performed by: NURSE PRACTITIONER

## 2023-07-31 ENCOUNTER — TELEPHONE (OUTPATIENT)
Dept: INTERNAL MEDICINE | Facility: CLINIC | Age: 63
End: 2023-07-31
Payer: MEDICAID

## 2023-07-31 NOTE — TELEPHONE ENCOUNTER
----- Message from Lelia Vides sent at 7/31/2023 12:11 PM CDT -----  Name of Who is Calling:Patient           What is the request in detail:  Patient completed blood work on 07/25 and has not heard anything back yet.         Can the clinic reply by [facility]:no           [facility]: [phone]

## 2023-07-31 NOTE — TELEPHONE ENCOUNTER
Returned call to patient , result was given and understanding. Patient informed should be getting a letter

## 2023-08-28 ENCOUNTER — HOSPITAL ENCOUNTER (EMERGENCY)
Facility: HOSPITAL | Age: 63
Discharge: HOME | End: 2023-08-28
Attending: EMERGENCY MEDICINE
Payer: MEDICAID

## 2023-08-28 VITALS
WEIGHT: 191.81 LBS | BODY MASS INDEX: 25.3 KG/M2 | HEART RATE: 87 BPM | OXYGEN SATURATION: 99 % | TEMPERATURE: 98 F | DIASTOLIC BLOOD PRESSURE: 84 MMHG | SYSTOLIC BLOOD PRESSURE: 146 MMHG | RESPIRATION RATE: 16 BRPM

## 2023-08-28 DIAGNOSIS — M25.531 ACUTE PAIN OF RIGHT WRIST: Primary | ICD-10-CM
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
## 2023-08-28 PROCEDURE — 99283 EMERGENCY DEPT VISIT LOW MDM: CPT | Mod: ER

## 2023-08-28 PROCEDURE — 25000003 PHARM REV CODE 250: Mod: ER | Performed by: EMERGENCY MEDICINE

## 2023-08-28 RX ORDER — MELOXICAM 15 MG/1
15 TABLET ORAL DAILY
Qty: 14 TABLET | Refills: 0 | Status: SHIPPED | OUTPATIENT
Start: 2023-08-28 | End: 2023-09-11

## 2023-08-28 RX ORDER — KETOROLAC TROMETHAMINE 10 MG/1
10 TABLET, FILM COATED ORAL
Status: COMPLETED | OUTPATIENT
Start: 2023-08-28 | End: 2023-08-28

## 2023-08-28 RX ADMIN — KETOROLAC TROMETHAMINE 10 MG: 10 TABLET, FILM COATED ORAL at 08:08
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
## 2023-10-30 ENCOUNTER — TELEPHONE (OUTPATIENT)
Dept: INTERNAL MEDICINE | Facility: CLINIC | Age: 63
End: 2023-10-30
Payer: MEDICAID

## 2023-10-30 NOTE — TELEPHONE ENCOUNTER
Call and spoke with patient wife who states patient states not feeling well. Complain of sob and tingling in feet that has been going on for a few weeks. Patient was offered an appointment for today, declined stating patient was out running errands and and could not come in today. Patient has been schedule for tomorrow morning.

## 2023-10-31 ENCOUNTER — LAB VISIT (OUTPATIENT)
Dept: LAB | Facility: HOSPITAL | Age: 63
End: 2023-10-31
Attending: NURSE PRACTITIONER
Payer: MEDICAID

## 2023-10-31 ENCOUNTER — OFFICE VISIT (OUTPATIENT)
Dept: INTERNAL MEDICINE | Facility: CLINIC | Age: 63
End: 2023-10-31
Payer: MEDICAID

## 2023-10-31 VITALS
DIASTOLIC BLOOD PRESSURE: 92 MMHG | OXYGEN SATURATION: 96 % | HEART RATE: 81 BPM | TEMPERATURE: 98 F | RESPIRATION RATE: 18 BRPM | BODY MASS INDEX: 27.29 KG/M2 | WEIGHT: 205.94 LBS | SYSTOLIC BLOOD PRESSURE: 150 MMHG | HEIGHT: 73 IN

## 2023-10-31 DIAGNOSIS — F41.9 ANXIETY: ICD-10-CM

## 2023-10-31 DIAGNOSIS — F10.29 ALCOHOL DEPENDENCE WITH UNSPECIFIED ALCOHOL-INDUCED DISORDER: ICD-10-CM

## 2023-10-31 DIAGNOSIS — I10 ESSENTIAL HYPERTENSION: Primary | ICD-10-CM

## 2023-10-31 DIAGNOSIS — Z23 NEED FOR TETANUS BOOSTER: ICD-10-CM

## 2023-10-31 LAB
ALBUMIN SERPL BCP-MCNC: 3.8 G/DL (ref 3.5–5.2)
ALP SERPL-CCNC: 76 U/L (ref 55–135)
ALT SERPL W/O P-5'-P-CCNC: 56 U/L (ref 10–44)
ANION GAP SERPL CALC-SCNC: 11 MMOL/L (ref 8–16)
AST SERPL-CCNC: 67 U/L (ref 10–40)
BASOPHILS # BLD AUTO: 0.08 K/UL (ref 0–0.2)
BASOPHILS NFR BLD: 1 % (ref 0–1.9)
BILIRUB SERPL-MCNC: 0.8 MG/DL (ref 0.1–1)
BUN SERPL-MCNC: 14 MG/DL (ref 8–23)
CALCIUM SERPL-MCNC: 8.8 MG/DL (ref 8.7–10.5)
CHLORIDE SERPL-SCNC: 108 MMOL/L (ref 95–110)
CO2 SERPL-SCNC: 24 MMOL/L (ref 23–29)
CREAT SERPL-MCNC: 1 MG/DL (ref 0.5–1.4)
DIFFERENTIAL METHOD: ABNORMAL
EOSINOPHIL # BLD AUTO: 0.3 K/UL (ref 0–0.5)
EOSINOPHIL NFR BLD: 3.3 % (ref 0–8)
ERYTHROCYTE [DISTWIDTH] IN BLOOD BY AUTOMATED COUNT: 11.9 % (ref 11.5–14.5)
EST. GFR  (NO RACE VARIABLE): >60 ML/MIN/1.73 M^2
GLUCOSE SERPL-MCNC: 100 MG/DL (ref 70–110)
HCT VFR BLD AUTO: 40.2 % (ref 40–54)
HGB BLD-MCNC: 14.2 G/DL (ref 14–18)
IMM GRANULOCYTES # BLD AUTO: 0.03 K/UL (ref 0–0.04)
IMM GRANULOCYTES NFR BLD AUTO: 0.4 % (ref 0–0.5)
LYMPHOCYTES # BLD AUTO: 2.1 K/UL (ref 1–4.8)
LYMPHOCYTES NFR BLD: 26.1 % (ref 18–48)
MAGNESIUM SERPL-MCNC: 1.8 MG/DL (ref 1.6–2.6)
MCH RBC QN AUTO: 33.6 PG (ref 27–31)
MCHC RBC AUTO-ENTMCNC: 35.3 G/DL (ref 32–36)
MCV RBC AUTO: 95 FL (ref 82–98)
MONOCYTES # BLD AUTO: 0.7 K/UL (ref 0.3–1)
MONOCYTES NFR BLD: 8.6 % (ref 4–15)
NEUTROPHILS # BLD AUTO: 4.8 K/UL (ref 1.8–7.7)
NEUTROPHILS NFR BLD: 60.6 % (ref 38–73)
NRBC BLD-RTO: 0 /100 WBC
PHOSPHATE SERPL-MCNC: 2.9 MG/DL (ref 2.7–4.5)
PLATELET # BLD AUTO: 234 K/UL (ref 150–450)
PMV BLD AUTO: 9.8 FL (ref 9.2–12.9)
POTASSIUM SERPL-SCNC: 4.2 MMOL/L (ref 3.5–5.1)
PROT SERPL-MCNC: 6.9 G/DL (ref 6–8.4)
RBC # BLD AUTO: 4.22 M/UL (ref 4.6–6.2)
SODIUM SERPL-SCNC: 143 MMOL/L (ref 136–145)
WBC # BLD AUTO: 7.92 K/UL (ref 3.9–12.7)

## 2023-10-31 PROCEDURE — 4010F ACE/ARB THERAPY RXD/TAKEN: CPT | Mod: CPTII,,, | Performed by: NURSE PRACTITIONER

## 2023-10-31 PROCEDURE — 99999 PR PBB SHADOW E&M-EST. PATIENT-LVL III: CPT | Mod: PBBFAC,,, | Performed by: NURSE PRACTITIONER

## 2023-10-31 PROCEDURE — 99999PBSHW FLU VACCINE (QUAD) GREATER THAN OR EQUAL TO 3YO PRESERVATIVE FREE IM: Mod: PBBFAC,,,

## 2023-10-31 PROCEDURE — 85025 COMPLETE CBC W/AUTO DIFF WBC: CPT | Mod: PO | Performed by: NURSE PRACTITIONER

## 2023-10-31 PROCEDURE — 99999PBSHW TDAP VACCINE GREATER THAN OR EQUAL TO 7YO IM: Mod: PBBFAC,,,

## 2023-10-31 PROCEDURE — 3080F PR MOST RECENT DIASTOLIC BLOOD PRESSURE >= 90 MM HG: ICD-10-PCS | Mod: CPTII,,, | Performed by: NURSE PRACTITIONER

## 2023-10-31 PROCEDURE — 80053 COMPREHEN METABOLIC PANEL: CPT | Mod: PO | Performed by: NURSE PRACTITIONER

## 2023-10-31 PROCEDURE — 36415 COLL VENOUS BLD VENIPUNCTURE: CPT | Mod: PO | Performed by: NURSE PRACTITIONER

## 2023-10-31 PROCEDURE — 3077F PR MOST RECENT SYSTOLIC BLOOD PRESSURE >= 140 MM HG: ICD-10-PCS | Mod: CPTII,,, | Performed by: NURSE PRACTITIONER

## 2023-10-31 PROCEDURE — 1159F MED LIST DOCD IN RCRD: CPT | Mod: CPTII,,, | Performed by: NURSE PRACTITIONER

## 2023-10-31 PROCEDURE — 99999 PR PBB SHADOW E&M-EST. PATIENT-LVL III: ICD-10-PCS | Mod: PBBFAC,,, | Performed by: NURSE PRACTITIONER

## 2023-10-31 PROCEDURE — 1159F PR MEDICATION LIST DOCUMENTED IN MEDICAL RECORD: ICD-10-PCS | Mod: CPTII,,, | Performed by: NURSE PRACTITIONER

## 2023-10-31 PROCEDURE — 4010F PR ACE/ARB THEARPY RXD/TAKEN: ICD-10-PCS | Mod: CPTII,,, | Performed by: NURSE PRACTITIONER

## 2023-10-31 PROCEDURE — 99999PBSHW FLU VACCINE (QUAD) GREATER THAN OR EQUAL TO 3YO PRESERVATIVE FREE IM: ICD-10-PCS | Mod: PBBFAC,,,

## 2023-10-31 PROCEDURE — 3077F SYST BP >= 140 MM HG: CPT | Mod: CPTII,,, | Performed by: NURSE PRACTITIONER

## 2023-10-31 PROCEDURE — 83735 ASSAY OF MAGNESIUM: CPT | Mod: PO | Performed by: NURSE PRACTITIONER

## 2023-10-31 PROCEDURE — 3008F PR BODY MASS INDEX (BMI) DOCUMENTED: ICD-10-PCS | Mod: CPTII,,, | Performed by: NURSE PRACTITIONER

## 2023-10-31 PROCEDURE — 3080F DIAST BP >= 90 MM HG: CPT | Mod: CPTII,,, | Performed by: NURSE PRACTITIONER

## 2023-10-31 PROCEDURE — 84100 ASSAY OF PHOSPHORUS: CPT | Mod: PO | Performed by: NURSE PRACTITIONER

## 2023-10-31 PROCEDURE — 90471 IMMUNIZATION ADMIN: CPT | Mod: PBBFAC,PO

## 2023-10-31 PROCEDURE — 90472 IMMUNIZATION ADMIN EACH ADD: CPT | Mod: PBBFAC,PO

## 2023-10-31 PROCEDURE — 90715 TDAP VACCINE 7 YRS/> IM: CPT | Mod: PBBFAC,PO

## 2023-10-31 PROCEDURE — 99214 PR OFFICE/OUTPT VISIT, EST, LEVL IV, 30-39 MIN: ICD-10-PCS | Mod: S$PBB,,, | Performed by: NURSE PRACTITIONER

## 2023-10-31 PROCEDURE — 99214 OFFICE O/P EST MOD 30 MIN: CPT | Mod: S$PBB,,, | Performed by: NURSE PRACTITIONER

## 2023-10-31 PROCEDURE — 99213 OFFICE O/P EST LOW 20 MIN: CPT | Mod: PBBFAC,PO | Performed by: NURSE PRACTITIONER

## 2023-10-31 PROCEDURE — 3008F BODY MASS INDEX DOCD: CPT | Mod: CPTII,,, | Performed by: NURSE PRACTITIONER

## 2023-10-31 NOTE — PROGRESS NOTES
"Subjective:       Patient ID: Aguilar Rowe is a 63 y.o. male.    Chief Complaint: Headache (Patient states he is trying to stop drinking and has been struggling)    Mr. Rowe presents to visit for advice on ETOH withdrawing/detox. Recent admitted to [facility] for one week with alcohol withdrawal monitoring. Relapsed 8 days after discharge. Wondering if medication can be prescribed for outpatient treatment instead of having to go back inpatient. Admits to drinking 12 pk of beer and 2 pints of whiskey yesterday.     CIWA-Ar score >20. Complaints of "brain fog", cognitive impairment, moderate headaches, persistent tremors, numbness and tingling to lower ext, significant agitation and anxiety, sweats, and N/V. Denies any auditory or visual hallucinations. Also feels that he is having some shortness of breath with activity, but feels it is due to anxiety with withdrawal.         Patient Active Problem List   Diagnosis    Anxiety    Essential hypertension    Alcohol dependence    Right carpal tunnel syndrome    Pain of right hand    Hiatal hernia    Decreased range of motion    Decreased muscle strength    Abnormal nuclear stress test    Closed fracture of orbit    Hypercholesterolemia    Closed fracture of right wrist    Skin lesion of scalp       Family History   Problem Relation Age of Onset    No Known Problems Mother     Lung cancer Father     No Known Problems Sister      Past Surgical History:   Procedure Laterality Date    APPENDECTOMY      CARPAL TUNNEL RELEASE Right 9/9/2021    Procedure: RELEASE, CARPAL TUNNEL;  Surgeon: Nilton Dubose MD;  Location: [facility] OR;  Service: Orthopedics;  Laterality: Right;  right carpal tunnel release and right long and ring trigger finger release    CARPECTOMY Right 11/14/2022    Procedure: CARPECTOMY;  Surgeon: Nilton Dubose MD;  Location: [facility] OR;  Service: Orthopedics;  Laterality: Right;  proximal row carpectomy    COLONOSCOPY N/A 3/9/2023    " Procedure: COLONOSCOPY;  Surgeon: Hailey Johnson MD;  Location: [facility] ENDO;  Service: Endoscopy;  Laterality: N/A;    FINGER SURGERY Left     SHOULDER SURGERY Right     TRIGGER FINGER RELEASE Right 9/9/2021    Procedure: RELEASE, TRIGGER FINGER;  Surgeon: Nilton Dubose MD;  Location: [facility] OR;  Service: Orthopedics;  Laterality: Right;  right carpal tunnel release and right long and ring trigger finger release         Current Outpatient Medications:     acetaminophen (TYLENOL 8 HOUR ORAL), Take by mouth every 8 (eight) hours as needed., Disp: , Rfl:     aspirin (ECOTRIN) 81 MG EC tablet, Take 81 mg by mouth once daily., Disp: , Rfl:     atorvastatin (LIPITOR) 20 MG tablet, Take 20 mg by mouth once daily., Disp: , Rfl:     FLUoxetine 10 MG capsule, TAKE 1 CAPSULE(10 MG) BY MOUTH EVERY DAY, Disp: 30 capsule, Rfl: 11    hydrOXYzine pamoate (VISTARIL) 25 MG Cap, TAKE 1 CAPSULE(25 MG) BY MOUTH EVERY 6 HOURS AS NEEDED FOR ANXIETY, Disp: 30 capsule, Rfl: 5    lisinopriL 10 MG tablet, TAKE 1 TABLET(10 MG) BY MOUTH EVERY DAY, Disp: 90 tablet, Rfl: 1    Review of Systems   Constitutional:  Positive for fatigue. Negative for appetite change, chills, diaphoresis and fever.   Respiratory:  Positive for shortness of breath. Negative for cough, chest tightness and wheezing.    Cardiovascular:  Negative for chest pain and palpitations.   Gastrointestinal:  Positive for nausea and vomiting. Negative for abdominal pain, blood in stool, constipation and diarrhea.   Neurological:  Positive for tremors, light-headedness, numbness (and tingling, bilateral lower ext) and headaches. Negative for dizziness, seizures, syncope, speech difficulty and weakness.   Psychiatric/Behavioral:  Positive for agitation and decreased concentration. Negative for hallucinations. The patient is nervous/anxious.        Objective:   BP (!) 150/92 (BP Location: Left arm, Patient Position: Sitting, BP Method: Large (Manual))   Pulse 81   Temp 98.1  "°F (36.7 °C) (Temporal)   Resp 18   Ht 6' 1" (1.854 m)   Wt 93.4 kg (205 lb 14.6 oz)   SpO2 96%   BMI 27.17 kg/m²      Physical Exam  Constitutional:       General: He is not in acute distress.     Appearance: Normal appearance. He is not ill-appearing.   HENT:      Head: Normocephalic.   Cardiovascular:      Rate and Rhythm: Normal rate and regular rhythm.      Pulses: Normal pulses.      Heart sounds: Normal heart sounds. No murmur heard.     No friction rub. No gallop.   Pulmonary:      Effort: Pulmonary effort is normal. No respiratory distress.      Breath sounds: Normal breath sounds. No wheezing.   Musculoskeletal:      Right lower leg: No edema.      Left lower leg: No edema.   Skin:     General: Skin is warm and dry.      Coloration: Skin is not pale.      Findings: No erythema.   Neurological:      Mental Status: He is alert and oriented to person, place, and time.      GCS: GCS eye subscore is 4. GCS verbal subscore is 5. GCS motor subscore is 6.      Cranial Nerves: No dysarthria or facial asymmetry.      Motor: Tremor present.      Gait: Gait is intact.   Psychiatric:         Mood and Affect: Mood is anxious. Mood is not depressed. Affect is not tearful.         Speech: Speech normal.         Behavior: Behavior is cooperative.         Assessment & Plan     Problem List Items Addressed This Visit          Psychiatric    Anxiety    Current Assessment & Plan     On fluoxetine. Recent increase in anxiety due to w/d from ETOH. Could possibly increase, but will not help with detox/withdrawal symptoms during current acute episode.          Alcohol dependence    Current Assessment & Plan     Due to risk of severe withdrawal symptoms, recommend going back to [facility] or equivalent facility for monitored detox. Do not feel comfortable with outpatient management due to previous symptoms with withdrawal.          Relevant Orders    CBC Auto Differential (Completed)    COMPREHENSIVE METABOLIC PANEL (Completed) " "   Magnesium (Completed)    PHOSPHORUS (Completed)       Cardiac/Vascular    Essential hypertension - Primary    Current Assessment & Plan     Blood pressure elevated today. Likely due to current withdrawal. Follow up in two weeks for nurse visit.           Other Visit Diagnoses       Need for tetanus booster        Relevant Orders    Tdap Vaccine (Completed)          Follow up in about 2 weeks (around 11/14/2023) for hypertension nurse visit .            Portions of this note may have been created with voice recognition software. Occasional "wrong-word" or "sound-a-like" substitutions may have occurred due to the inherent limitations of voice recognition software. Please, read the note carefully and recognize, using context, where substitutions have occurred.       "

## 2023-10-31 NOTE — ASSESSMENT & PLAN NOTE
Blood pressure elevated today. Likely due to current withdrawal. Follow up in two weeks for nurse visit.

## 2023-10-31 NOTE — ASSESSMENT & PLAN NOTE
Due to risk of severe withdrawal symptoms, recommend going back to [facility] or equivalent facility for monitored detox. Do not feel comfortable with outpatient management due to previous symptoms with withdrawal.

## 2023-10-31 NOTE — ASSESSMENT & PLAN NOTE
On fluoxetine. Recent increase in anxiety due to w/d from ETOH. Could possibly increase, but will not help with detox/withdrawal symptoms during current acute episode.

## 2023-11-27 ENCOUNTER — TELEPHONE (OUTPATIENT)
Dept: INTERNAL MEDICINE | Facility: CLINIC | Age: 63
End: 2023-11-27
Payer: MEDICAID

## 2023-11-27 DIAGNOSIS — I10 ESSENTIAL HYPERTENSION: ICD-10-CM

## 2023-11-27 RX ORDER — GUANFACINE 1 MG/1
1 TABLET ORAL NIGHTLY
Qty: 30 TABLET | Refills: 5 | Status: SHIPPED | OUTPATIENT
Start: 2023-11-27

## 2023-11-27 RX ORDER — FLUOXETINE HYDROCHLORIDE 40 MG/1
40 CAPSULE ORAL
COMMUNITY
Start: 2023-11-05 | End: 2023-11-27 | Stop reason: SDUPTHER

## 2023-11-27 RX ORDER — ATORVASTATIN CALCIUM 20 MG/1
20 TABLET, FILM COATED ORAL DAILY
Qty: 90 TABLET | Refills: 3 | Status: SHIPPED | OUTPATIENT
Start: 2023-11-27

## 2023-11-27 RX ORDER — TRAZODONE HYDROCHLORIDE 50 MG/1
50 TABLET ORAL NIGHTLY
Qty: 90 TABLET | Refills: 3 | Status: SHIPPED | OUTPATIENT
Start: 2023-11-27

## 2023-11-27 RX ORDER — LISINOPRIL 10 MG/1
10 TABLET ORAL DAILY
Qty: 90 TABLET | Refills: 3 | Status: SHIPPED | OUTPATIENT
Start: 2023-11-27

## 2023-11-27 RX ORDER — GUANFACINE 1 MG/1
1 TABLET ORAL NIGHTLY
COMMUNITY
End: 2023-11-27 | Stop reason: SDUPTHER

## 2023-11-27 RX ORDER — TRAZODONE HYDROCHLORIDE 50 MG/1
50 TABLET ORAL NIGHTLY
COMMUNITY
Start: 2023-11-05 | End: 2023-11-27 | Stop reason: SDUPTHER

## 2023-11-27 RX ORDER — FLUOXETINE HYDROCHLORIDE 40 MG/1
40 CAPSULE ORAL DAILY
Qty: 90 CAPSULE | Refills: 1 | Status: SHIPPED | OUTPATIENT
Start: 2023-11-27

## 2023-11-27 NOTE — TELEPHONE ENCOUNTER
----- Message from Roxy Wray sent at 11/27/2023  7:47 AM CST -----  Contact: self  ..Type:  Same Day Appointment Request    Caller is requesting a same day appointment.  Caller declined first available appointment listed below.    Name of Caller:Reddy Rowe  When is the first available appointment?   Symptoms:shoulder pain , medication refill   Best Call Back Number:.[phone]  Additional Information: MRN: [id number] [place]

## 2023-12-04 ENCOUNTER — TELEPHONE (OUTPATIENT)
Dept: INTERNAL MEDICINE | Facility: CLINIC | Age: 63
End: 2023-12-04
Payer: MEDICAID

## 2023-12-04 NOTE — TELEPHONE ENCOUNTER
----- Message from Vicky Paredes sent at 12/4/2023  8:01 AM CST -----  Regarding: appointment  Name of Who is Calling: Meghann, SO         What is the request in detail: Meghann is requesting a call back in regards to patient having shoulder pain and fell twice and pain is intense.            Can the clinic reply by [facility]: No           What Number to Call Back if not in [facility]: [phone]

## 2023-12-06 ENCOUNTER — OFFICE VISIT (OUTPATIENT)
Dept: INTERNAL MEDICINE | Facility: CLINIC | Age: 63
End: 2023-12-06
Payer: MEDICAID

## 2023-12-06 ENCOUNTER — HOSPITAL ENCOUNTER (OUTPATIENT)
Dept: RADIOLOGY | Facility: HOSPITAL | Age: 63
Discharge: HOME | End: 2023-12-06
Attending: NURSE PRACTITIONER
Payer: MEDICAID

## 2023-12-06 VITALS
HEIGHT: 73 IN | TEMPERATURE: 98 F | WEIGHT: 205.94 LBS | DIASTOLIC BLOOD PRESSURE: 80 MMHG | HEART RATE: 88 BPM | RESPIRATION RATE: 18 BRPM | OXYGEN SATURATION: 98 % | SYSTOLIC BLOOD PRESSURE: 122 MMHG | BODY MASS INDEX: 27.29 KG/M2

## 2023-12-06 DIAGNOSIS — M25.512 CHRONIC LEFT SHOULDER PAIN: Primary | ICD-10-CM

## 2023-12-06 DIAGNOSIS — F41.9 ANXIETY: ICD-10-CM

## 2023-12-06 DIAGNOSIS — I10 ESSENTIAL HYPERTENSION: ICD-10-CM

## 2023-12-06 DIAGNOSIS — M25.512 CHRONIC LEFT SHOULDER PAIN: ICD-10-CM

## 2023-12-06 DIAGNOSIS — G89.29 CHRONIC LEFT SHOULDER PAIN: Primary | ICD-10-CM

## 2023-12-06 DIAGNOSIS — G89.29 CHRONIC LEFT SHOULDER PAIN: ICD-10-CM

## 2023-12-06 PROCEDURE — 3074F PR MOST RECENT SYSTOLIC BLOOD PRESSURE < 130 MM HG: ICD-10-PCS | Mod: CPTII,,, | Performed by: NURSE PRACTITIONER

## 2023-12-06 PROCEDURE — 99215 OFFICE O/P EST HI 40 MIN: CPT | Mod: PBBFAC,PO | Performed by: NURSE PRACTITIONER

## 2023-12-06 PROCEDURE — 3079F PR MOST RECENT DIASTOLIC BLOOD PRESSURE 80-89 MM HG: ICD-10-PCS | Mod: CPTII,,, | Performed by: NURSE PRACTITIONER

## 2023-12-06 PROCEDURE — 73030 XR SHOULDER COMPLETE 2 OR MORE VIEWS LEFT: ICD-10-PCS | Mod: 26,LT,, | Performed by: RADIOLOGY

## 2023-12-06 PROCEDURE — 1160F PR REVIEW ALL MEDS BY PRESCRIBER/CLIN PHARMACIST DOCUMENTED: ICD-10-PCS | Mod: CPTII,,, | Performed by: NURSE PRACTITIONER

## 2023-12-06 PROCEDURE — 99999 PR PBB SHADOW E&M-EST. PATIENT-LVL V: ICD-10-PCS | Mod: PBBFAC,,, | Performed by: NURSE PRACTITIONER

## 2023-12-06 PROCEDURE — 3008F PR BODY MASS INDEX (BMI) DOCUMENTED: ICD-10-PCS | Mod: CPTII,,, | Performed by: NURSE PRACTITIONER

## 2023-12-06 PROCEDURE — 3008F BODY MASS INDEX DOCD: CPT | Mod: CPTII,,, | Performed by: NURSE PRACTITIONER

## 2023-12-06 PROCEDURE — 1160F RVW MEDS BY RX/DR IN RCRD: CPT | Mod: CPTII,,, | Performed by: NURSE PRACTITIONER

## 2023-12-06 PROCEDURE — 3079F DIAST BP 80-89 MM HG: CPT | Mod: CPTII,,, | Performed by: NURSE PRACTITIONER

## 2023-12-06 PROCEDURE — 3074F SYST BP LT 130 MM HG: CPT | Mod: CPTII,,, | Performed by: NURSE PRACTITIONER

## 2023-12-06 PROCEDURE — 99999 PR PBB SHADOW E&M-EST. PATIENT-LVL V: CPT | Mod: PBBFAC,,, | Performed by: NURSE PRACTITIONER

## 2023-12-06 PROCEDURE — 73030 X-RAY EXAM OF SHOULDER: CPT | Mod: TC,PO,LT

## 2023-12-06 PROCEDURE — 4010F ACE/ARB THERAPY RXD/TAKEN: CPT | Mod: CPTII,,, | Performed by: NURSE PRACTITIONER

## 2023-12-06 PROCEDURE — 99214 OFFICE O/P EST MOD 30 MIN: CPT | Mod: S$PBB,,, | Performed by: NURSE PRACTITIONER

## 2023-12-06 PROCEDURE — 1159F PR MEDICATION LIST DOCUMENTED IN MEDICAL RECORD: ICD-10-PCS | Mod: CPTII,,, | Performed by: NURSE PRACTITIONER

## 2023-12-06 PROCEDURE — 1159F MED LIST DOCD IN RCRD: CPT | Mod: CPTII,,, | Performed by: NURSE PRACTITIONER

## 2023-12-06 PROCEDURE — 73030 X-RAY EXAM OF SHOULDER: CPT | Mod: 26,LT,, | Performed by: RADIOLOGY

## 2023-12-06 PROCEDURE — 4010F PR ACE/ARB THEARPY RXD/TAKEN: ICD-10-PCS | Mod: CPTII,,, | Performed by: NURSE PRACTITIONER

## 2023-12-06 PROCEDURE — 99214 PR OFFICE/OUTPT VISIT, EST, LEVL IV, 30-39 MIN: ICD-10-PCS | Mod: S$PBB,,, | Performed by: NURSE PRACTITIONER

## 2023-12-06 NOTE — PROGRESS NOTES
Subjective:       Patient ID: Aguilar Rowe is a 63 y.o. male.    Chief Complaint: Shoulder Pain    Mr. Rowe presents to visit with complaint of L shoulder pain, onset approx 5 months. Has difficulty with lifting up. No relief with tylenol. Did have fall a week ago that has caused pain to worsen.     Shoulder Pain   The pain is present in the left shoulder. This is a chronic problem. The current episode started more than 1 month ago. The problem occurs constantly. The problem has been gradually worsening. The quality of the pain is described as aching. The pain is at a severity of 8/10. The pain is moderate. Associated symptoms include a limited range of motion. Pertinent negatives include no fever, headaches, inability to bear weight, itching, joint locking, joint swelling, numbness, stiffness or tingling. The symptoms are aggravated by activity. He has tried acetaminophen for the symptoms.       Patient Active Problem List   Diagnosis    Anxiety    Essential hypertension    Alcohol dependence    Right carpal tunnel syndrome    Pain of right hand    Hiatal hernia    Decreased range of motion    Decreased muscle strength    Abnormal nuclear stress test    Closed fracture of orbit    Hypercholesterolemia    Closed fracture of right wrist    Skin lesion of scalp       Family History   Problem Relation Age of Onset    No Known Problems Mother     Lung cancer Father     No Known Problems Sister      Past Surgical History:   Procedure Laterality Date    APPENDECTOMY      CARPAL TUNNEL RELEASE Right 9/9/2021    Procedure: RELEASE, CARPAL TUNNEL;  Surgeon: Nilton Dubose MD;  Location: [facility] OR;  Service: Orthopedics;  Laterality: Right;  right carpal tunnel release and right long and ring trigger finger release    CARPECTOMY Right 11/14/2022    Procedure: CARPECTOMY;  Surgeon: Nilton Dubose MD;  Location: [facility] OR;  Service: Orthopedics;  Laterality: Right;  proximal row carpectomy    COLONOSCOPY N/A  3/9/2023    Procedure: COLONOSCOPY;  Surgeon: Hailey Johnson MD;  Location: [facility] ENDO;  Service: Endoscopy;  Laterality: N/A;    FINGER SURGERY Left     SHOULDER SURGERY Right     TRIGGER FINGER RELEASE Right 9/9/2021    Procedure: RELEASE, TRIGGER FINGER;  Surgeon: Nilton Dubose MD;  Location: [facility] OR;  Service: Orthopedics;  Laterality: Right;  right carpal tunnel release and right long and ring trigger finger release         Current Outpatient Medications:     acetaminophen (TYLENOL 8 HOUR ORAL), Take by mouth every 8 (eight) hours as needed., Disp: , Rfl:     aspirin (ECOTRIN) 81 MG EC tablet, Take 81 mg by mouth once daily., Disp: , Rfl:     atorvastatin (LIPITOR) 20 MG tablet, Take 1 tablet (20 mg total) by mouth once daily., Disp: 90 tablet, Rfl: 3    FLUoxetine 40 MG capsule, Take 1 capsule (40 mg total) by mouth once daily., Disp: 90 capsule, Rfl: 1    guanFACINE (TENEX) 1 MG Tab, Take 1 tablet (1 mg total) by mouth every evening. (Patient taking differently: Take 1 mg by mouth every evening. guanfa), Disp: 30 tablet, Rfl: 5    lisinopriL 10 MG tablet, Take 1 tablet (10 mg total) by mouth once daily., Disp: 90 tablet, Rfl: 3    traZODone (DESYREL) 50 MG tablet, Take 1 tablet (50 mg total) by mouth every evening., Disp: 90 tablet, Rfl: 3    hydrOXYzine pamoate (VISTARIL) 25 MG Cap, TAKE 1 CAPSULE(25 MG) BY MOUTH EVERY 6 HOURS AS NEEDED FOR ANXIETY (Patient not taking: Reported on 11/27/2023), Disp: 30 capsule, Rfl: 5    Review of Systems   Constitutional:  Negative for appetite change, chills, fatigue and fever.   Respiratory:  Negative for cough and shortness of breath.    Cardiovascular:  Negative for chest pain and palpitations.   Musculoskeletal:  Positive for arthralgias. Negative for gait problem and stiffness.   Skin:  Negative for itching.   Neurological:  Negative for dizziness, tingling, light-headedness, numbness and headaches.       Objective:   /80 (BP Location: Left forearm,  "Patient Position: Sitting, BP Method: X-Large (Manual))   Pulse 88   Temp 98.1 °F (36.7 °C) (Temporal)   Resp 18   Ht 6' 1" (1.854 m)   Wt 93.4 kg (205 lb 14.6 oz)   SpO2 98%   BMI 27.17 kg/m²      Physical Exam  Constitutional:       General: He is not in acute distress.     Appearance: Normal appearance. He is not ill-appearing.   Cardiovascular:      Rate and Rhythm: Normal rate and regular rhythm.      Pulses: Normal pulses.      Heart sounds: Normal heart sounds. No murmur heard.     No friction rub. No gallop.   Pulmonary:      Effort: Pulmonary effort is normal. No respiratory distress.      Breath sounds: Normal breath sounds. No wheezing.   Musculoskeletal:      Left shoulder: Tenderness present. No bony tenderness. Decreased range of motion. Decreased strength.   Skin:     General: Skin is warm and dry.      Coloration: Skin is not pale.      Findings: No erythema.   Neurological:      Mental Status: He is alert and oriented to person, place, and time.         Assessment & Plan     Problem List Items Addressed This Visit          Psychiatric    Anxiety    Current Assessment & Plan     Has not been able to get 40 mg filled at pharmacy so has been taking 2-20 mg of previous home supply. Will run out soon. Will contact [facility] to see what the reason for not being able to fill rx.             Cardiac/Vascular    Essential hypertension    Current Assessment & Plan     Blood pressure stable on current medications.          Other Visit Diagnoses       Chronic left shoulder pain    -  Primary    Relevant Orders    X-Ray Shoulder 2 or More Views Left    Ambulatory referral/consult to Orthopedics    Ambulatory referral/consult to Physical/Occupational Therapy          Follow up if symptoms worsen or fail to improve.            Portions of this note may have been created with voice recognition software. Occasional "wrong-word" or "sound-a-like" substitutions may have occurred due to the inherent limitations " of voice recognition software. Please, read the note carefully and recognize, using context, where substitutions have occurred.

## 2023-12-06 NOTE — ASSESSMENT & PLAN NOTE
Has not been able to get 40 mg filled at pharmacy so has been taking 2-20 mg of previous home supply. Will run out soon. [facility] to see what the reason for not being able to fill rx.

## 2023-12-07 ENCOUNTER — TELEPHONE (OUTPATIENT)
Dept: INTERNAL MEDICINE | Facility: CLINIC | Age: 63
End: 2023-12-07
Payer: MEDICAID

## 2023-12-07 NOTE — TELEPHONE ENCOUNTER
----- Message from Israel Isbell sent at 12/7/2023  1:03 PM CST -----  Contact: [phone]  Patient is calling in regards to giving nurse the orthopedic  providers information. Please call pt back at [phone]. Thanks KB

## 2023-12-07 NOTE — TELEPHONE ENCOUNTER
Returned call to patient, informed that none of the provider she gave is accepting new Medicaid.  Wife stated patient just received Medicare last  week . Informed her to bring new insurance information to office so can update. With medicare we will be able to schedule with orthopedic sooner

## 2023-12-08 ENCOUNTER — TELEPHONE (OUTPATIENT)
Dept: INTERNAL MEDICINE | Facility: CLINIC | Age: 63
End: 2023-12-08
Payer: MEDICAID

## 2023-12-08 NOTE — TELEPHONE ENCOUNTER
Returned call to patient, who like to wait to schedule appointment once his Medicare is active on 01/01/2024

## 2023-12-08 NOTE — TELEPHONE ENCOUNTER
----- Message from Isi Tabares sent at 12/8/2023  2:02 PM CST -----  Contact: Meghann Zavaleta state that insurance does not start to 01-01-24 for procedure to be done.  Please give him a call back at [phone].

## 2024-01-03 ENCOUNTER — TELEPHONE (OUTPATIENT)
Dept: INTERNAL MEDICINE | Facility: CLINIC | Age: 64
End: 2024-01-03
Payer: MEDICARE

## 2024-01-03 NOTE — TELEPHONE ENCOUNTER
----- Message from Teresa Christiansen sent at 1/3/2024  1:10 PM CST -----  Contact: Aguilar Portillo is calling in regards to getting a call back. Please call back at  [phone]                          Thanks  KT

## 2024-04-24 ENCOUNTER — OFFICE VISIT (OUTPATIENT)
Dept: INTERNAL MEDICINE | Facility: CLINIC | Age: 64
End: 2024-04-24
Payer: MEDICARE

## 2024-04-24 VITALS
WEIGHT: 200.38 LBS | SYSTOLIC BLOOD PRESSURE: 126 MMHG | TEMPERATURE: 99 F | DIASTOLIC BLOOD PRESSURE: 72 MMHG | OXYGEN SATURATION: 95 % | HEIGHT: 73 IN | RESPIRATION RATE: 16 BRPM | HEART RATE: 105 BPM | BODY MASS INDEX: 26.56 KG/M2

## 2024-04-24 DIAGNOSIS — I10 ESSENTIAL HYPERTENSION: ICD-10-CM

## 2024-04-24 DIAGNOSIS — F10.29 ALCOHOL DEPENDENCE WITH UNSPECIFIED ALCOHOL-INDUCED DISORDER: ICD-10-CM

## 2024-04-24 DIAGNOSIS — J06.9 UPPER RESPIRATORY TRACT INFECTION, UNSPECIFIED TYPE: Primary | ICD-10-CM

## 2024-04-24 DIAGNOSIS — R79.89 ELEVATED LFTS: ICD-10-CM

## 2024-04-24 DIAGNOSIS — R05.9 COUGH, UNSPECIFIED TYPE: ICD-10-CM

## 2024-04-24 DIAGNOSIS — F41.9 ANXIETY: ICD-10-CM

## 2024-04-24 LAB
CTP QC/QA: YES
SARS-COV-2 RDRP RESP QL NAA+PROBE: NEGATIVE

## 2024-04-24 PROCEDURE — 87635 SARS-COV-2 COVID-19 AMP PRB: CPT | Mod: PBBFAC,PO | Performed by: NURSE PRACTITIONER

## 2024-04-24 PROCEDURE — 99999 PR PBB SHADOW E&M-EST. PATIENT-LVL III: CPT | Mod: PBBFAC,,, | Performed by: NURSE PRACTITIONER

## 2024-04-24 PROCEDURE — 87635 SARS-COV-2 COVID-19 AMP PRB: CPT | Mod: QW,S$GLB,, | Performed by: NURSE PRACTITIONER

## 2024-04-24 PROCEDURE — 99213 OFFICE O/P EST LOW 20 MIN: CPT | Mod: PBBFAC,PO | Performed by: NURSE PRACTITIONER

## 2024-04-24 PROCEDURE — 99214 OFFICE O/P EST MOD 30 MIN: CPT | Mod: S$PBB,,, | Performed by: NURSE PRACTITIONER

## 2024-04-24 RX ORDER — FLUTICASONE PROPIONATE 50 MCG
2 SPRAY, SUSPENSION (ML) NASAL DAILY
Qty: 16 G | Refills: 0 | Status: SHIPPED | OUTPATIENT
Start: 2024-04-24

## 2024-04-24 RX ORDER — CETIRIZINE HYDROCHLORIDE 10 MG/1
10 TABLET ORAL DAILY
Qty: 30 TABLET | Refills: 0 | Status: SHIPPED | OUTPATIENT
Start: 2024-04-24

## 2024-04-24 NOTE — PROGRESS NOTES
Subjective:       Patient ID: Aguilar Rowe is a 64 y.o. male.    Chief Complaint: Cough    Mr. Rowe presents to clinic for cough and headache. Onset 4 days ago. Reports headaches and cough. Denies chills, fever, sore throat, shortness of breath, or chest pain. Has tried benadryl, motrin, and nyquil with mild relief. Feels better today than he has for the past few days. He was also able to get a decent night of rest last night.     Cough  This is a new problem. The current episode started in the past 7 days. The problem has been gradually improving. The cough is Productive of sputum. Associated symptoms include headaches, postnasal drip and rhinorrhea. Pertinent negatives include no chest pain, chills, ear congestion, ear pain, fever, myalgias, nasal congestion, sore throat, shortness of breath or wheezing. Nothing aggravates the symptoms.       Patient Active Problem List   Diagnosis    Anxiety    Essential hypertension    Alcohol dependence    Right carpal tunnel syndrome    Pain of right hand    Hiatal hernia    Decreased range of motion    Decreased muscle strength    Abnormal nuclear stress test    Closed fracture of orbit    Hypercholesterolemia    Closed fracture of right wrist    Skin lesion of scalp       Family History   Problem Relation Name Age of Onset    No Known Problems Mother      Lung cancer Father      No Known Problems Sister       Past Surgical History:   Procedure Laterality Date    APPENDECTOMY      CARPAL TUNNEL RELEASE Right 9/9/2021    Procedure: RELEASE, CARPAL TUNNEL;  Surgeon: Nilton Dubose MD;  Location: [facility] OR;  Service: Orthopedics;  Laterality: Right;  right carpal tunnel release and right long and ring trigger finger release    CARPECTOMY Right 11/14/2022    Procedure: CARPECTOMY;  Surgeon: Nilton Dubose MD;  Location: [facility] OR;  Service: Orthopedics;  Laterality: Right;  proximal row carpectomy    COLONOSCOPY N/A 3/9/2023    Procedure: COLONOSCOPY;  Surgeon:  Hailey Johnson MD;  Location: [facility] ENDO;  Service: Endoscopy;  Laterality: N/A;    FINGER SURGERY Left     SHOULDER SURGERY Right     TRIGGER FINGER RELEASE Right 9/9/2021    Procedure: RELEASE, TRIGGER FINGER;  Surgeon: Nilton Dubose MD;  Location: [facility] OR;  Service: Orthopedics;  Laterality: Right;  right carpal tunnel release and right long and ring trigger finger release         Current Outpatient Medications:     acetaminophen (TYLENOL 8 HOUR ORAL), Take by mouth every 8 (eight) hours as needed., Disp: , Rfl:     aspirin (ECOTRIN) 81 MG EC tablet, Take 81 mg by mouth once daily., Disp: , Rfl:     atorvastatin (LIPITOR) 20 MG tablet, Take 1 tablet (20 mg total) by mouth once daily., Disp: 90 tablet, Rfl: 3    FLUoxetine 40 MG capsule, Take 1 capsule (40 mg total) by mouth once daily., Disp: 90 capsule, Rfl: 1    guanFACINE (TENEX) 1 MG Tab, Take 1 tablet (1 mg total) by mouth every evening. (Patient taking differently: Take 1 mg by mouth every evening. guanfa), Disp: 30 tablet, Rfl: 5    hydrOXYzine pamoate (VISTARIL) 25 MG Cap, TAKE 1 CAPSULE(25 MG) BY MOUTH EVERY 6 HOURS AS NEEDED FOR ANXIETY, Disp: 30 capsule, Rfl: 5    lisinopriL 10 MG tablet, Take 1 tablet (10 mg total) by mouth once daily., Disp: 90 tablet, Rfl: 3    traZODone (DESYREL) 50 MG tablet, Take 1 tablet (50 mg total) by mouth every evening., Disp: 90 tablet, Rfl: 3    cetirizine (ZYRTEC) 10 MG tablet, Take 1 tablet (10 mg total) by mouth once daily., Disp: 30 tablet, Rfl: 0    fluticasone propionate (FLONASE) 50 mcg/actuation nasal spray, 2 sprays (100 mcg total) by Each Nostril route once daily., Disp: 16 g, Rfl: 0    Review of Systems   Constitutional:  Negative for chills and fever.   HENT:  Positive for postnasal drip and rhinorrhea. Negative for ear pain and sore throat.    Respiratory:  Positive for cough. Negative for shortness of breath and wheezing.    Cardiovascular:  Negative for chest pain.   Gastrointestinal:  Negative  "for abdominal pain, diarrhea and nausea.   Musculoskeletal:  Negative for myalgias.   Neurological:  Positive for headaches. Negative for dizziness and light-headedness.       Objective:   /72 (BP Location: Left arm, Patient Position: Sitting, BP Method: Large (Manual))   Pulse 105   Temp 98.6 °F (37 °C) (Oral)   Resp 16   Ht 6' 1" (1.854 m)   Wt 90.9 kg (200 lb 6.4 oz)   SpO2 95%   BMI 26.44 kg/m²      Physical Exam  Constitutional:       Appearance: Normal appearance. He is ill-appearing (mild).   HENT:      Head: Normocephalic.      Right Ear: Tympanic membrane normal.      Left Ear: Tympanic membrane normal.      Nose: No congestion.      Mouth/Throat:      Mouth: Mucous membranes are moist.      Pharynx: Oropharynx is clear. No oropharyngeal exudate or posterior oropharyngeal erythema.   Eyes:      General:         Right eye: No discharge.         Left eye: No discharge.   Cardiovascular:      Rate and Rhythm: Normal rate and regular rhythm.      Pulses: Normal pulses.      Heart sounds: Normal heart sounds. No murmur heard.     No friction rub. No gallop.   Pulmonary:      Effort: Pulmonary effort is normal. No respiratory distress.      Breath sounds: Normal breath sounds.   Musculoskeletal:      Cervical back: Normal range of motion and neck supple. No tenderness.   Lymphadenopathy:      Cervical: No cervical adenopathy.   Skin:     General: Skin is warm and dry.   Neurological:      Mental Status: He is alert and oriented to person, place, and time.   Psychiatric:         Mood and Affect: Mood normal.         Behavior: Behavior normal.         Assessment & Plan     Problem List Items Addressed This Visit          Psychiatric    Anxiety    Current Assessment & Plan     Stable. Continue current medications.          Alcohol dependence    Current Assessment & Plan     Reduced to just a couple of beers in afternoon. No liquor in a few months.             Cardiac/Vascular    Essential hypertension " "   Current Assessment & Plan     Blood pressure stable. Continue current medications.          Other Visit Diagnoses       Upper respiratory tract infection, unspecified type    -  Primary    Relevant Medications    cetirizine (ZYRTEC) 10 MG tablet    fluticasone propionate (FLONASE) 50 mcg/actuation nasal spray    Cough, unspecified type        Relevant Orders    POCT COVID-19 Rapid Screening (Completed)    Elevated LFTs        Relevant Orders    CBC Auto Differential    COMPREHENSIVE METABOLIC PANEL        Elevated LFT at last visit, will recheck once feeling better. Likely due to ETOH use, but if trending up, will check liver US.     Tylenol/ibuprofen for pain and fever.   Hand hygiene.   Maintain adequate hydration.   Antihistamine and flonase for nasal congestion and upper respiratory symptoms.   Rest.       Follow up if symptoms worsen or fail to improve.            Portions of this note may have been created with voice recognition software. Occasional "wrong-word" or "sound-a-like" substitutions may have occurred due to the inherent limitations of voice recognition software. Please, read the note carefully and recognize, using context, where substitutions have occurred.       "

## 2024-06-04 RX ORDER — FLUOXETINE HYDROCHLORIDE 40 MG/1
40 CAPSULE ORAL
Qty: 90 CAPSULE | Refills: 1 | Status: SHIPPED | OUTPATIENT
Start: 2024-06-04

## 2024-06-14 ENCOUNTER — LAB VISIT (OUTPATIENT)
Dept: LAB | Facility: HOSPITAL | Age: 64
End: 2024-06-14
Attending: NURSE PRACTITIONER
Payer: MEDICARE

## 2024-06-14 ENCOUNTER — OFFICE VISIT (OUTPATIENT)
Dept: INTERNAL MEDICINE | Facility: CLINIC | Age: 64
End: 2024-06-14
Payer: MEDICARE

## 2024-06-14 VITALS
RESPIRATION RATE: 16 BRPM | HEART RATE: 106 BPM | HEIGHT: 73 IN | WEIGHT: 199.94 LBS | OXYGEN SATURATION: 97 % | BODY MASS INDEX: 26.5 KG/M2 | DIASTOLIC BLOOD PRESSURE: 76 MMHG | SYSTOLIC BLOOD PRESSURE: 114 MMHG | TEMPERATURE: 99 F

## 2024-06-14 DIAGNOSIS — F10.29 ALCOHOL DEPENDENCE WITH UNSPECIFIED ALCOHOL-INDUCED DISORDER: ICD-10-CM

## 2024-06-14 DIAGNOSIS — R17 ELEVATED BILIRUBIN: Primary | ICD-10-CM

## 2024-06-14 DIAGNOSIS — R19.5 DARK STOOLS: ICD-10-CM

## 2024-06-14 DIAGNOSIS — R11.2 NAUSEA AND VOMITING, UNSPECIFIED VOMITING TYPE: Primary | ICD-10-CM

## 2024-06-14 DIAGNOSIS — F41.9 ANXIETY: ICD-10-CM

## 2024-06-14 DIAGNOSIS — I10 ESSENTIAL HYPERTENSION: ICD-10-CM

## 2024-06-14 DIAGNOSIS — R11.2 NAUSEA AND VOMITING, UNSPECIFIED VOMITING TYPE: ICD-10-CM

## 2024-06-14 LAB
ALBUMIN SERPL BCP-MCNC: 3.8 G/DL (ref 3.5–5.2)
ALP SERPL-CCNC: 76 U/L (ref 55–135)
ALT SERPL W/O P-5'-P-CCNC: 39 U/L (ref 10–44)
AMYLASE SERPL-CCNC: 71 U/L (ref 20–110)
ANION GAP SERPL CALC-SCNC: 11 MMOL/L (ref 8–16)
AST SERPL-CCNC: 42 U/L (ref 10–40)
BASOPHILS # BLD AUTO: 0.04 K/UL (ref 0–0.2)
BASOPHILS NFR BLD: 0.5 % (ref 0–1.9)
BILIRUB SERPL-MCNC: 1.3 MG/DL (ref 0.1–1)
BUN SERPL-MCNC: 12 MG/DL (ref 8–23)
CALCIUM SERPL-MCNC: 9.3 MG/DL (ref 8.7–10.5)
CHLORIDE SERPL-SCNC: 104 MMOL/L (ref 95–110)
CO2 SERPL-SCNC: 21 MMOL/L (ref 23–29)
CREAT SERPL-MCNC: 1.2 MG/DL (ref 0.5–1.4)
DIFFERENTIAL METHOD BLD: ABNORMAL
EOSINOPHIL # BLD AUTO: 0 K/UL (ref 0–0.5)
EOSINOPHIL NFR BLD: 0.5 % (ref 0–8)
ERYTHROCYTE [DISTWIDTH] IN BLOOD BY AUTOMATED COUNT: 12.5 % (ref 11.5–14.5)
EST. GFR  (NO RACE VARIABLE): >60 ML/MIN/1.73 M^2
GLUCOSE SERPL-MCNC: 112 MG/DL (ref 70–110)
HCT VFR BLD AUTO: 40.8 % (ref 40–54)
HGB BLD-MCNC: 14.6 G/DL (ref 14–18)
IMM GRANULOCYTES # BLD AUTO: 0.03 K/UL (ref 0–0.04)
IMM GRANULOCYTES NFR BLD AUTO: 0.4 % (ref 0–0.5)
LIPASE SERPL-CCNC: 46 U/L (ref 4–60)
LYMPHOCYTES # BLD AUTO: 1.7 K/UL (ref 1–4.8)
LYMPHOCYTES NFR BLD: 20 % (ref 18–48)
MCH RBC QN AUTO: 34.9 PG (ref 27–31)
MCHC RBC AUTO-ENTMCNC: 35.8 G/DL (ref 32–36)
MCV RBC AUTO: 98 FL (ref 82–98)
MONOCYTES # BLD AUTO: 0.9 K/UL (ref 0.3–1)
MONOCYTES NFR BLD: 10 % (ref 4–15)
NEUTROPHILS # BLD AUTO: 5.9 K/UL (ref 1.8–7.7)
NEUTROPHILS NFR BLD: 68.6 % (ref 38–73)
NRBC BLD-RTO: 0 /100 WBC
PLATELET # BLD AUTO: 252 K/UL (ref 150–450)
PMV BLD AUTO: 9.4 FL (ref 9.2–12.9)
POTASSIUM SERPL-SCNC: 4.2 MMOL/L (ref 3.5–5.1)
PROT SERPL-MCNC: 7.1 G/DL (ref 6–8.4)
RBC # BLD AUTO: 4.18 M/UL (ref 4.6–6.2)
SODIUM SERPL-SCNC: 136 MMOL/L (ref 136–145)
WBC # BLD AUTO: 8.54 K/UL (ref 3.9–12.7)

## 2024-06-14 PROCEDURE — 4010F ACE/ARB THERAPY RXD/TAKEN: CPT | Mod: CPTII,S$GLB,, | Performed by: NURSE PRACTITIONER

## 2024-06-14 PROCEDURE — 3074F SYST BP LT 130 MM HG: CPT | Mod: CPTII,S$GLB,, | Performed by: NURSE PRACTITIONER

## 2024-06-14 PROCEDURE — 1159F MED LIST DOCD IN RCRD: CPT | Mod: CPTII,S$GLB,, | Performed by: NURSE PRACTITIONER

## 2024-06-14 PROCEDURE — 3008F BODY MASS INDEX DOCD: CPT | Mod: CPTII,S$GLB,, | Performed by: NURSE PRACTITIONER

## 2024-06-14 PROCEDURE — 36415 COLL VENOUS BLD VENIPUNCTURE: CPT | Mod: PO | Performed by: NURSE PRACTITIONER

## 2024-06-14 PROCEDURE — 99999 PR PBB SHADOW E&M-EST. PATIENT-LVL IV: CPT | Mod: PBBFAC,,, | Performed by: NURSE PRACTITIONER

## 2024-06-14 PROCEDURE — 83690 ASSAY OF LIPASE: CPT | Mod: PO | Performed by: NURSE PRACTITIONER

## 2024-06-14 PROCEDURE — 3078F DIAST BP <80 MM HG: CPT | Mod: CPTII,S$GLB,, | Performed by: NURSE PRACTITIONER

## 2024-06-14 PROCEDURE — 85025 COMPLETE CBC W/AUTO DIFF WBC: CPT | Mod: PO | Performed by: NURSE PRACTITIONER

## 2024-06-14 PROCEDURE — 82150 ASSAY OF AMYLASE: CPT | Mod: PO | Performed by: NURSE PRACTITIONER

## 2024-06-14 PROCEDURE — 99214 OFFICE O/P EST MOD 30 MIN: CPT | Mod: S$GLB,,, | Performed by: NURSE PRACTITIONER

## 2024-06-14 PROCEDURE — 1160F RVW MEDS BY RX/DR IN RCRD: CPT | Mod: CPTII,S$GLB,, | Performed by: NURSE PRACTITIONER

## 2024-06-14 PROCEDURE — 80053 COMPREHEN METABOLIC PANEL: CPT | Mod: PO | Performed by: NURSE PRACTITIONER

## 2024-06-14 RX ORDER — ONDANSETRON 4 MG/1
4 TABLET, ORALLY DISINTEGRATING ORAL EVERY 8 HOURS PRN
Qty: 20 TABLET | Refills: 0 | Status: SHIPPED | OUTPATIENT
Start: 2024-06-14

## 2024-06-14 NOTE — ASSESSMENT & PLAN NOTE
Patient feels that some symptoms may be stress related due to increased stressors with son. Continue current medications for now.

## 2024-06-14 NOTE — PROGRESS NOTES
Subjective:       Patient ID: Aguilar Rowe is a 64 y.o. male.    Chief Complaint: dark stool (X 2 days) and Abdominal Pain    Mrs. Rowe presents to visit for complaint of abdominal pain, onset 1 week ago. Reports black stool for past 2 days, but normal color today, still soft. Was having nausea and vomiting as well. Intermittent vomiting for past week, had approx 2-4 episodes a day. Last episode of emesis was this morning. Denies any blood in emesis. Did take some pepto bismol a few times this week.  Patient believes that some of his symptoms may be anxiety and stress related due to some increased stressors with some problems with his son who is living with him currently.    Patient also does continue to drink approximately 6 pack of beer a day.  Has not had any beer in the past week.    Abdominal Pain  This is a new problem. The current episode started in the past 7 days. The onset quality is sudden. The problem occurs intermittently. The problem has been waxing and waning. The pain is located in the generalized abdominal region and periumbilical region. The quality of the pain is aching and cramping. The abdominal pain does not radiate. Associated symptoms include diarrhea, flatus, headaches, myalgias, nausea and vomiting. Pertinent negatives include no belching, constipation, dysuria, fever, frequency or hematuria. Exacerbated by: stress. Treatments tried: pepto.       Patient Active Problem List   Diagnosis    Anxiety    Essential hypertension    Alcohol dependence    Right carpal tunnel syndrome    Pain of right hand    Hiatal hernia    Decreased range of motion    Decreased muscle strength    Abnormal nuclear stress test    Closed fracture of orbit    Hypercholesterolemia    Closed fracture of right wrist    Skin lesion of scalp    Dark stools       Family History   Problem Relation Name Age of Onset    No Known Problems Mother      Lung cancer Father      No Known Problems Sister       Past Surgical  History:   Procedure Laterality Date    APPENDECTOMY      CARPAL TUNNEL RELEASE Right 9/9/2021    Procedure: RELEASE, CARPAL TUNNEL;  Surgeon: Nilton Dubose MD;  Location: [facility] OR;  Service: Orthopedics;  Laterality: Right;  right carpal tunnel release and right long and ring trigger finger release    CARPECTOMY Right 11/14/2022    Procedure: CARPECTOMY;  Surgeon: Nilton Dubose MD;  Location: [facility] OR;  Service: Orthopedics;  Laterality: Right;  proximal row carpectomy    COLONOSCOPY N/A 3/9/2023    Procedure: COLONOSCOPY;  Surgeon: Hailey Johnson MD;  Location: [facility] ENDO;  Service: Endoscopy;  Laterality: N/A;    FINGER SURGERY Left     SHOULDER SURGERY Right     TRIGGER FINGER RELEASE Right 9/9/2021    Procedure: RELEASE, TRIGGER FINGER;  Surgeon: Nilton Dubose MD;  Location: [facility] OR;  Service: Orthopedics;  Laterality: Right;  right carpal tunnel release and right long and ring trigger finger release         Current Outpatient Medications:     acetaminophen (TYLENOL 8 HOUR ORAL), Take by mouth every 8 (eight) hours as needed., Disp: , Rfl:     aspirin (ECOTRIN) 81 MG EC tablet, Take 81 mg by mouth once daily., Disp: , Rfl:     atorvastatin (LIPITOR) 20 MG tablet, Take 1 tablet (20 mg total) by mouth once daily., Disp: 90 tablet, Rfl: 3    cetirizine (ZYRTEC) 10 MG tablet, Take 1 tablet (10 mg total) by mouth once daily., Disp: 30 tablet, Rfl: 0    FLUoxetine 40 MG capsule, TAKE 1 CAPSULE(40 MG) BY MOUTH EVERY DAY., Disp: 90 capsule, Rfl: 1    fluticasone propionate (FLONASE) 50 mcg/actuation nasal spray, 2 sprays (100 mcg total) by Each Nostril route once daily., Disp: 16 g, Rfl: 0    hydrOXYzine pamoate (VISTARIL) 25 MG Cap, TAKE 1 CAPSULE(25 MG) BY MOUTH EVERY 6 HOURS AS NEEDED FOR ANXIETY, Disp: 30 capsule, Rfl: 5    lisinopriL 10 MG tablet, Take 1 tablet (10 mg total) by mouth once daily., Disp: 90 tablet, Rfl: 3    traZODone (DESYREL) 50 MG tablet, Take 1 tablet (50 mg total)  "by mouth every evening., Disp: 90 tablet, Rfl: 3    ondansetron (ZOFRAN-ODT) 4 MG TbDL, Take 1 tablet (4 mg total) by mouth every 8 (eight) hours as needed (nausea)., Disp: 20 tablet, Rfl: 0    Review of Systems   Constitutional:  Positive for activity change, appetite change and fatigue. Negative for chills and fever.   Respiratory:  Negative for cough and shortness of breath.    Cardiovascular:  Negative for chest pain and palpitations.   Gastrointestinal:  Positive for abdominal pain, diarrhea, flatus, nausea and vomiting. Negative for anal bleeding, blood in stool and constipation.   Genitourinary:  Negative for dysuria, flank pain, frequency, hematuria and urgency.   Musculoskeletal:  Positive for myalgias.   Neurological:  Positive for headaches. Negative for dizziness and light-headedness.   Psychiatric/Behavioral:  Negative for dysphoric mood. The patient is nervous/anxious.        Objective:   /76 (BP Location: Left arm, Patient Position: Lying, BP Method: Large (Manual))   Pulse 106   Temp 99 °F (37.2 °C) (Oral)   Resp 16   Ht 6' 1" (1.854 m)   Wt 90.7 kg (199 lb 15.3 oz)   SpO2 97%   BMI 26.38 kg/m²      Physical Exam  Constitutional:       General: He is not in acute distress.     Appearance: Normal appearance. He is ill-appearing (Mild).   HENT:      Head: Normocephalic.   Cardiovascular:      Rate and Rhythm: Normal rate and regular rhythm.      Pulses: Normal pulses.      Heart sounds: Normal heart sounds. No murmur heard.     No gallop.   Pulmonary:      Effort: Pulmonary effort is normal. No respiratory distress.      Breath sounds: Normal breath sounds.   Abdominal:      Palpations: Abdomen is soft.      Tenderness: There is abdominal tenderness in the right lower quadrant and left lower quadrant. There is no right CVA tenderness, left CVA tenderness or guarding.      Hernia: A hernia is present. Hernia is present in the umbilical area (soft).   Skin:     General: Skin is warm and dry. " "     Coloration: Skin is not pale.      Findings: No erythema.   Neurological:      Mental Status: He is alert.   Psychiatric:         Mood and Affect: Mood normal.         Behavior: Behavior normal.         Assessment & Plan     Problem List Items Addressed This Visit          Psychiatric    Anxiety    Current Assessment & Plan     Patient feels that some symptoms may be stress related due to increased stressors with son. Continue current medications for now.          Alcohol dependence    Current Assessment & Plan     Continues to drink beer daily, approx 6 pack beer. Has cut out liquor. No ETOH this week while sick.             Cardiac/Vascular    Essential hypertension    Current Assessment & Plan     Blood pressure stable. Continue current medications.            GI    Dark stools    Current Assessment & Plan     Likely due to pepto. Declined occult stool testing today.          Relevant Orders    CBC Auto Differential (Completed)     Other Visit Diagnoses       Nausea and vomiting, unspecified vomiting type    -  Primary    Relevant Medications    ondansetron (ZOFRAN-ODT) 4 MG TbDL    Other Relevant Orders    COMPREHENSIVE METABOLIC PANEL (Completed)    LIPASE (Completed)    AMYLASE (Completed)    CBC Auto Differential (Completed)        No significant acute abdominal concerns on exam.  Discussed red flag signs and when to return to clinic or go to ER.  Zofran Rx sent to pharmacy.  Will complete labs today.  Discussed a occult stool test, patient declined at this time.  ETOH cessation. Will proceed with US liver if enzymes continue to be elevated.     Follow up if symptoms worsen or fail to improve.          Portions of this note may have been created with voice recognition software. Occasional "wrong-word" or "sound-a-like" substitutions may have occurred due to the inherent limitations of voice recognition software. Please, read the note carefully and recognize, using context, where substitutions have " occurred.

## 2024-06-14 NOTE — ASSESSMENT & PLAN NOTE
Continues to drink beer daily, approx 6 pack beer. Has cut out liquor. No ETOH this week while sick.

## 2024-06-17 ENCOUNTER — HOSPITAL ENCOUNTER (OUTPATIENT)
Dept: RADIOLOGY | Facility: HOSPITAL | Age: 64
Discharge: HOME | End: 2024-06-17
Attending: NURSE PRACTITIONER
Payer: MEDICARE

## 2024-06-17 DIAGNOSIS — R17 ELEVATED BILIRUBIN: ICD-10-CM

## 2024-06-17 PROCEDURE — 76705 ECHO EXAM OF ABDOMEN: CPT | Mod: TC,PO

## 2024-06-17 PROCEDURE — 76705 ECHO EXAM OF ABDOMEN: CPT | Mod: 26,,, | Performed by: STUDENT IN AN ORGANIZED HEALTH CARE EDUCATION/TRAINING PROGRAM

## 2024-06-18 ENCOUNTER — TELEPHONE (OUTPATIENT)
Dept: INTERNAL MEDICINE | Facility: CLINIC | Age: 64
End: 2024-06-18
Payer: MEDICARE

## 2024-06-19 DIAGNOSIS — K76.0 FATTY LIVER: ICD-10-CM

## 2024-06-19 DIAGNOSIS — K76.89 HEPATIC CYST: Primary | ICD-10-CM

## 2024-06-24 ENCOUNTER — TELEPHONE (OUTPATIENT)
Dept: INTERNAL MEDICINE | Facility: CLINIC | Age: 64
End: 2024-06-24
Payer: COMMERCIAL

## 2024-06-24 NOTE — TELEPHONE ENCOUNTER
----- Message from Radha Dye sent at 6/24/2024  2:16 PM CDT -----  Regarding: missed call  Type:  Patient Returning Call    Who Called: pt  Who Left Message for Patient: Ana María stern   Does the patient know what this is regarding?: results   Would the patient rather a call back or a response via [facility]? call  Best Call Back Number: [phone]   Additional Information:

## 2024-06-24 NOTE — TELEPHONE ENCOUNTER
Patient told to look on the back of his insurance card to see what provider accepts his insurance and a referral will be sent over.

## 2024-07-01 ENCOUNTER — TELEPHONE (OUTPATIENT)
Dept: INTERNAL MEDICINE | Facility: CLINIC | Age: 64
End: 2024-07-01
Payer: COMMERCIAL

## 2024-07-01 DIAGNOSIS — K76.89 HEPATIC CYST: Primary | ICD-10-CM

## 2024-07-01 DIAGNOSIS — K76.0 FATTY LIVER: ICD-10-CM

## 2024-07-01 NOTE — TELEPHONE ENCOUNTER
----- Message from Stefan Reyes sent at 7/1/2024  7:18 AM CDT -----  Contact: Meghann/wife  Meghann/wife would like a call back at  [phone] in regards to needing to speak with nurse about imaging that the patient had done supposed to be sent over to [facility] with  . patient is scheduled for Wednesday 7/3/24 and needing them at that office before then.  Thanks   Am   Thanks   Am

## 2024-07-09 RX ORDER — TRAZODONE HYDROCHLORIDE 50 MG/1
50 TABLET ORAL NIGHTLY
Qty: 90 TABLET | Refills: 3 | Status: SHIPPED | OUTPATIENT
Start: 2024-07-09

## 2024-08-20 DIAGNOSIS — J06.9 UPPER RESPIRATORY TRACT INFECTION, UNSPECIFIED TYPE: ICD-10-CM

## 2024-08-20 RX ORDER — CETIRIZINE HYDROCHLORIDE 10 MG/1
10 TABLET ORAL
Qty: 30 TABLET | Refills: 0 | Status: SHIPPED | OUTPATIENT
Start: 2024-08-20

## 2024-10-21 ENCOUNTER — OFFICE VISIT (OUTPATIENT)
Dept: INTERNAL MEDICINE | Facility: CLINIC | Age: 64
End: 2024-10-21
Payer: MEDICARE

## 2024-10-21 VITALS
TEMPERATURE: 99 F | HEIGHT: 73 IN | DIASTOLIC BLOOD PRESSURE: 84 MMHG | OXYGEN SATURATION: 98 % | SYSTOLIC BLOOD PRESSURE: 138 MMHG | HEART RATE: 97 BPM | BODY MASS INDEX: 27.43 KG/M2 | WEIGHT: 207 LBS

## 2024-10-21 DIAGNOSIS — S61.212D LACERATION OF RIGHT MIDDLE FINGER WITHOUT FOREIGN BODY WITHOUT DAMAGE TO NAIL, SUBSEQUENT ENCOUNTER: Primary | ICD-10-CM

## 2024-10-21 DIAGNOSIS — I10 ESSENTIAL HYPERTENSION: ICD-10-CM

## 2024-10-21 DIAGNOSIS — Z48.02 ENCOUNTER FOR REMOVAL OF SUTURES: ICD-10-CM

## 2024-10-21 PROCEDURE — 1160F RVW MEDS BY RX/DR IN RCRD: CPT | Mod: CPTII,S$GLB,, | Performed by: NURSE PRACTITIONER

## 2024-10-21 PROCEDURE — 1159F MED LIST DOCD IN RCRD: CPT | Mod: CPTII,S$GLB,, | Performed by: NURSE PRACTITIONER

## 2024-10-21 PROCEDURE — 3008F BODY MASS INDEX DOCD: CPT | Mod: CPTII,S$GLB,, | Performed by: NURSE PRACTITIONER

## 2024-10-21 PROCEDURE — 3075F SYST BP GE 130 - 139MM HG: CPT | Mod: CPTII,S$GLB,, | Performed by: NURSE PRACTITIONER

## 2024-10-21 PROCEDURE — 99999 PR PBB SHADOW E&M-EST. PATIENT-LVL IV: CPT | Mod: PBBFAC,,, | Performed by: NURSE PRACTITIONER

## 2024-10-21 PROCEDURE — 4010F ACE/ARB THERAPY RXD/TAKEN: CPT | Mod: CPTII,S$GLB,, | Performed by: NURSE PRACTITIONER

## 2024-10-21 PROCEDURE — 15853 REMOVAL SUTR/STAPL XREQ ANES: CPT | Mod: S$GLB,,, | Performed by: NURSE PRACTITIONER

## 2024-10-21 PROCEDURE — 3079F DIAST BP 80-89 MM HG: CPT | Mod: CPTII,S$GLB,, | Performed by: NURSE PRACTITIONER

## 2024-10-21 PROCEDURE — 99213 OFFICE O/P EST LOW 20 MIN: CPT | Mod: S$GLB,,, | Performed by: NURSE PRACTITIONER

## 2024-10-21 RX ORDER — CEPHALEXIN 500 MG/1
500 CAPSULE ORAL 4 TIMES DAILY
COMMUNITY
Start: 2024-10-14

## 2024-10-21 NOTE — PROGRESS NOTES
Subjective:       Patient ID: Aguilar Rowe is a 64 y.o. male.    Chief Complaint: Suture / Staple Removal (Right middle finger, 4 sutures)    History of Present Illness             Mr. Rowe presents to clinic for suture removal. He was seen at [facility] ED on 10/14 for laceration to right middle digit after cuttingo n a piece of tin. XR right finger was negative for acute findings. 4 sutures were placed. He received Tdap and prescribed cephalexin x 5 days. Today reports he finished antibiotic. Denies fever, chills, or body aches.        Patient Active Problem List   Diagnosis    Anxiety    Essential hypertension    Alcohol dependence    Right carpal tunnel syndrome    Pain of right hand    Hiatal hernia    Decreased range of motion    Decreased muscle strength    Abnormal nuclear stress test    Closed fracture of orbit    Hypercholesterolemia    Closed fracture of right wrist    Skin lesion of scalp    Dark stools       Family History   Problem Relation Name Age of Onset    No Known Problems Mother      Lung cancer Father      No Known Problems Sister       Past Surgical History:   Procedure Laterality Date    APPENDECTOMY      CARPAL TUNNEL RELEASE Right 9/9/2021    Procedure: RELEASE, CARPAL TUNNEL;  Surgeon: Nilton Dubose MD;  Location: [facility] OR;  Service: Orthopedics;  Laterality: Right;  right carpal tunnel release and right long and ring trigger finger release    CARPECTOMY Right 11/14/2022    Procedure: CARPECTOMY;  Surgeon: Nilton Dubose MD;  Location: [facility] OR;  Service: Orthopedics;  Laterality: Right;  proximal row carpectomy    COLONOSCOPY N/A 3/9/2023    Procedure: COLONOSCOPY;  Surgeon: Hailey Johnson MD;  Location: [facility] ENDO;  Service: Endoscopy;  Laterality: N/A;    FINGER SURGERY Left     SHOULDER SURGERY Right     TRIGGER FINGER RELEASE Right 9/9/2021    Procedure: RELEASE, TRIGGER FINGER;  Surgeon: Nilton Dubose MD;  Location: [facility] OR;  Service: Orthopedics;   "Laterality: Right;  right carpal tunnel release and right long and ring trigger finger release         Current Outpatient Medications:     acetaminophen (TYLENOL 8 HOUR ORAL), Take by mouth every 8 (eight) hours as needed., Disp: , Rfl:     aspirin (ECOTRIN) 81 MG EC tablet, Take 81 mg by mouth once daily., Disp: , Rfl:     atorvastatin (LIPITOR) 20 MG tablet, Take 1 tablet (20 mg total) by mouth once daily., Disp: 90 tablet, Rfl: 3    cephALEXin (KEFLEX) 500 MG capsule, Take 500 mg by mouth 4 (four) times daily., Disp: , Rfl:     cetirizine (ZYRTEC) 10 MG tablet, TAKE 1 TABLET BY MOUTH ONCE DAILY, Disp: 30 tablet, Rfl: 0    FLUoxetine 40 MG capsule, TAKE 1 CAPSULE(40 MG) BY MOUTH EVERY DAY., Disp: 90 capsule, Rfl: 1    fluticasone propionate (FLONASE) 50 mcg/actuation nasal spray, 2 sprays (100 mcg total) by Each Nostril route once daily., Disp: 16 g, Rfl: 0    hydrOXYzine pamoate (VISTARIL) 25 MG Cap, TAKE 1 CAPSULE(25 MG) BY MOUTH EVERY 6 HOURS AS NEEDED FOR ANXIETY, Disp: 30 capsule, Rfl: 5    lisinopriL 10 MG tablet, Take 1 tablet (10 mg total) by mouth once daily., Disp: 90 tablet, Rfl: 3    ondansetron (ZOFRAN-ODT) 4 MG TbDL, Take 1 tablet (4 mg total) by mouth every 8 (eight) hours as needed (nausea)., Disp: 20 tablet, Rfl: 0    traZODone (DESYREL) 50 MG tablet, TAKE 1 TABLET(50 MG) BY MOUTH EVERY EVENING, Disp: 90 tablet, Rfl: 3    Review of Systems   Constitutional:  Negative for chills and fever.   Musculoskeletal:  Negative for arthralgias and myalgias.   Skin:  Positive for wound (improving).       Objective:   /84 (BP Location: Left arm, Patient Position: Sitting)   Pulse 97   Temp 98.5 °F (36.9 °C) (Tympanic)   Ht 6' 1" (1.854 m)   Wt 93.9 kg (207 lb 0.2 oz)   SpO2 98%   BMI 27.31 kg/m²      Physical Exam  Vitals reviewed.   Constitutional:       General: He is not in acute distress.     Appearance: Normal appearance. He is not ill-appearing, toxic-appearing or diaphoretic.   Eyes:      " "Conjunctiva/sclera: Conjunctivae normal.   Cardiovascular:      Rate and Rhythm: Normal rate.   Pulmonary:      Effort: Pulmonary effort is normal. No respiratory distress.   Skin:     Findings: Laceration present.          Neurological:      Mental Status: He is alert and oriented to person, place, and time.   Psychiatric:         Mood and Affect: Mood normal.         Behavior: Behavior normal.         Assessment & Plan     Assessment & Plan             1. Laceration of right middle finger without foreign body without damage to nail, subsequent encounter  Comments:  4 sutures removed. Continue abx ointment and dressing. Reviewed signs of infection and RTC precautions.  Orders:  -     Suture Removal    2. Encounter for removal of sutures  -     Suture Removal    3. Essential hypertension  Assessment & Plan:  Initially elevated. Improved with repeat reading. Continue lisinopril            ESDRAS Hurd    This note was generated with the assistance of ambient listening technology. Verbal consent was obtained by the patient and accompanying visitor(s) for the recording of patient appointment to facilitate this note. I attest to having reviewed and edited the generated note for accuracy, though some syntax or spelling errors may persist. Please contact the author of this note for any clarification.       Portions of this note may have been created with voice recognition software. Occasional "wrong-word" or "sound-a-like" substitutions may have occurred due to the inherent limitations of voice recognition software. Please, read the note carefully and recognize, using context, where substitutions have occurred.        "

## 2024-10-21 NOTE — PROCEDURES
Suture Removal    Date/Time: 10/21/2024 8:40 AM  Location procedure was performed: [facility] INTERNAL MEDICINE    Performed by: Rosalie Dexter FNP-C  Authorized by: Rosalie Dexter FNP-C  Body area: upper extremity  Location details: right long finger  Wound Appearance: clean and no drainage  Sutures Removed: 4  Staples Removed: 0  Post-removal: bandaid applied and antibiotic ointment applied  Complications: No  Estimated blood loss (mL): 0  Specimens: No  Implants: No  Patient tolerance: Patient tolerated the procedure well with no immediate complications

## 2024-10-25 ENCOUNTER — TELEPHONE (OUTPATIENT)
Dept: INTERNAL MEDICINE | Facility: CLINIC | Age: 64
End: 2024-10-25
Payer: MEDICARE

## 2024-10-25 NOTE — TELEPHONE ENCOUNTER
Patient insurance came out for a home physical and blood pressure was 160-92 :Repeat blood pressure was 158/92. Patient denies any symptoms. Patient has been schedule for Monday along with BP Log. Advise if blood pressure get too high and start to fell different needs to go to ER. Understanding voice

## 2024-10-25 NOTE — TELEPHONE ENCOUNTER
----- Message from Prudencio sent at 10/25/2024 12:24 PM CDT -----  Contact: wife @ [phone]  Patient is calling for Medical Advice regarding: high blood pressure      How long has patient had these symptoms: 1 week     Pharmacy name and phone#:       [id number] - [place] - [address] AT [facility]  [address] [place] [address]  Phone: [phone] Fax: [phone]        Patient wants a call back or thru [facility]: call back     Comments:     Please advise patient replies from provider may take up to 48 hours

## 2024-10-29 ENCOUNTER — LAB VISIT (OUTPATIENT)
Dept: LAB | Facility: HOSPITAL | Age: 64
End: 2024-10-29
Attending: NURSE PRACTITIONER
Payer: MEDICARE

## 2024-10-29 ENCOUNTER — OFFICE VISIT (OUTPATIENT)
Dept: INTERNAL MEDICINE | Facility: CLINIC | Age: 64
End: 2024-10-29
Payer: MEDICARE

## 2024-10-29 VITALS
OXYGEN SATURATION: 96 % | DIASTOLIC BLOOD PRESSURE: 82 MMHG | BODY MASS INDEX: 27.85 KG/M2 | WEIGHT: 210.13 LBS | TEMPERATURE: 98 F | HEART RATE: 94 BPM | SYSTOLIC BLOOD PRESSURE: 124 MMHG | HEIGHT: 73 IN

## 2024-10-29 DIAGNOSIS — K76.89 HEPATIC CYST: ICD-10-CM

## 2024-10-29 DIAGNOSIS — E87.1 HYPONATREMIA: ICD-10-CM

## 2024-10-29 DIAGNOSIS — I10 ESSENTIAL HYPERTENSION: Primary | ICD-10-CM

## 2024-10-29 DIAGNOSIS — K76.0 FATTY LIVER: ICD-10-CM

## 2024-10-29 DIAGNOSIS — Z12.5 PROSTATE CANCER SCREENING: ICD-10-CM

## 2024-10-29 DIAGNOSIS — F10.29 ALCOHOL DEPENDENCE WITH UNSPECIFIED ALCOHOL-INDUCED DISORDER: ICD-10-CM

## 2024-10-29 PROBLEM — S62.101A CLOSED FRACTURE OF RIGHT WRIST: Status: RESOLVED | Noted: 2022-10-26 | Resolved: 2024-10-29

## 2024-10-29 LAB
ALBUMIN SERPL BCP-MCNC: 3.6 G/DL (ref 3.5–5.2)
ALP SERPL-CCNC: 56 U/L (ref 40–150)
ALT SERPL W/O P-5'-P-CCNC: 35 U/L (ref 10–44)
ANION GAP SERPL CALC-SCNC: 9 MMOL/L (ref 8–16)
AST SERPL-CCNC: 56 U/L (ref 10–40)
BILIRUB SERPL-MCNC: 0.8 MG/DL (ref 0.1–1)
BUN SERPL-MCNC: 13 MG/DL (ref 8–23)
CALCIUM SERPL-MCNC: 8.7 MG/DL (ref 8.7–10.5)
CHLORIDE SERPL-SCNC: 108 MMOL/L (ref 95–110)
CO2 SERPL-SCNC: 19 MMOL/L (ref 23–29)
COMPLEXED PSA SERPL-MCNC: 0.26 NG/ML (ref 0–4)
CREAT SERPL-MCNC: 1.1 MG/DL (ref 0.5–1.4)
EST. GFR  (NO RACE VARIABLE): >60 ML/MIN/1.73 M^2
GLUCOSE SERPL-MCNC: 117 MG/DL (ref 70–110)
POTASSIUM SERPL-SCNC: 5.2 MMOL/L (ref 3.5–5.1)
PROT SERPL-MCNC: 7.5 G/DL (ref 6–8.4)
SODIUM SERPL-SCNC: 136 MMOL/L (ref 136–145)

## 2024-10-29 PROCEDURE — 1160F RVW MEDS BY RX/DR IN RCRD: CPT | Mod: CPTII,S$GLB,, | Performed by: NURSE PRACTITIONER

## 2024-10-29 PROCEDURE — 99214 OFFICE O/P EST MOD 30 MIN: CPT | Mod: S$GLB,,, | Performed by: NURSE PRACTITIONER

## 2024-10-29 PROCEDURE — 3008F BODY MASS INDEX DOCD: CPT | Mod: CPTII,S$GLB,, | Performed by: NURSE PRACTITIONER

## 2024-10-29 PROCEDURE — 99999 PR PBB SHADOW E&M-EST. PATIENT-LVL III: CPT | Mod: PBBFAC,,, | Performed by: NURSE PRACTITIONER

## 2024-10-29 PROCEDURE — 1159F MED LIST DOCD IN RCRD: CPT | Mod: CPTII,S$GLB,, | Performed by: NURSE PRACTITIONER

## 2024-10-29 PROCEDURE — 84153 ASSAY OF PSA TOTAL: CPT | Performed by: NURSE PRACTITIONER

## 2024-10-29 PROCEDURE — 36415 COLL VENOUS BLD VENIPUNCTURE: CPT | Mod: PO | Performed by: NURSE PRACTITIONER

## 2024-10-29 PROCEDURE — 3079F DIAST BP 80-89 MM HG: CPT | Mod: CPTII,S$GLB,, | Performed by: NURSE PRACTITIONER

## 2024-10-29 PROCEDURE — 4010F ACE/ARB THERAPY RXD/TAKEN: CPT | Mod: CPTII,S$GLB,, | Performed by: NURSE PRACTITIONER

## 2024-10-29 PROCEDURE — G2211 COMPLEX E/M VISIT ADD ON: HCPCS | Mod: S$GLB,,, | Performed by: NURSE PRACTITIONER

## 2024-10-29 PROCEDURE — 3074F SYST BP LT 130 MM HG: CPT | Mod: CPTII,S$GLB,, | Performed by: NURSE PRACTITIONER

## 2024-10-29 PROCEDURE — 80053 COMPREHEN METABOLIC PANEL: CPT | Mod: PO | Performed by: NURSE PRACTITIONER

## 2024-10-30 DIAGNOSIS — E87.5 HYPERKALEMIA: Primary | ICD-10-CM

## 2024-11-01 ENCOUNTER — TELEPHONE (OUTPATIENT)
Dept: INTERNAL MEDICINE | Facility: CLINIC | Age: 64
End: 2024-11-01
Payer: MEDICARE

## 2024-11-05 ENCOUNTER — LAB VISIT (OUTPATIENT)
Dept: LAB | Facility: HOSPITAL | Age: 64
End: 2024-11-05
Attending: NURSE PRACTITIONER
Payer: MEDICARE

## 2024-11-05 DIAGNOSIS — E87.5 HYPERKALEMIA: ICD-10-CM

## 2024-11-05 LAB — POTASSIUM SERPL-SCNC: 4.1 MMOL/L (ref 3.5–5.1)

## 2024-11-05 PROCEDURE — 84132 ASSAY OF SERUM POTASSIUM: CPT | Mod: PO | Performed by: NURSE PRACTITIONER

## 2024-11-05 PROCEDURE — 36415 COLL VENOUS BLD VENIPUNCTURE: CPT | Mod: PO | Performed by: NURSE PRACTITIONER

## 2024-12-07 DIAGNOSIS — J06.9 UPPER RESPIRATORY TRACT INFECTION, UNSPECIFIED TYPE: ICD-10-CM

## 2024-12-09 DIAGNOSIS — J06.9 UPPER RESPIRATORY TRACT INFECTION, UNSPECIFIED TYPE: ICD-10-CM

## 2024-12-09 DIAGNOSIS — I10 ESSENTIAL HYPERTENSION: ICD-10-CM

## 2024-12-09 RX ORDER — ATORVASTATIN CALCIUM 20 MG/1
20 TABLET, FILM COATED ORAL DAILY
Qty: 90 TABLET | Refills: 3 | Status: SHIPPED | OUTPATIENT
Start: 2024-12-09

## 2024-12-09 RX ORDER — CETIRIZINE HYDROCHLORIDE 10 MG/1
10 TABLET ORAL DAILY
Qty: 30 TABLET | Refills: 0 | Status: SHIPPED | OUTPATIENT
Start: 2024-12-09

## 2024-12-09 RX ORDER — CETIRIZINE HYDROCHLORIDE 10 MG/1
10 TABLET ORAL
Qty: 30 TABLET | Refills: 0 | Status: SHIPPED | OUTPATIENT
Start: 2024-12-09

## 2024-12-09 RX ORDER — FLUOXETINE HYDROCHLORIDE 40 MG/1
40 CAPSULE ORAL
Qty: 90 CAPSULE | Refills: 1 | Status: SHIPPED | OUTPATIENT
Start: 2024-12-09

## 2024-12-09 RX ORDER — LISINOPRIL 10 MG/1
10 TABLET ORAL DAILY
Qty: 90 TABLET | Refills: 3 | Status: SHIPPED | OUTPATIENT
Start: 2024-12-09

## 2024-12-09 NOTE — TELEPHONE ENCOUNTER
Refill Routing Note   Medication(s) are not appropriate for processing by [facility] for the following reason(s):        Non-participating provider    [facility] action(s):  Route             Appointments  past 12m or future 3m with PCP    Date Provider   Last Visit   10/29/2024 Rocio Newman NP   Next Visit   12/9/2024 Rocio Newman NP   ED visits in past 90 days: 0        Note composed:8:53 AM 12/09/2024

## 2024-12-09 NOTE — TELEPHONE ENCOUNTER
----- Message from Mailefrancisco j sent at 12/9/2024  8:30 AM CST -----  Contact: [phone]  Type:  RX Refill Request    Who Called: wife  Refill or New Rx:refill  RX Name and Strength:#cetirizine (ZYRTEC) 10 MG tablet/  FLUoxetine 40 MG capsule/ atorvastatin (LIPITOR) 20 MG tablet/  lisinopriL 10 MG tablet  How is the patient currently taking it? (ex. 1XDay):  Is this a 30 day or 90 day RX:  Preferred Pharmacy with phone number: [phone] Fax: [phone]    Local or Mail Order:local  Ordering Provider:[facility]  Would the patient rather a call back or a response via [facility]? Call back  Best Call Back Number: [phone]  Additional Information: [id number]... COMPLETELY OUT          [facility] #[id number]  [place] - [address] AT [facility]  [address] [place] [address]  Phone: [phone] Fax: [phone]

## 2025-02-07 DIAGNOSIS — J06.9 UPPER RESPIRATORY TRACT INFECTION, UNSPECIFIED TYPE: ICD-10-CM

## 2025-02-07 RX ORDER — CETIRIZINE HYDROCHLORIDE 10 MG/1
10 TABLET ORAL
Qty: 30 TABLET | Refills: 0 | Status: SHIPPED | OUTPATIENT
Start: 2025-02-07

## 2025-03-19 ENCOUNTER — HOSPITAL ENCOUNTER (OUTPATIENT)
Dept: RADIOLOGY | Facility: HOSPITAL | Age: 65
Discharge: HOME | End: 2025-03-19
Attending: NURSE PRACTITIONER
Payer: MEDICARE

## 2025-03-19 ENCOUNTER — RESULTS FOLLOW-UP (OUTPATIENT)
Dept: INTERNAL MEDICINE | Facility: CLINIC | Age: 65
End: 2025-03-19

## 2025-03-19 ENCOUNTER — OFFICE VISIT (OUTPATIENT)
Dept: INTERNAL MEDICINE | Facility: CLINIC | Age: 65
End: 2025-03-19
Payer: MEDICARE

## 2025-03-19 VITALS
SYSTOLIC BLOOD PRESSURE: 128 MMHG | TEMPERATURE: 97 F | RESPIRATION RATE: 16 BRPM | HEIGHT: 73 IN | OXYGEN SATURATION: 98 % | BODY MASS INDEX: 26.94 KG/M2 | HEART RATE: 87 BPM | DIASTOLIC BLOOD PRESSURE: 84 MMHG | WEIGHT: 203.25 LBS

## 2025-03-19 DIAGNOSIS — F10.29 ALCOHOL DEPENDENCE WITH UNSPECIFIED ALCOHOL-INDUCED DISORDER: ICD-10-CM

## 2025-03-19 DIAGNOSIS — I10 ESSENTIAL HYPERTENSION: ICD-10-CM

## 2025-03-19 DIAGNOSIS — R05.9 COUGH, UNSPECIFIED TYPE: ICD-10-CM

## 2025-03-19 DIAGNOSIS — J06.9 UPPER RESPIRATORY TRACT INFECTION, UNSPECIFIED TYPE: Primary | ICD-10-CM

## 2025-03-19 PROCEDURE — 1160F RVW MEDS BY RX/DR IN RCRD: CPT | Mod: CPTII,S$GLB,, | Performed by: NURSE PRACTITIONER

## 2025-03-19 PROCEDURE — 3074F SYST BP LT 130 MM HG: CPT | Mod: CPTII,S$GLB,, | Performed by: NURSE PRACTITIONER

## 2025-03-19 PROCEDURE — 3008F BODY MASS INDEX DOCD: CPT | Mod: CPTII,S$GLB,, | Performed by: NURSE PRACTITIONER

## 2025-03-19 PROCEDURE — 4010F ACE/ARB THERAPY RXD/TAKEN: CPT | Mod: CPTII,S$GLB,, | Performed by: NURSE PRACTITIONER

## 2025-03-19 PROCEDURE — 71046 X-RAY EXAM CHEST 2 VIEWS: CPT | Mod: 26,,, | Performed by: RADIOLOGY

## 2025-03-19 PROCEDURE — 71046 X-RAY EXAM CHEST 2 VIEWS: CPT | Mod: TC,PO

## 2025-03-19 PROCEDURE — 1159F MED LIST DOCD IN RCRD: CPT | Mod: CPTII,S$GLB,, | Performed by: NURSE PRACTITIONER

## 2025-03-19 PROCEDURE — 3079F DIAST BP 80-89 MM HG: CPT | Mod: CPTII,S$GLB,, | Performed by: NURSE PRACTITIONER

## 2025-03-19 PROCEDURE — 99999 PR PBB SHADOW E&M-EST. PATIENT-LVL IV: CPT | Mod: PBBFAC,,, | Performed by: NURSE PRACTITIONER

## 2025-03-19 PROCEDURE — 99214 OFFICE O/P EST MOD 30 MIN: CPT | Mod: S$GLB,,, | Performed by: NURSE PRACTITIONER

## 2025-03-19 RX ORDER — AMOXICILLIN AND CLAVULANATE POTASSIUM 875; 125 MG/1; MG/1
1 TABLET, FILM COATED ORAL EVERY 12 HOURS
Qty: 14 TABLET | Refills: 0 | Status: SHIPPED | OUTPATIENT
Start: 2025-03-19 | End: 2025-03-26

## 2025-03-19 RX ORDER — FLUTICASONE PROPIONATE 50 MCG
2 SPRAY, SUSPENSION (ML) NASAL DAILY
Qty: 16 G | Refills: 0 | Status: SHIPPED | OUTPATIENT
Start: 2025-03-19

## 2025-03-19 RX ORDER — LEVOCETIRIZINE DIHYDROCHLORIDE 5 MG/1
5 TABLET, FILM COATED ORAL NIGHTLY PRN
Qty: 30 TABLET | Refills: 5 | Status: SHIPPED | OUTPATIENT
Start: 2025-03-19

## 2025-03-19 NOTE — PROGRESS NOTES
Subjective:       Patient ID: Aguilar Rowe is a 65 y.o. male.    Chief Complaint: Cough (X 2 weeks)      History of Present Illness    CHIEF COMPLAINT:  - Mr. Rowe presents with persistent cough with mucus production and shortness of breath for the past 2-3 weeks.    HPI:  Mr. Rowe reports a cough with mucus production for approximately 2-3 weeks. The cough is worse in the morning and is accompanied by shortness of breath and wheezing, particularly at nighttime. He has mild nasal congestion and occasional headaches. He expectorates clear mucus, which appears to be related to coughing episodes rather than nausea. He has pain in the right side of his chest, which worsens when lying on that side. This pain has been present for the same duration as the cough, about 2-3 weeks. He has tried taking ibuprofen, which has provided some relief for the pain. He also reports some discomfort in the right ear, though it is unclear if this is related to ear pain or rib pain.  He denies dizziness, lightheadedness, problems with urination or defecation, nausea, diarrhea, chest pain, palpitations, sore throat, or abdominal pain except with palpation in a specific area.      Problem List[1]      Past Surgical History:   Procedure Laterality Date    APPENDECTOMY      CARPAL TUNNEL RELEASE Right 9/9/2021    Procedure: RELEASE, CARPAL TUNNEL;  Surgeon: Nilton Dubose MD;  Location: [facility] OR;  Service: Orthopedics;  Laterality: Right;  right carpal tunnel release and right long and ring trigger finger release    CARPECTOMY Right 11/14/2022    Procedure: CARPECTOMY;  Surgeon: Nilton Dubose MD;  Location: [facility] OR;  Service: Orthopedics;  Laterality: Right;  proximal row carpectomy    COLONOSCOPY N/A 3/9/2023    Procedure: COLONOSCOPY;  Surgeon: Hailey Johnson MD;  Location: [facility] ENDO;  Service: Endoscopy;  Laterality: N/A;    FINGER SURGERY Left     SHOULDER SURGERY Right     TRIGGER FINGER RELEASE Right 9/9/2021     "Procedure: RELEASE, TRIGGER FINGER;  Surgeon: Nilton Dubose MD;  Location: [facility];  Service: Orthopedics;  Laterality: Right;  right carpal tunnel release and right long and ring trigger finger release       Current Medications[2]    Review of Systems   Constitutional:  Positive for fatigue. Negative for chills and fever.   HENT:  Positive for congestion. Negative for ear pain, postnasal drip, rhinorrhea, sinus pressure, sinus pain, sneezing, sore throat, trouble swallowing and voice change.    Respiratory:  Positive for cough, shortness of breath and wheezing.    Cardiovascular:  Positive for chest pain (right chest wall). Negative for palpitations.   Gastrointestinal:  Negative for abdominal pain, constipation, diarrhea, nausea and vomiting.   Genitourinary:  Negative for dysuria, frequency and urgency.   Neurological:  Positive for headaches. Negative for dizziness and light-headedness.       Objective:   /84 (BP Location: Left arm, Patient Position: Sitting)   Pulse 87   Temp 97.1 °F (36.2 °C) (Oral)   Resp 16   Ht 6' 1" (1.854 m)   Wt 92.2 kg (203 lb 4.2 oz)   SpO2 98%   BMI 26.82 kg/m²      Physical Exam  Constitutional:       General: He is not in acute distress.     Appearance: Normal appearance. He is not ill-appearing.   HENT:      Head: Normocephalic.      Right Ear: Tympanic membrane normal.      Left Ear: Tympanic membrane normal.      Nose: Congestion and rhinorrhea present.      Mouth/Throat:      Pharynx: No oropharyngeal exudate or posterior oropharyngeal erythema.   Cardiovascular:      Rate and Rhythm: Normal rate and regular rhythm.      Pulses: Normal pulses.      Heart sounds: Normal heart sounds. No murmur heard.     No friction rub. No gallop.   Pulmonary:      Effort: Pulmonary effort is normal. No respiratory distress.      Breath sounds: Normal breath sounds. No wheezing or rales.   Chest:      Chest wall: Tenderness present.   Abdominal:      Palpations: Abdomen is " soft.      Tenderness: There is no abdominal tenderness. There is no guarding.   Skin:     General: Skin is warm and dry.      Coloration: Skin is not pale.      Findings: No erythema.   Neurological:      Mental Status: He is alert and oriented to person, place, and time.   Psychiatric:         Mood and Affect: Mood normal.         Behavior: Behavior normal.         Assessment & Plan     Problem List Items Addressed This Visit          Psychiatric    Alcohol dependence    Current Assessment & Plan   ETOH cessation for liver health.             Cardiac/Vascular    Essential hypertension    Current Assessment & Plan   Blood pressure stable. Continue current medications.          Other Visit Diagnoses         Upper respiratory tract infection, unspecified type    -  Primary    Relevant Medications    amoxicillin-clavulanate 875-125mg (AUGMENTIN) 875-125 mg per tablet    fluticasone propionate (FLONASE) 50 mcg/actuation nasal spray    levocetirizine (XYZAL) 5 MG tablet      Cough, unspecified type        Relevant Orders    X-Ray Chest PA And Lateral (Completed)        Tylenol/ibuprofen for pain and fever.   Hand hygiene.   Maintain adequate hydration.   Antihistamine and flonase for nasal congestion and upper respiratory symptoms.   Rest.       Assessment & Plan    MEDICAL DECISION MAKING:  - Assessed symptoms of persistent cough with mucus production, shortness of breath, wheezing, and right-sided chest pain.  - Considered differential diagnoses including pneumonia and bronchitis.  - Decided against COVID-19 or flu testing due to duration of symptoms and lack of impact on treatment plan.  - Evaluated liver function based on history and recent tests, determining no immediate follow-up needed.    MEDICATIONS:  - Started antibiotic for presumptive bronchitis treatment.  - Started Zyrtec for allergy symptoms, or will prescribe if patient does not have it.  - Started Flonase for nasal congestion.    ORDERS:  - Ordered XR  "Chest to rule out pneumonia.    FOLLOW UP:  - Contact the office if XR Chest reveals pneumonia, as treatment plan will change to 2 medications instead of 1.  - Follow up with liver specialist in approximately 1 year from last visit for routine monitoring of liver cysts and fatty liver.                  Portions of this note may have been created with voice recognition software. Occasional "wrong-word" or "sound-a-like" substitutions may have occurred due to the inherent limitations of voice recognition software. Please, read the note carefully and recognize, using context, where substitutions have occurred.       This note was generated with the assistance of ambient listening technology. Verbal consent was obtained by the patient and accompanying visitor(s) for the recording of patient appointment to facilitate this note. I attest to having reviewed and edited the generated note for accuracy, though some syntax or spelling errors may persist. Please contact the author of this note for any clarification.            [1]   Patient Active Problem List  Diagnosis    Anxiety    Essential hypertension    Alcohol dependence    Right carpal tunnel syndrome    Pain of right hand    Hiatal hernia    Decreased range of motion    Decreased muscle strength    Abnormal nuclear stress test    Closed fracture of orbit    Hypercholesterolemia    Skin lesion of scalp    Dark stools    Hepatic cyst    Fatty liver   [2]   Current Outpatient Medications:     acetaminophen (TYLENOL 8 HOUR ORAL), Take by mouth every 8 (eight) hours as needed., Disp: , Rfl:     aspirin (ECOTRIN) 81 MG EC tablet, Take 81 mg by mouth once daily., Disp: , Rfl:     atorvastatin (LIPITOR) 20 MG tablet, Take 1 tablet (20 mg total) by mouth once daily., Disp: 90 tablet, Rfl: 3    cetirizine (ZYRTEC) 10 MG tablet, Take 1 tablet (10 mg total) by mouth once daily., Disp: 30 tablet, Rfl: 0    FLUoxetine 40 MG capsule, TAKE 1 CAPSULE(40 MG) BY MOUTH EVERY DAY., Disp: " 90 capsule, Rfl: 1    hydrOXYzine pamoate (VISTARIL) 25 MG Cap, TAKE 1 CAPSULE(25 MG) BY MOUTH EVERY 6 HOURS AS NEEDED FOR ANXIETY, Disp: 30 capsule, Rfl: 5    lisinopriL 10 MG tablet, Take 1 tablet (10 mg total) by mouth once daily., Disp: 90 tablet, Rfl: 3    traZODone (DESYREL) 50 MG tablet, TAKE 1 TABLET(50 MG) BY MOUTH EVERY EVENING, Disp: 90 tablet, Rfl: 3    amoxicillin-clavulanate 875-125mg (AUGMENTIN) 875-125 mg per tablet, Take 1 tablet by mouth every 12 (twelve) hours. for 7 days, Disp: 14 tablet, Rfl: 0    fluticasone propionate (FLONASE) 50 mcg/actuation nasal spray, 2 sprays (100 mcg total) by Each Nostril route once daily., Disp: 16 g, Rfl: 0    levocetirizine (XYZAL) 5 MG tablet, Take 1 tablet (5 mg total) by mouth nightly as needed for Allergies., Disp: 30 tablet, Rfl: 5

## 2025-04-30 NOTE — TELEPHONE ENCOUNTER
----- Message from Dipesh Lucero sent at 6/18/2024 11:51 AM CDT -----  Contact: Aguilar Sheikh called in regards to he had an ultrasound did on 6/17/24 and was tols that he would be called with the results and no one has called. Call Back is [phone]  
----- Message from Rocio Newman NP sent at 6/18/2024 10:06 AM CDT -----  Fatty liver and liver cyst noted. Recommend follow up with hepatology. Will place order for hepatology. Also recommend trying mediterranean diet and reducing alcohol intake. Improve diet by decreasing saturated fats, increasing vegetable intake, and choosing leaner cuts and preparations of meats. Incorporate exercise into daily routine.     
----- Message from Sumit Hale sent at 6/18/2024  5:16 PM CDT -----  Type:  Patient Returning Call    Who Called:CHARLES PRATER [[id number]]  Who Left Message for Patient:YAHIR Segovia  Does the patient know what this is regarding?:results of ultrasound done on 6/17  Would the patient rather a call back or a response via [facility]? call  Best Call Back Number:[phone]  Additional Information: Patient returning call.  Please call patient back as soon as possible to advise and further assist.  
Spoke with patient, aware of result and express understanding  
Tried to call patient no answer, no voice mail set up. Will try again   
16

## 2025-07-07 RX ORDER — FLUOXETINE HYDROCHLORIDE 40 MG/1
40 CAPSULE ORAL
Qty: 90 CAPSULE | Refills: 1 | Status: SHIPPED | OUTPATIENT
Start: 2025-07-07 | End: 2025-07-09 | Stop reason: SDUPTHER

## 2025-07-07 NOTE — TELEPHONE ENCOUNTER
Refill Routing Note   Medication(s) are not appropriate for processing by [facility] for the following reason(s):        Non-participating provider    [facility] action(s):  Route               Appointments  past 12m or future 3m with PCP    Date Provider   Last Visit   3/19/2025 Rocio Newman NP   Next Visit   Visit date not found Rocio Newman NP   ED visits in past 90 days: 0        Note composed:12:20 PM 07/07/2025

## 2025-07-09 ENCOUNTER — HOSPITAL ENCOUNTER (OUTPATIENT)
Dept: RADIOLOGY | Facility: HOSPITAL | Age: 65
Discharge: HOME | End: 2025-07-09
Attending: NURSE PRACTITIONER
Payer: MEDICARE

## 2025-07-09 ENCOUNTER — OFFICE VISIT (OUTPATIENT)
Dept: INTERNAL MEDICINE | Facility: CLINIC | Age: 65
End: 2025-07-09
Payer: MEDICARE

## 2025-07-09 VITALS
DIASTOLIC BLOOD PRESSURE: 68 MMHG | HEIGHT: 73 IN | OXYGEN SATURATION: 97 % | RESPIRATION RATE: 16 BRPM | BODY MASS INDEX: 27.43 KG/M2 | SYSTOLIC BLOOD PRESSURE: 116 MMHG | TEMPERATURE: 98 F | HEART RATE: 97 BPM | WEIGHT: 207 LBS

## 2025-07-09 DIAGNOSIS — M54.12 LEFT CERVICAL RADICULOPATHY: ICD-10-CM

## 2025-07-09 DIAGNOSIS — R59.0 LYMPHADENOPATHY, SUPRACLAVICULAR: ICD-10-CM

## 2025-07-09 DIAGNOSIS — R59.0 LOCALIZED ENLARGED LYMPH NODES: ICD-10-CM

## 2025-07-09 DIAGNOSIS — F41.9 ANXIETY: ICD-10-CM

## 2025-07-09 DIAGNOSIS — E78.00 HYPERCHOLESTEROLEMIA: ICD-10-CM

## 2025-07-09 DIAGNOSIS — F33.0 MILD EPISODE OF RECURRENT MAJOR DEPRESSIVE DISORDER: ICD-10-CM

## 2025-07-09 DIAGNOSIS — R73.9 HYPERGLYCEMIA: ICD-10-CM

## 2025-07-09 DIAGNOSIS — F10.29 ALCOHOL DEPENDENCE WITH UNSPECIFIED ALCOHOL-INDUCED DISORDER: ICD-10-CM

## 2025-07-09 DIAGNOSIS — I10 ESSENTIAL HYPERTENSION: Primary | ICD-10-CM

## 2025-07-09 PROBLEM — J43.9 PULMONARY EMPHYSEMA: Status: ACTIVE | Noted: 2025-07-09

## 2025-07-09 PROBLEM — I70.0 AORTIC ATHEROSCLEROSIS: Status: ACTIVE | Noted: 2025-07-09

## 2025-07-09 PROCEDURE — G2211 COMPLEX E/M VISIT ADD ON: HCPCS | Mod: S$GLB,,, | Performed by: NURSE PRACTITIONER

## 2025-07-09 PROCEDURE — 99214 OFFICE O/P EST MOD 30 MIN: CPT | Mod: S$GLB,,, | Performed by: NURSE PRACTITIONER

## 2025-07-09 PROCEDURE — 99999 PR PBB SHADOW E&M-EST. PATIENT-LVL IV: CPT | Mod: PBBFAC,,, | Performed by: NURSE PRACTITIONER

## 2025-07-09 PROCEDURE — 3008F BODY MASS INDEX DOCD: CPT | Mod: CPTII,S$GLB,, | Performed by: NURSE PRACTITIONER

## 2025-07-09 PROCEDURE — 3078F DIAST BP <80 MM HG: CPT | Mod: CPTII,S$GLB,, | Performed by: NURSE PRACTITIONER

## 2025-07-09 PROCEDURE — 3074F SYST BP LT 130 MM HG: CPT | Mod: CPTII,S$GLB,, | Performed by: NURSE PRACTITIONER

## 2025-07-09 PROCEDURE — 71260 CT THORAX DX C+: CPT | Mod: 26,,, | Performed by: RADIOLOGY

## 2025-07-09 PROCEDURE — 1160F RVW MEDS BY RX/DR IN RCRD: CPT | Mod: CPTII,S$GLB,, | Performed by: NURSE PRACTITIONER

## 2025-07-09 PROCEDURE — 4010F ACE/ARB THERAPY RXD/TAKEN: CPT | Mod: CPTII,S$GLB,, | Performed by: NURSE PRACTITIONER

## 2025-07-09 PROCEDURE — 3044F HG A1C LEVEL LT 7.0%: CPT | Mod: CPTII,S$GLB,, | Performed by: NURSE PRACTITIONER

## 2025-07-09 PROCEDURE — 71260 CT THORAX DX C+: CPT | Mod: TC,PO

## 2025-07-09 PROCEDURE — 1159F MED LIST DOCD IN RCRD: CPT | Mod: CPTII,S$GLB,, | Performed by: NURSE PRACTITIONER

## 2025-07-09 PROCEDURE — 25500020 PHARM REV CODE 255: Mod: PO | Performed by: NURSE PRACTITIONER

## 2025-07-09 RX ORDER — LISINOPRIL 10 MG/1
10 TABLET ORAL DAILY
Qty: 90 TABLET | Refills: 3 | Status: SHIPPED | OUTPATIENT
Start: 2025-07-09

## 2025-07-09 RX ORDER — ATORVASTATIN CALCIUM 20 MG/1
20 TABLET, FILM COATED ORAL DAILY
Qty: 90 TABLET | Refills: 3 | Status: SHIPPED | OUTPATIENT
Start: 2025-07-09

## 2025-07-09 RX ORDER — METHOCARBAMOL 500 MG/1
500 TABLET, FILM COATED ORAL 3 TIMES DAILY PRN
Qty: 30 TABLET | Refills: 0 | Status: SHIPPED | OUTPATIENT
Start: 2025-07-09

## 2025-07-09 RX ORDER — IBUPROFEN 800 MG/1
800 TABLET, FILM COATED ORAL 3 TIMES DAILY PRN
Qty: 30 TABLET | Refills: 0 | Status: SHIPPED | OUTPATIENT
Start: 2025-07-09

## 2025-07-09 RX ORDER — FLUOXETINE HYDROCHLORIDE 40 MG/1
40 CAPSULE ORAL DAILY
Qty: 90 CAPSULE | Refills: 1 | Status: SHIPPED | OUTPATIENT
Start: 2025-07-09

## 2025-07-09 RX ORDER — HYDROXYZINE PAMOATE 25 MG/1
CAPSULE ORAL
Qty: 30 CAPSULE | Refills: 5 | Status: SHIPPED | OUTPATIENT
Start: 2025-07-09

## 2025-07-09 RX ORDER — TRAZODONE HYDROCHLORIDE 50 MG/1
50 TABLET ORAL NIGHTLY
Qty: 90 TABLET | Refills: 3 | Status: SHIPPED | OUTPATIENT
Start: 2025-07-09

## 2025-07-09 RX ADMIN — IOHEXOL 75 ML: 350 INJECTION, SOLUTION INTRAVENOUS at 10:07

## 2025-07-15 NOTE — PROGRESS NOTES
Subjective:       Patient ID: Aguilar Rowe is a 65 y.o. male.    Chief Complaint: Medication Refill (Numbness to left side of neck)      History of Present Illness    CHIEF COMPLAINT:  - Mr. Rowe presents for medication refills and to discuss numbness and swelling in the neck and shoulder area.    HPI:  Mr. Rowe reports numbness and pain in the neck and shoulder area for approximately 1 year.   He and his family member also noticed some swelling to L side chest/neck.   Mr. Rowe also reports mild depression and feeling overwhelmed. He is currently in [place], staying near doctors as his wife recently had open heart surgery. She was discharged from the hospital on July 4th, and 3 weeks prior to that, she had a bypass surgery.   Mr. Rowe mentions having to take care of dogs and chickens, as well as performing household chores like cooking, sweeping, and doing laundry.      SOCIAL HISTORY:  - Alcohol: Drinks a few in the evenings      Problem List[1]      Past Surgical History:   Procedure Laterality Date    APPENDECTOMY      CARPAL TUNNEL RELEASE Right 9/9/2021    Procedure: RELEASE, CARPAL TUNNEL;  Surgeon: Nilton Dubose MD;  Location: [facility] OR;  Service: Orthopedics;  Laterality: Right;  right carpal tunnel release and right long and ring trigger finger release    CARPECTOMY Right 11/14/2022    Procedure: CARPECTOMY;  Surgeon: Nilton Dubose MD;  Location: [facility] OR;  Service: Orthopedics;  Laterality: Right;  proximal row carpectomy    COLONOSCOPY N/A 3/9/2023    Procedure: COLONOSCOPY;  Surgeon: Hailey Johnson MD;  Location: [facility] ENDO;  Service: Endoscopy;  Laterality: N/A;    FINGER SURGERY Left     SHOULDER SURGERY Right     TRIGGER FINGER RELEASE Right 9/9/2021    Procedure: RELEASE, TRIGGER FINGER;  Surgeon: Nilton Dubose MD;  Location: [facility] OR;  Service: Orthopedics;  Laterality: Right;  right carpal tunnel release and right long and ring trigger finger release  "      Current Medications[2]    Review of Systems   Constitutional:  Negative for appetite change, chills, diaphoresis, fatigue and fever.   Respiratory:  Negative for cough and shortness of breath.    Cardiovascular:  Negative for chest pain and palpitations.   Gastrointestinal:  Negative for abdominal pain, constipation, diarrhea, nausea and vomiting.   Genitourinary:  Negative for dysuria and frequency.   Musculoskeletal:  Positive for arthralgias and neck pain. Negative for gait problem.   Neurological:  Positive for numbness. Negative for dizziness, syncope, speech difficulty, light-headedness and headaches.   Psychiatric/Behavioral:  Positive for dysphoric mood. The patient is nervous/anxious.        Objective:   /68 (BP Location: Left arm, Patient Position: Sitting)   Pulse 97   Temp 98.3 °F (36.8 °C) (Oral)   Resp 16   Ht 6' 1" (1.854 m)   Wt 93.9 kg (207 lb 0.2 oz)   SpO2 97%   BMI 27.31 kg/m²      Physical Exam  Vitals reviewed.   Constitutional:       General: He is not in acute distress.     Appearance: Normal appearance. He is well-developed. He is not ill-appearing or diaphoretic.   HENT:      Head: Normocephalic.   Neck:      Comments: Supraclavicular swelling on L side.   Cardiovascular:      Rate and Rhythm: Normal rate and regular rhythm.      Pulses: Normal pulses.      Heart sounds: Normal heart sounds. No murmur heard.     No friction rub. No gallop.   Pulmonary:      Effort: Pulmonary effort is normal. No respiratory distress.      Breath sounds: Normal breath sounds. No rales.   Skin:     General: Skin is warm and dry.      Coloration: Skin is not pale.      Findings: No erythema.   Neurological:      Mental Status: He is alert and oriented to person, place, and time.   Psychiatric:         Mood and Affect: Mood normal.         Behavior: Behavior normal.         Assessment & Plan     Problem List Items Addressed This Visit          Psychiatric    Anxiety    Current Assessment & " Plan   On prozac. Refill sent to pharmacy.          Relevant Medications    hydrOXYzine pamoate (VISTARIL) 25 MG Cap    FLUoxetine 40 MG capsule    Alcohol dependence    Current Assessment & Plan   ETOH cessation.             Cardiac/Vascular    Essential hypertension - Primary    Current Assessment & Plan   Blood pressure stable. Continue current medications.         Relevant Medications    lisinopriL 10 MG tablet    Other Relevant Orders    Comprehensive Metabolic Panel (Completed)    CBC Auto Differential (Completed)    Hypercholesterolemia    Current Assessment & Plan   Lipid panel ordered. On statin.          Relevant Medications    atorvastatin (LIPITOR) 20 MG tablet    Other Relevant Orders    Lipid Panel (Completed)     Other Visit Diagnoses         Mild episode of recurrent major depressive disorder        Relevant Medications    traZODone (DESYREL) 50 MG tablet      Hyperglycemia        Relevant Orders    Hemoglobin A1C (Completed)      Localized enlarged lymph nodes        Relevant Orders    CT Chest With Contrast (Completed)      Lymphadenopathy, supraclavicular        Relevant Orders    CT Chest With Contrast (Completed)    CBC Auto Differential (Completed)      Left cervical radiculopathy        Relevant Medications    methocarbamoL (ROBAXIN) 500 MG Tab    ibuprofen (ADVIL,MOTRIN) 800 MG tablet            Assessment & Plan    MEDICAL DECISION MAKING:  - Assessed reported numbness and swelling in neck area.  - Considered possibility of cervical arthritis or slipped disc based on previous cervical spine XR showing arthritic changes.  - Explained that numbness and tingling likely originating from neck, possibly due to cervical arthritis or slipped disc.    MEDICATIONS:  - Started anti-inflammatory medication and muscle relaxer with instructions to use at home before sleep or when no activities are planned. Discussed potential drowsiness from muscle relaxer. Mr. Rowe to avoid alcohol and driving when  "taking prescribed muscle relaxer.    ORDERS:  - Ordered CT Chest to evaluate swelling and potentially visualize cervical spine.  - Ordered CBC, CMP.         Follow up in about 6 months (around 1/9/2026), or if symptoms worsen or fail to improve.    Visit today included increased complexity associated with the care of the episodic problem  addressed and managing the longitudinal care of the patient due to the serious and/or complex managed problem(s) .        Portions of this note may have been created with voice recognition software. Occasional "wrong-word" or "sound-a-like" substitutions may have occurred due to the inherent limitations of voice recognition software. Please, read the note carefully and recognize, using context, where substitutions have occurred.       This note was generated with the assistance of ambient listening technology. Verbal consent was obtained by the patient and accompanying visitor(s) for the recording of patient appointment to facilitate this note. I attest to having reviewed and edited the generated note for accuracy, though some syntax or spelling errors may persist. Please contact the author of this note for any clarification.              [1]   Patient Active Problem List  Diagnosis    Anxiety    Essential hypertension    Alcohol dependence    Right carpal tunnel syndrome    Pain of right hand    Hiatal hernia    Decreased range of motion    Decreased muscle strength    Abnormal nuclear stress test    Closed fracture of orbit    Hypercholesterolemia    Skin lesion of scalp    Dark stools    Hepatic cyst    Fatty liver    Pulmonary emphysema    Aortic atherosclerosis   [2]   Current Outpatient Medications:     acetaminophen (TYLENOL 8 HOUR ORAL), Take by mouth every 8 (eight) hours as needed., Disp: , Rfl:     aspirin (ECOTRIN) 81 MG EC tablet, Take 81 mg by mouth once daily., Disp: , Rfl:     cetirizine (ZYRTEC) 10 MG tablet, Take 1 tablet (10 mg total) by mouth once daily., Disp: " 30 tablet, Rfl: 0    fluticasone propionate (FLONASE) 50 mcg/actuation nasal spray, 2 sprays (100 mcg total) by Each Nostril route once daily., Disp: 16 g, Rfl: 0    levocetirizine (XYZAL) 5 MG tablet, Take 1 tablet (5 mg total) by mouth nightly as needed for Allergies., Disp: 30 tablet, Rfl: 5    atorvastatin (LIPITOR) 20 MG tablet, Take 1 tablet (20 mg total) by mouth once daily., Disp: 90 tablet, Rfl: 3    FLUoxetine 40 MG capsule, Take 1 capsule (40 mg total) by mouth once daily., Disp: 90 capsule, Rfl: 1    hydrOXYzine pamoate (VISTARIL) 25 MG Cap, TAKE 1 CAPSULE(25 MG) BY MOUTH EVERY 6 HOURS AS NEEDED FOR ANXIETY, Disp: 30 capsule, Rfl: 5    ibuprofen (ADVIL,MOTRIN) 800 MG tablet, Take 1 tablet (800 mg total) by mouth 3 (three) times daily as needed., Disp: 30 tablet, Rfl: 0    lisinopriL 10 MG tablet, Take 1 tablet (10 mg total) by mouth once daily., Disp: 90 tablet, Rfl: 3    methocarbamoL (ROBAXIN) 500 MG Tab, Take 1 tablet (500 mg total) by mouth 3 (three) times daily as needed (muscle pain. may cause drowsiness)., Disp: 30 tablet, Rfl: 0    traZODone (DESYREL) 50 MG tablet, Take 1 tablet (50 mg total) by mouth every evening., Disp: 90 tablet, Rfl: 3

## 2025-07-21 DIAGNOSIS — J06.9 UPPER RESPIRATORY TRACT INFECTION, UNSPECIFIED TYPE: ICD-10-CM

## 2025-07-21 RX ORDER — CETIRIZINE HYDROCHLORIDE 10 MG/1
10 TABLET ORAL
Qty: 30 TABLET | Refills: 0 | Status: SHIPPED | OUTPATIENT
Start: 2025-07-21

## 2025-08-29 DIAGNOSIS — M54.12 LEFT CERVICAL RADICULOPATHY: ICD-10-CM

## 2025-08-29 DIAGNOSIS — J06.9 UPPER RESPIRATORY TRACT INFECTION, UNSPECIFIED TYPE: ICD-10-CM

## 2025-09-02 RX ORDER — METHOCARBAMOL 500 MG/1
TABLET, FILM COATED ORAL
Qty: 30 TABLET | Refills: 0 | Status: SHIPPED | OUTPATIENT
Start: 2025-09-02

## 2025-09-02 RX ORDER — CETIRIZINE HYDROCHLORIDE 10 MG/1
10 TABLET ORAL
Qty: 30 TABLET | Refills: 0 | Status: SHIPPED | OUTPATIENT
Start: 2025-09-02

## (undated) DEVICE — SUPPORT ULNA NERVE PROTECTOR

## (undated) DEVICE — GAUZE SPONGE 4X4 12PLY

## (undated) DEVICE — BANDAGE ESMARK ELASTIC ST 4X9

## (undated) DEVICE — GLOVE BIOGEL SZ 8 1/2

## (undated) DEVICE — SEE MEDLINE ITEM 157173

## (undated) DEVICE — SPLINT OCL 3

## (undated) DEVICE — SYR 10CC LUER LOCK

## (undated) DEVICE — GLOVE SURGICAL LATEX SZ 7

## (undated) DEVICE — DRAPE HAND STERILE

## (undated) DEVICE — ALCOHOL 70% ISOP RUBBING 4OZ

## (undated) DEVICE — SUT 4-0 ETHILON 18 PS-2

## (undated) DEVICE — DRAPE PLASTIC U 60X72

## (undated) DEVICE — DRESSING XEROFORM FOIL PK 1X8

## (undated) DEVICE — GOWN POLY REINF BRTH SLV XL

## (undated) DEVICE — COVER CAMERA OPERATING ROOM

## (undated) DEVICE — SCRUB HIBICLENS 4% CHG 4OZ

## (undated) DEVICE — SOL NACL IRR 1000ML BTL

## (undated) DEVICE — APPLICATOR CHLORAPREP ORN 26ML

## (undated) DEVICE — PAD ABD 8X10 STERILE

## (undated) DEVICE — SUT CTD VICRYL 4-0 P-3 18IN

## (undated) DEVICE — PAD CAST SPECIALIST STRL 4

## (undated) DEVICE — DRAPE THREE-QTR REINF 53X77IN

## (undated) DEVICE — SEE MEDLINE ITEM 152622

## (undated) DEVICE — SUT VICRYL 2-0 CT-2 VCP269H

## (undated) DEVICE — UNDERGLOVES BIOGEL PI SIZE 7.5

## (undated) DEVICE — SEE MEDLINE ITEM 157131

## (undated) DEVICE — NDL SAFETY 25G X 1.5 ECLIPSE

## (undated) DEVICE — POSITIONER HEAD DONUT 9IN FOAM

## (undated) DEVICE — GOWN NONREINF SET-IN SLV 2XL

## (undated) DEVICE — SEE MEDLINE ITEM 157027

## (undated) DEVICE — SEE MEDLINE ITEM 152522

## (undated) DEVICE — BANDAGE MATRIX HK LOOP 3IN 5YD

## (undated) DEVICE — SUT VICRYL PLUS 3-0 PS2 18

## (undated) DEVICE — TOURNIQUET SB QC DP 18X4IN

## (undated) DEVICE — SUT 3-0 VICRYL PS-2

## (undated) DEVICE — UNDERGLOVES BIOGEL PI SIZE 8.5

## (undated) DEVICE — PACK BASIC SETUP SC BR

## (undated) DEVICE — BANDAGE CONFORM 3IN STRL

## (undated) DEVICE — TOWEL OR DISP STRL BLUE 4/PK

## (undated) DEVICE — SOL 9P NACL IRR PIC IL

## (undated) DEVICE — COVER LIGHT HANDLE 80/CA

## (undated) DEVICE — DRAPE MOBILE C-ARM

## (undated) DEVICE — BANDAGE ELASTIC 3X5 VELCRO ST

## (undated) DEVICE — GOWN SURG 2XL DISP TIE BACK

## (undated) DEVICE — SEE MEDLINE ITEM 157117

## (undated) DEVICE — PAD CAST SPECIALIST STRL 3